# Patient Record
Sex: FEMALE | Race: WHITE | Employment: OTHER | URBAN - METROPOLITAN AREA
[De-identification: names, ages, dates, MRNs, and addresses within clinical notes are randomized per-mention and may not be internally consistent; named-entity substitution may affect disease eponyms.]

---

## 2017-01-13 ENCOUNTER — OFFICE VISIT (OUTPATIENT)
Dept: FAMILY MEDICINE CLINIC | Age: 62
End: 2017-01-13

## 2017-01-13 VITALS
DIASTOLIC BLOOD PRESSURE: 60 MMHG | BODY MASS INDEX: 18.78 KG/M2 | SYSTOLIC BLOOD PRESSURE: 118 MMHG | HEART RATE: 82 BPM | WEIGHT: 109.4 LBS | OXYGEN SATURATION: 99 % | TEMPERATURE: 97.9 F

## 2017-01-13 DIAGNOSIS — J06.9 VIRAL UPPER RESPIRATORY TRACT INFECTION: Primary | ICD-10-CM

## 2017-01-13 DIAGNOSIS — F41.9 ANXIETY: ICD-10-CM

## 2017-01-13 DIAGNOSIS — J01.01 ACUTE RECURRENT MAXILLARY SINUSITIS: ICD-10-CM

## 2017-01-13 DIAGNOSIS — M81.0 OSTEOPOROSIS: ICD-10-CM

## 2017-01-13 PROCEDURE — 99214 OFFICE O/P EST MOD 30 MIN: CPT | Performed by: FAMILY MEDICINE

## 2017-01-13 RX ORDER — ALENDRONATE SODIUM 70 MG/1
70 TABLET ORAL
Qty: 12 TABLET | Refills: 0 | Status: SHIPPED | OUTPATIENT
Start: 2017-01-13 | End: 2017-12-29 | Stop reason: SDUPTHER

## 2017-01-13 RX ORDER — AMOXICILLIN AND CLAVULANATE POTASSIUM 500; 125 MG/1; MG/1
1 TABLET, FILM COATED ORAL 3 TIMES DAILY
Qty: 21 TABLET | Refills: 0 | Status: SHIPPED | OUTPATIENT
Start: 2017-01-13 | End: 2017-01-20

## 2017-01-13 RX ORDER — TRIAMCINOLONE ACETONIDE 1 MG/G
CREAM TOPICAL
Refills: 0 | COMMUNITY
Start: 2016-12-08 | End: 2019-02-15

## 2017-01-16 RX ORDER — FLUCONAZOLE 150 MG/1
TABLET ORAL
Qty: 1 TABLET | Refills: 0 | Status: SHIPPED | OUTPATIENT
Start: 2017-01-16 | End: 2018-01-17

## 2017-01-16 RX ORDER — AZITHROMYCIN 250 MG/1
TABLET, FILM COATED ORAL
Qty: 1 PACKET | Refills: 0 | OUTPATIENT
Start: 2017-01-16 | End: 2017-01-26

## 2017-05-03 ENCOUNTER — OFFICE VISIT (OUTPATIENT)
Dept: FAMILY MEDICINE CLINIC | Age: 62
End: 2017-05-03

## 2017-05-03 VITALS
WEIGHT: 112.6 LBS | SYSTOLIC BLOOD PRESSURE: 108 MMHG | DIASTOLIC BLOOD PRESSURE: 60 MMHG | OXYGEN SATURATION: 98 % | HEART RATE: 76 BPM | BODY MASS INDEX: 19.33 KG/M2 | TEMPERATURE: 97.6 F

## 2017-05-03 DIAGNOSIS — E78.49 OTHER HYPERLIPIDEMIA: ICD-10-CM

## 2017-05-03 DIAGNOSIS — F41.9 ANXIETY: ICD-10-CM

## 2017-05-03 DIAGNOSIS — F43.20 ADULT SITUATIONAL STRESS DISORDER: ICD-10-CM

## 2017-05-03 DIAGNOSIS — M81.0 OSTEOPOROSIS: Primary | ICD-10-CM

## 2017-05-03 PROCEDURE — 99213 OFFICE O/P EST LOW 20 MIN: CPT | Performed by: FAMILY MEDICINE

## 2017-05-03 RX ORDER — ALENDRONATE SODIUM 70 MG/1
70 TABLET ORAL
Qty: 12 TABLET | Refills: 0 | Status: SHIPPED | OUTPATIENT
Start: 2017-05-03 | End: 2017-07-24 | Stop reason: ALTCHOICE

## 2017-05-03 ASSESSMENT — ENCOUNTER SYMPTOMS
BLOOD IN STOOL: 0
ABDOMINAL PAIN: 0
COUGH: 0
SHORTNESS OF BREATH: 0
CHEST TIGHTNESS: 0

## 2017-06-07 ENCOUNTER — OFFICE VISIT (OUTPATIENT)
Dept: FAMILY MEDICINE CLINIC | Age: 62
End: 2017-06-07

## 2017-06-07 VITALS
SYSTOLIC BLOOD PRESSURE: 110 MMHG | HEART RATE: 77 BPM | WEIGHT: 114.4 LBS | DIASTOLIC BLOOD PRESSURE: 64 MMHG | BODY MASS INDEX: 19.64 KG/M2 | OXYGEN SATURATION: 98 % | TEMPERATURE: 97.6 F

## 2017-06-07 DIAGNOSIS — M81.0 OSTEOPOROSIS: Primary | ICD-10-CM

## 2017-06-07 DIAGNOSIS — F43.20 ADULT SITUATIONAL STRESS DISORDER: ICD-10-CM

## 2017-06-07 PROCEDURE — 99213 OFFICE O/P EST LOW 20 MIN: CPT | Performed by: FAMILY MEDICINE

## 2017-06-07 RX ORDER — RISEDRONATE SODIUM 35 MG/1
TABLET, FILM COATED ORAL
Qty: 4 TABLET | Refills: 2 | Status: SHIPPED | OUTPATIENT
Start: 2017-06-07 | End: 2017-12-13 | Stop reason: SDUPTHER

## 2017-06-07 ASSESSMENT — ENCOUNTER SYMPTOMS
CHEST TIGHTNESS: 0
BLOOD IN STOOL: 0
ABDOMINAL PAIN: 0
COUGH: 0
SHORTNESS OF BREATH: 0

## 2017-07-27 ENCOUNTER — TELEPHONE (OUTPATIENT)
Dept: FAMILY MEDICINE CLINIC | Age: 62
End: 2017-07-27

## 2017-07-31 ENCOUNTER — TELEPHONE (OUTPATIENT)
Dept: FAMILY MEDICINE CLINIC | Age: 62
End: 2017-07-31

## 2017-08-08 ENCOUNTER — OFFICE VISIT (OUTPATIENT)
Dept: FAMILY MEDICINE CLINIC | Age: 62
End: 2017-08-08

## 2017-08-08 VITALS
HEART RATE: 101 BPM | HEIGHT: 64 IN | OXYGEN SATURATION: 99 % | WEIGHT: 114 LBS | RESPIRATION RATE: 14 BRPM | SYSTOLIC BLOOD PRESSURE: 118 MMHG | TEMPERATURE: 98.3 F | DIASTOLIC BLOOD PRESSURE: 74 MMHG | BODY MASS INDEX: 19.46 KG/M2

## 2017-08-08 DIAGNOSIS — Z00.00 ANNUAL PHYSICAL EXAM: Primary | ICD-10-CM

## 2017-08-08 DIAGNOSIS — Z11.4 ENCOUNTER FOR SCREENING FOR HIV: ICD-10-CM

## 2017-08-08 DIAGNOSIS — Z11.59 ENCOUNTER FOR HEPATITIS C SCREENING TEST FOR LOW RISK PATIENT: ICD-10-CM

## 2017-08-08 LAB
BILIRUBIN, POC: NORMAL
BLOOD URINE, POC: NORMAL
CLARITY, POC: CLEAR
COLOR, POC: YELLOW
GLUCOSE URINE, POC: NORMAL
KETONES, POC: NORMAL
LEUKOCYTE EST, POC: NORMAL
NITRITE, POC: NORMAL
PH, POC: 5.5
PROTEIN, POC: NORMAL
SPECIFIC GRAVITY, POC: 1
UROBILINOGEN, POC: NORMAL

## 2017-08-08 PROCEDURE — 93000 ELECTROCARDIOGRAM COMPLETE: CPT | Performed by: CLINICAL NURSE SPECIALIST

## 2017-08-08 PROCEDURE — 99396 PREV VISIT EST AGE 40-64: CPT | Performed by: CLINICAL NURSE SPECIALIST

## 2017-08-08 PROCEDURE — 81002 URINALYSIS NONAUTO W/O SCOPE: CPT | Performed by: CLINICAL NURSE SPECIALIST

## 2017-08-08 ASSESSMENT — ENCOUNTER SYMPTOMS
COLOR CHANGE: 0
VOMITING: 0
SHORTNESS OF BREATH: 0
COUGH: 0
ABDOMINAL PAIN: 0
CONSTIPATION: 0
NAUSEA: 0
CHEST TIGHTNESS: 0
DIARRHEA: 0
WHEEZING: 0

## 2017-08-17 DIAGNOSIS — Z00.00 ANNUAL PHYSICAL EXAM: ICD-10-CM

## 2017-08-17 DIAGNOSIS — Z11.4 ENCOUNTER FOR SCREENING FOR HIV: ICD-10-CM

## 2017-08-17 DIAGNOSIS — Z11.59 ENCOUNTER FOR HEPATITIS C SCREENING TEST FOR LOW RISK PATIENT: ICD-10-CM

## 2017-08-17 LAB
A/G RATIO: 2.1 (ref 1.1–2.2)
ALBUMIN SERPL-MCNC: 4.7 G/DL (ref 3.4–5)
ALP BLD-CCNC: 55 U/L (ref 40–129)
ALT SERPL-CCNC: 40 U/L (ref 10–40)
ANION GAP SERPL CALCULATED.3IONS-SCNC: 11 MMOL/L (ref 3–16)
AST SERPL-CCNC: 24 U/L (ref 15–37)
BASOPHILS ABSOLUTE: 0 K/UL (ref 0–0.2)
BASOPHILS RELATIVE PERCENT: 0.7 %
BILIRUB SERPL-MCNC: 1.1 MG/DL (ref 0–1)
BUN BLDV-MCNC: 18 MG/DL (ref 7–20)
CALCIUM SERPL-MCNC: 9.5 MG/DL (ref 8.3–10.6)
CHLORIDE BLD-SCNC: 101 MMOL/L (ref 99–110)
CHOLESTEROL, TOTAL: 206 MG/DL (ref 0–199)
CO2: 28 MMOL/L (ref 21–32)
CREAT SERPL-MCNC: 0.6 MG/DL (ref 0.6–1.2)
EOSINOPHILS ABSOLUTE: 0.1 K/UL (ref 0–0.6)
EOSINOPHILS RELATIVE PERCENT: 2.6 %
GFR AFRICAN AMERICAN: >60
GFR NON-AFRICAN AMERICAN: >60
GLOBULIN: 2.2 G/DL
GLUCOSE BLD-MCNC: 87 MG/DL (ref 70–99)
HCT VFR BLD CALC: 42.9 % (ref 36–48)
HDLC SERPL-MCNC: 95 MG/DL (ref 40–60)
HEMOGLOBIN: 14.3 G/DL (ref 12–16)
HEPATITIS C ANTIBODY INTERPRETATION: NORMAL
LDL CHOLESTEROL CALCULATED: 90 MG/DL
LYMPHOCYTES ABSOLUTE: 1.5 K/UL (ref 1–5.1)
LYMPHOCYTES RELATIVE PERCENT: 41.8 %
MCH RBC QN AUTO: 32.7 PG (ref 26–34)
MCHC RBC AUTO-ENTMCNC: 33.4 G/DL (ref 31–36)
MCV RBC AUTO: 98.1 FL (ref 80–100)
MONOCYTES ABSOLUTE: 0.4 K/UL (ref 0–1.3)
MONOCYTES RELATIVE PERCENT: 10.6 %
NEUTROPHILS ABSOLUTE: 1.6 K/UL (ref 1.7–7.7)
NEUTROPHILS RELATIVE PERCENT: 44.3 %
PDW BLD-RTO: 12 % (ref 12.4–15.4)
PLATELET # BLD: 192 K/UL (ref 135–450)
PMV BLD AUTO: 8.6 FL (ref 5–10.5)
POTASSIUM SERPL-SCNC: 3.9 MMOL/L (ref 3.5–5.1)
RBC # BLD: 4.37 M/UL (ref 4–5.2)
SODIUM BLD-SCNC: 140 MMOL/L (ref 136–145)
TOTAL PROTEIN: 6.9 G/DL (ref 6.4–8.2)
TRIGL SERPL-MCNC: 104 MG/DL (ref 0–150)
TSH REFLEX: 1.42 UIU/ML (ref 0.27–4.2)
VLDLC SERPL CALC-MCNC: 21 MG/DL
WBC # BLD: 3.5 K/UL (ref 4–11)

## 2017-08-18 LAB
HIV-1 AND HIV-2 ANTIBODIES: NORMAL
VITAMIN D 25-HYDROXY: 62.6 NG/ML

## 2017-08-28 DIAGNOSIS — D70.9 NEUTROPENIA, UNSPECIFIED TYPE (HCC): Primary | ICD-10-CM

## 2017-12-13 RX ORDER — RISEDRONATE SODIUM 35 MG/1
TABLET, FILM COATED ORAL
Qty: 4 TABLET | Refills: 1 | Status: SHIPPED | OUTPATIENT
Start: 2017-12-13 | End: 2018-01-17 | Stop reason: ALTCHOICE

## 2017-12-13 NOTE — TELEPHONE ENCOUNTER
Patient calling for refill. Has 2 left/2 weeks.   Medication-Actonel  Dosage 35mg  Frequency- one pill weekly  LF-6/7/17Last OV 8/8/17 Physical    [facility] [address]    Patient

## 2017-12-26 NOTE — TELEPHONE ENCOUNTER
Pt called and states needs to stitch from Rx: risedronate 35 mg one tab weekly. Needs to stitch to Rx: alendronate, pt lost her ins and needs to stitch to  mediation, pharmacy told her it will be a lot cheaper, since she will be paying out of pocket.        # [phone] ok leave dtvm

## 2017-12-27 RX ORDER — ALENDRONATE SODIUM 70 MG/1
70 TABLET ORAL
Qty: 12 TABLET | Refills: 0 | OUTPATIENT
Start: 2017-12-27

## 2017-12-29 RX ORDER — ALENDRONATE SODIUM 70 MG/1
70 TABLET ORAL
Qty: 4 TABLET | Refills: 0 | Status: SHIPPED | OUTPATIENT
Start: 2017-12-29 | End: 2018-01-17 | Stop reason: SDUPTHER

## 2018-01-17 ENCOUNTER — OFFICE VISIT (OUTPATIENT)
Dept: FAMILY MEDICINE CLINIC | Age: 63
End: 2018-01-17

## 2018-01-17 VITALS
TEMPERATURE: 97.9 F | HEART RATE: 63 BPM | BODY MASS INDEX: 20.39 KG/M2 | OXYGEN SATURATION: 99 % | WEIGHT: 118.8 LBS | DIASTOLIC BLOOD PRESSURE: 64 MMHG | SYSTOLIC BLOOD PRESSURE: 126 MMHG

## 2018-01-17 DIAGNOSIS — M81.0 OSTEOPOROSIS, UNSPECIFIED OSTEOPOROSIS TYPE, UNSPECIFIED PATHOLOGICAL FRACTURE PRESENCE: Primary | ICD-10-CM

## 2018-01-17 DIAGNOSIS — F41.9 ANXIETY: ICD-10-CM

## 2018-01-17 DIAGNOSIS — F43.20 ADULT SITUATIONAL STRESS DISORDER: ICD-10-CM

## 2018-01-17 PROCEDURE — 99213 OFFICE O/P EST LOW 20 MIN: CPT | Performed by: FAMILY MEDICINE

## 2018-01-17 RX ORDER — ALENDRONATE SODIUM 70 MG/1
70 TABLET ORAL
Qty: 4 TABLET | Refills: 5 | Status: SHIPPED | OUTPATIENT
Start: 2018-01-17 | End: 2018-07-09 | Stop reason: SDUPTHER

## 2018-01-17 ASSESSMENT — PATIENT HEALTH QUESTIONNAIRE - PHQ9
SUM OF ALL RESPONSES TO PHQ9 QUESTIONS 1 & 2: 0
SUM OF ALL RESPONSES TO PHQ QUESTIONS 1-9: 0
2. FEELING DOWN, DEPRESSED OR HOPELESS: 0
1. LITTLE INTEREST OR PLEASURE IN DOING THINGS: 0

## 2018-01-22 ASSESSMENT — ENCOUNTER SYMPTOMS
COUGH: 0
BLOOD IN STOOL: 0
SHORTNESS OF BREATH: 0
ABDOMINAL PAIN: 0

## 2018-04-05 ENCOUNTER — TELEPHONE (OUTPATIENT)
Dept: FAMILY MEDICINE CLINIC | Age: 63
End: 2018-04-05

## 2018-07-09 ENCOUNTER — OFFICE VISIT (OUTPATIENT)
Dept: FAMILY MEDICINE CLINIC | Age: 63
End: 2018-07-09

## 2018-07-09 VITALS
BODY MASS INDEX: 18.89 KG/M2 | WEIGHT: 113.4 LBS | HEIGHT: 65 IN | SYSTOLIC BLOOD PRESSURE: 112 MMHG | TEMPERATURE: 98.5 F | HEART RATE: 76 BPM | OXYGEN SATURATION: 96 % | DIASTOLIC BLOOD PRESSURE: 70 MMHG

## 2018-07-09 DIAGNOSIS — F43.20 ADULT SITUATIONAL STRESS DISORDER: ICD-10-CM

## 2018-07-09 DIAGNOSIS — M81.0 OSTEOPOROSIS, UNSPECIFIED OSTEOPOROSIS TYPE, UNSPECIFIED PATHOLOGICAL FRACTURE PRESENCE: Primary | ICD-10-CM

## 2018-07-09 DIAGNOSIS — K42.9 UMBILICAL HERNIA WITHOUT OBSTRUCTION AND WITHOUT GANGRENE: ICD-10-CM

## 2018-07-09 PROCEDURE — 99213 OFFICE O/P EST LOW 20 MIN: CPT | Performed by: FAMILY MEDICINE

## 2018-07-09 RX ORDER — ALENDRONATE SODIUM 70 MG/1
70 TABLET ORAL
Qty: 4 TABLET | Refills: 5 | Status: SHIPPED | OUTPATIENT
Start: 2018-07-09 | End: 2019-01-18 | Stop reason: SDUPTHER

## 2018-07-10 ASSESSMENT — ENCOUNTER SYMPTOMS
CHEST TIGHTNESS: 0
ABDOMINAL PAIN: 0
BLOOD IN STOOL: 0
COUGH: 0
SHORTNESS OF BREATH: 0

## 2018-07-10 NOTE — PROGRESS NOTES
SUBJECTIVE:  Cyrena Balloon   1955   female   Allergies   Allergen Reactions    Codeine Other (See Comments)     GI       Chief Complaint   Patient presents with    Osteoporosis     6 month med check        Patient Active Problem List    Diagnosis Date Noted    Adult situational stress disorder 05/13/2016    Osteoporosis 06/17/2015    Mucositis 04/30/2012     Overview:   ICD-10 Transition         HPI   Pt is here today for fu on osteoporosis and co something on her belly button. She has been doing well on Fosamax. Last DEXA last yr. She was sent for Reclast or change in med but was unable to due to insurance changes. She has been off zoloft for a while and reports she has been doing well without it. Denies sx of depression or anxiety. Sleeping well. Has noticed a swelling around belly button for a couple of yrs and wants to have it taken care of. No abdominal pain,N,V.  Past Medical History:   Diagnosis Date    Allergic rhinitis, cause unspecified     Osteoporosis, unspecified     Pure hypercholesterolemia     Unspecified acute reaction to stress      Social History     Social History    Marital status:      Spouse name: N/A    Number of children: N/A    Years of education: N/A     Occupational History    Not on file. Social History Main Topics    Smoking status: Former Smoker     Types: Cigarettes     Quit date: 1/7/1982    Smokeless tobacco: Never Used    Alcohol use 0.0 oz/week    Drug use: No    Sexual activity: Not on file     Other Topics Concern    Not on file     Social History Narrative    No narrative on file     Family History   Problem Relation Age of Onset    Heart Disease Mother     COPD Father        Review of Systems   Constitutional: Negative for activity change, appetite change and unexpected weight change. Respiratory: Negative for cough, chest tightness and shortness of breath. Cardiovascular: Negative for chest pain, palpitations and leg swelling.

## 2018-10-05 ENCOUNTER — OFFICE VISIT (OUTPATIENT)
Dept: FAMILY MEDICINE CLINIC | Age: 63
End: 2018-10-05

## 2018-10-05 VITALS
WEIGHT: 112.2 LBS | HEART RATE: 98 BPM | OXYGEN SATURATION: 95 % | TEMPERATURE: 98.3 F | SYSTOLIC BLOOD PRESSURE: 116 MMHG | BODY MASS INDEX: 18.96 KG/M2 | DIASTOLIC BLOOD PRESSURE: 72 MMHG

## 2018-10-05 DIAGNOSIS — M81.0 OSTEOPOROSIS, UNSPECIFIED OSTEOPOROSIS TYPE, UNSPECIFIED PATHOLOGICAL FRACTURE PRESENCE: ICD-10-CM

## 2018-10-05 DIAGNOSIS — Z01.818 PRE-OP EXAM: Primary | ICD-10-CM

## 2018-10-05 DIAGNOSIS — D72.9 ABNORMAL WBC COUNT: ICD-10-CM

## 2018-10-05 DIAGNOSIS — R94.111: ICD-10-CM

## 2018-10-05 PROCEDURE — 93000 ELECTROCARDIOGRAM COMPLETE: CPT | Performed by: FAMILY MEDICINE

## 2018-10-05 PROCEDURE — 99243 OFF/OP CNSLTJ NEW/EST LOW 30: CPT | Performed by: FAMILY MEDICINE

## 2019-01-18 ENCOUNTER — OFFICE VISIT (OUTPATIENT)
Dept: FAMILY MEDICINE CLINIC | Age: 64
End: 2019-01-18

## 2019-01-18 VITALS
HEART RATE: 88 BPM | TEMPERATURE: 98.2 F | DIASTOLIC BLOOD PRESSURE: 68 MMHG | SYSTOLIC BLOOD PRESSURE: 110 MMHG | OXYGEN SATURATION: 94 % | WEIGHT: 114.6 LBS | BODY MASS INDEX: 19.37 KG/M2

## 2019-01-18 DIAGNOSIS — M81.0 OSTEOPOROSIS, UNSPECIFIED OSTEOPOROSIS TYPE, UNSPECIFIED PATHOLOGICAL FRACTURE PRESENCE: Primary | ICD-10-CM

## 2019-01-18 DIAGNOSIS — F43.20 ADULT SITUATIONAL STRESS DISORDER: ICD-10-CM

## 2019-01-18 PROCEDURE — 99213 OFFICE O/P EST LOW 20 MIN: CPT | Performed by: FAMILY MEDICINE

## 2019-01-18 RX ORDER — ALENDRONATE SODIUM 70 MG/1
70 TABLET ORAL
Qty: 4 TABLET | Refills: 5 | Status: SHIPPED | OUTPATIENT
Start: 2019-01-18 | End: 2021-08-18 | Stop reason: ALTCHOICE

## 2019-01-18 ASSESSMENT — PATIENT HEALTH QUESTIONNAIRE - PHQ9
SUM OF ALL RESPONSES TO PHQ9 QUESTIONS 1 & 2: 0
2. FEELING DOWN, DEPRESSED OR HOPELESS: 0
SUM OF ALL RESPONSES TO PHQ QUESTIONS 1-9: 0
SUM OF ALL RESPONSES TO PHQ QUESTIONS 1-9: 0
1. LITTLE INTEREST OR PLEASURE IN DOING THINGS: 0

## 2019-01-19 ASSESSMENT — ENCOUNTER SYMPTOMS
CONSTIPATION: 0
COUGH: 0
DIARRHEA: 0
SHORTNESS OF BREATH: 0
BLOOD IN STOOL: 0
ABDOMINAL PAIN: 0
CHEST TIGHTNESS: 0

## 2019-02-15 ENCOUNTER — TELEPHONE (OUTPATIENT)
Dept: FAMILY MEDICINE CLINIC | Age: 64
End: 2019-02-15

## 2019-02-15 ENCOUNTER — OFFICE VISIT (OUTPATIENT)
Dept: FAMILY MEDICINE CLINIC | Age: 64
End: 2019-02-15

## 2019-02-15 VITALS
BODY MASS INDEX: 19.06 KG/M2 | WEIGHT: 114.4 LBS | OXYGEN SATURATION: 98 % | SYSTOLIC BLOOD PRESSURE: 128 MMHG | HEIGHT: 65 IN | DIASTOLIC BLOOD PRESSURE: 72 MMHG | TEMPERATURE: 97.8 F | HEART RATE: 100 BPM

## 2019-02-15 DIAGNOSIS — K86.2 PANCREATIC CYST: Primary | ICD-10-CM

## 2019-02-15 DIAGNOSIS — R10.9 ABDOMINAL PAIN, UNSPECIFIED ABDOMINAL LOCATION: ICD-10-CM

## 2019-02-15 DIAGNOSIS — R79.89 ELEVATED LFTS: ICD-10-CM

## 2019-02-15 PROCEDURE — 99214 OFFICE O/P EST MOD 30 MIN: CPT | Performed by: FAMILY MEDICINE

## 2019-02-18 ENCOUNTER — TELEPHONE (OUTPATIENT)
Dept: FAMILY MEDICINE CLINIC | Age: 64
End: 2019-02-18

## 2019-02-18 DIAGNOSIS — K86.9 LESION OF PANCREAS: Primary | ICD-10-CM

## 2019-02-21 DIAGNOSIS — D72.9 ABNORMAL WBC COUNT: ICD-10-CM

## 2019-02-21 DIAGNOSIS — R79.89 ELEVATED LFTS: ICD-10-CM

## 2019-02-21 LAB
A/G RATIO: 1.8 (ref 1.1–2.2)
ALBUMIN SERPL-MCNC: 4.4 G/DL (ref 3.4–5)
ALP BLD-CCNC: 57 U/L (ref 40–129)
ALT SERPL-CCNC: 41 U/L (ref 10–40)
ANION GAP SERPL CALCULATED.3IONS-SCNC: 13 MMOL/L (ref 3–16)
AST SERPL-CCNC: 21 U/L (ref 15–37)
BASOPHILS ABSOLUTE: 0 K/UL (ref 0–0.2)
BASOPHILS RELATIVE PERCENT: 0.7 %
BILIRUB SERPL-MCNC: 0.9 MG/DL (ref 0–1)
BUN BLDV-MCNC: 18 MG/DL (ref 7–20)
CALCIUM SERPL-MCNC: 9.6 MG/DL (ref 8.3–10.6)
CHLORIDE BLD-SCNC: 103 MMOL/L (ref 99–110)
CO2: 25 MMOL/L (ref 21–32)
CREAT SERPL-MCNC: 0.6 MG/DL (ref 0.6–1.2)
EOSINOPHILS ABSOLUTE: 0.1 K/UL (ref 0–0.6)
EOSINOPHILS RELATIVE PERCENT: 2 %
GFR AFRICAN AMERICAN: >60
GFR NON-AFRICAN AMERICAN: >60
GLOBULIN: 2.4 G/DL
GLUCOSE BLD-MCNC: 55 MG/DL (ref 70–99)
HCT VFR BLD CALC: 41.3 % (ref 36–48)
HEMOGLOBIN: 13.9 G/DL (ref 12–16)
LYMPHOCYTES ABSOLUTE: 1.5 K/UL (ref 1–5.1)
LYMPHOCYTES RELATIVE PERCENT: 32.5 %
MCH RBC QN AUTO: 33 PG (ref 26–34)
MCHC RBC AUTO-ENTMCNC: 33.7 G/DL (ref 31–36)
MCV RBC AUTO: 97.9 FL (ref 80–100)
MONOCYTES ABSOLUTE: 0.5 K/UL (ref 0–1.3)
MONOCYTES RELATIVE PERCENT: 10 %
NEUTROPHILS ABSOLUTE: 2.5 K/UL (ref 1.7–7.7)
NEUTROPHILS RELATIVE PERCENT: 54.8 %
PDW BLD-RTO: 12.2 % (ref 12.4–15.4)
PLATELET # BLD: 213 K/UL (ref 135–450)
PMV BLD AUTO: 8.6 FL (ref 5–10.5)
POTASSIUM SERPL-SCNC: 3.7 MMOL/L (ref 3.5–5.1)
RBC # BLD: 4.22 M/UL (ref 4–5.2)
SODIUM BLD-SCNC: 141 MMOL/L (ref 136–145)
TOTAL PROTEIN: 6.8 G/DL (ref 6.4–8.2)
WBC # BLD: 4.6 K/UL (ref 4–11)

## 2019-02-22 ENCOUNTER — TELEPHONE (OUTPATIENT)
Dept: FAMILY MEDICINE CLINIC | Age: 64
End: 2019-02-22

## 2019-02-25 ENCOUNTER — TELEPHONE (OUTPATIENT)
Dept: FAMILY MEDICINE CLINIC | Age: 64
End: 2019-02-25

## 2019-02-25 RX ORDER — DICYCLOMINE HYDROCHLORIDE 10 MG/1
CAPSULE ORAL
Qty: 1 CAPSULE | Refills: 0 | Status: SHIPPED | OUTPATIENT
Start: 2019-02-25 | End: 2021-08-18 | Stop reason: ALTCHOICE

## 2019-02-27 ENCOUNTER — TELEPHONE (OUTPATIENT)
Dept: FAMILY MEDICINE CLINIC | Age: 64
End: 2019-02-27

## 2019-03-04 DIAGNOSIS — K86.9 LESION OF PANCREAS: ICD-10-CM

## 2019-03-18 ENCOUNTER — TELEPHONE (OUTPATIENT)
Dept: FAMILY MEDICINE CLINIC | Age: 64
End: 2019-03-18

## 2019-03-19 ENCOUNTER — TELEPHONE (OUTPATIENT)
Dept: FAMILY MEDICINE CLINIC | Age: 64
End: 2019-03-19

## 2019-05-03 ENCOUNTER — OFFICE VISIT (OUTPATIENT)
Dept: FAMILY MEDICINE CLINIC | Age: 64
End: 2019-05-03

## 2019-05-03 VITALS
HEIGHT: 65 IN | RESPIRATION RATE: 16 BRPM | OXYGEN SATURATION: 97 % | BODY MASS INDEX: 18.86 KG/M2 | TEMPERATURE: 97.8 F | WEIGHT: 113.2 LBS | SYSTOLIC BLOOD PRESSURE: 120 MMHG | DIASTOLIC BLOOD PRESSURE: 62 MMHG | HEART RATE: 88 BPM

## 2019-05-03 DIAGNOSIS — Z01.818 PRE-OP TESTING: Primary | ICD-10-CM

## 2019-05-03 DIAGNOSIS — H26.9 CATARACT OF LEFT EYE, UNSPECIFIED CATARACT TYPE: ICD-10-CM

## 2019-05-03 DIAGNOSIS — M81.0 OSTEOPOROSIS, UNSPECIFIED OSTEOPOROSIS TYPE, UNSPECIFIED PATHOLOGICAL FRACTURE PRESENCE: ICD-10-CM

## 2019-05-03 PROCEDURE — 99242 OFF/OP CONSLTJ NEW/EST SF 20: CPT | Performed by: FAMILY MEDICINE

## 2019-05-03 PROCEDURE — 93000 ELECTROCARDIOGRAM COMPLETE: CPT | Performed by: FAMILY MEDICINE

## 2019-05-03 NOTE — PROGRESS NOTES
Preoperative Consultation      Alexander Cleveland  YOB: 1955    Date of Service:  5/3/2019    Vitals:    05/03/19 1332   BP: 120/62   Site: Left Upper Arm   Position: Sitting   Cuff Size: Medium Adult   Pulse: 88   Resp: 16   Temp: 97.8 °F (36.6 °C)   SpO2: 97%   Weight: 113 lb 3.2 oz (51.3 kg)   Height: 5' 4.5\" (1.638 m)      Wt Readings from Last 2 Encounters:   05/03/19 113 lb 3.2 oz (51.3 kg)   02/15/19 114 lb 6.4 oz (51.9 kg)     BP Readings from Last 3 Encounters:   05/03/19 120/62   02/15/19 128/72   01/18/19 110/68        Chief Complaint   Patient presents with   [facility] Pre-op Exam     cataract extraction left eye on 5/16 at [facility]   Allergen Reactions    Codeine Other (See Comments) and Nausea And Vomiting     GI     No outpatient medications have been marked as taking for the 5/3/19 encounter (Office Visit) with Denny Salvador MD.       This patient presents to the office today for a preoperative consultation at the request of surgeon, Dr. Usama Britt, who plans on performing left eye cataract surgery on May 16 at [facility] surgery center. The current problem began 2 months ago, and symptoms have been worsening with time. Conservative therapy: N/A. Planned anesthesia: Local and IV sedation   Known anesthesia problems: None   Bleeding risk: No recent or remote history of abnormal bleeding  Personal or FH of DVT/PE: No    Patient objection to receiving blood products: No    Patient Active Problem List   Diagnosis    Osteoporosis    Mucositis    Adult situational stress disorder       Past Medical History:   Diagnosis Date    Allergic rhinitis, cause unspecified     Osteoporosis, unspecified     Pure hypercholesterolemia     Unspecified acute reaction to stress      No past surgical history on file.   Family History   Problem Relation Age of Onset    Heart Disease Mother     COPD Father      Social History     Socioeconomic History    Marital status:      Spouse name: Not on file    Number of children: Not on file    Years of education: Not on file    Highest education level: Not on file   Occupational History    Not on file   Social Needs    Financial resource strain: Not on file    Food insecurity:     Worry: Not on file     Inability: Not on file    Transportation needs:     Medical: Not on file     Non-medical: Not on file   Tobacco Use    Smoking status: Former Smoker     Packs/day: 1.00     Years: 8.00     Pack years: 8.00     Types: Cigarettes     Last attempt to quit: 1982     Years since quittin.3    Smokeless tobacco: Never Used   Substance and Sexual Activity    Alcohol use: Yes     Alcohol/week: 0.0 oz    Drug use: No    Sexual activity: Not on file   Lifestyle    Physical activity:     Days per week: Not on file     Minutes per session: Not on file    Stress: Not on file   Relationships    Social connections:     Talks on phone: Not on file     Gets together: Not on file     Attends Denominational service: Not on file     Active member of club or organization: Not on file     Attends meetings of clubs or organizations: Not on file     Relationship status: Not on file    Intimate partner violence:     Fear of current or ex partner: Not on file     Emotionally abused: Not on file     Physically abused: Not on file     Forced sexual activity: Not on file   Other Topics Concern    Not on file   Social History Narrative    Not on file       Review of Systems  A comprehensive review of systems was negative except for what was noted in the HPI. Physical Exam   Constitutional: She is oriented to person, place, and time. She appears well-developed and well-nourished. No distress. HENT:   Head: Normocephalic and atraumatic. Mouth/Throat: Uvula is midline, oropharynx is clear and moist and mucous membranes are normal.   Eyes: Conjunctivae and EOM are normal. Pupils are equal, round, and reactive to light.    Neck: Trachea normal and normal range of of dose titration is associated with an overall increase in mortality. Beta-blockers should be started days to weeks prior to surgery and titrated to pulse < 70.  4. Deep vein thrombosis prophylaxis: regimen to be chosen by surgical team  5.  No contraindications to planned surgery

## 2019-05-12 ASSESSMENT — ENCOUNTER SYMPTOMS
VOMITING: 0
ABDOMINAL PAIN: 0
COUGH: 0
SHORTNESS OF BREATH: 0
CONSTIPATION: 0
BLOOD IN STOOL: 0
DIARRHEA: 0

## 2019-05-13 NOTE — PROGRESS NOTES
SUBJECTIVE:  Leona Mathur   1955   female   Allergies   Allergen Reactions    Codeine Other (See Comments) and Nausea And Vomiting     GI       Chief Complaint   Patient presents with    Abdominal Pain     Started 6days ago.  Results     f/u on ct scan result done in [place] 2 weeks ago. Patient Active Problem List    Diagnosis Date Noted    Adult situational stress disorder 2016    Osteoporosis 2015    Mucositis 2012     Overview:   ICD-10 Transition         HPI   Pt is here today for fu from recent abdominal pain, possible pancreatic mass/cyst and elevated LFTs. She was recently evaluated while on vacation in [place] for acute low abdominal pain. Reports she noticed the pain after eating dinner. No N/V,fever,chills. No bowel complaints. Pt went to ED in [place] and labs revealed elevated LFTs , otherwise nl. CT of abdomen showed a 1.1 cm pancreatic mass . A fu MRI was advise. Pt reports sx have completely resolved a day after ER visit. Has been doing well since. Denies alcohol use except occasional social drinking.   Past Medical History:   Diagnosis Date    Allergic rhinitis, cause unspecified     Osteoporosis, unspecified     Pure hypercholesterolemia     Unspecified acute reaction to stress      Social History     Socioeconomic History    Marital status:      Spouse name: Not on file    Number of children: Not on file    Years of education: Not on file    Highest education level: Not on file   Occupational History    Not on file   Social Needs    Financial resource strain: Not on file    Food insecurity:     Worry: Not on file     Inability: Not on file    Transportation needs:     Medical: Not on file     Non-medical: Not on file   Tobacco Use    Smoking status: Former Smoker     Packs/day: 1.00     Years: 8.00     Pack years: 8.00     Types: Cigarettes     Last attempt to quit: 1982     Years since quittin.3    Smokeless tobacco: Never Used   Substance and Sexual Activity    Alcohol use: Yes     Alcohol/week: 0.0 oz    Drug use: No    Sexual activity: Not on file   Lifestyle    Physical activity:     Days per week: Not on file     Minutes per session: Not on file    Stress: Not on file   Relationships    Social connections:     Talks on phone: Not on file     Gets together: Not on file     Attends Lutheran service: Not on file     Active member of club or organization: Not on file     Attends meetings of clubs or organizations: Not on file     Relationship status: Not on file    Intimate partner violence:     Fear of current or ex partner: Not on file     Emotionally abused: Not on file     Physically abused: Not on file     Forced sexual activity: Not on file   Other Topics Concern    Not on file   Social History Narrative    Not on file     Family History   Problem Relation Age of Onset    Heart Disease Mother     COPD Father         Review of Systems   Constitutional: Negative for activity change, appetite change and unexpected weight change. Respiratory: Negative for cough and shortness of breath. Cardiovascular: Negative for chest pain and leg swelling. Gastrointestinal: Negative for abdominal pain, blood in stool, constipation, diarrhea and vomiting. Musculoskeletal: Negative for arthralgias and myalgias. Skin: Negative for rash. Neurological: Negative for light-headedness and headaches. Hematological: Negative for adenopathy. Does not bruise/bleed easily. Psychiatric/Behavioral: Negative for suicidal ideas. OBJECTIVE:  /72 (Site: Right Upper Arm, Position: Sitting, Cuff Size: Medium Adult)   Pulse 100   Temp 97.8 °F (36.6 °C) (Oral)   Ht 5' 4.5\" (1.638 m)   Wt 114 lb 6.4 oz (51.9 kg)   SpO2 98%   BMI 19.33 kg/m²   Physical Exam   Constitutional: She is oriented to person, place, and time. She appears well-developed and well-nourished. Eyes: Pupils are equal, round, and reactive to light.  EOM are normal.   Neck: Normal range of motion. Neck supple. No JVD present. No thyromegaly present. Cardiovascular: Normal rate and regular rhythm. Pulmonary/Chest: Effort normal and breath sounds normal.   Abdominal: Soft. Bowel sounds are normal. She exhibits no distension. There is no tenderness. There is no guarding. Neurological: She is alert and oriented to person, place, and time. Skin: No rash noted. Psychiatric: She has a normal mood and affect. Her behavior is normal. Thought content normal.   Nursing note and vitals reviewed. ASSESSMENT/PLAN:    1. Pancreatic cyst  Reviewed recent CT  Will fu with pancreatic MRI    2. Elevated LFTs  Reviewed labs  Re check LFTs  - Comprehensive Metabolic Panel; Future    3. Abdominal pain, unspecified abdominal location  Reviewed ER visit and testing  Sx have resolved completely      Orders Placed This Encounter   Procedures    Comprehensive Metabolic Panel     Standing Status:   Future     Number of Occurrences:   1     Standing Expiration Date:   3/6/2020     Current Outpatient Medications   Medication Sig Dispense Refill    alendronate (FOSAMAX) 70 MG tablet Take 1 tablet by mouth every 7 days 4 tablet 5    dicyclomine (BENTYL) 10 MG capsule 1 po qd for 1 d prior to MRI 1 capsule 0     No current facility-administered medications for this visit. Return if symptoms worsen or fail to improve.     Sylvia Oh MD

## 2019-06-26 ENCOUNTER — OFFICE VISIT (OUTPATIENT)
Dept: FAMILY MEDICINE CLINIC | Age: 64
End: 2019-06-26

## 2019-06-26 ENCOUNTER — TELEPHONE (OUTPATIENT)
Dept: FAMILY MEDICINE CLINIC | Age: 64
End: 2019-06-26

## 2019-06-26 VITALS
SYSTOLIC BLOOD PRESSURE: 122 MMHG | HEART RATE: 95 BPM | DIASTOLIC BLOOD PRESSURE: 82 MMHG | BODY MASS INDEX: 18.93 KG/M2 | WEIGHT: 112 LBS | TEMPERATURE: 98.3 F | OXYGEN SATURATION: 95 %

## 2019-06-26 DIAGNOSIS — H91.90 DECREASED HEARING, UNSPECIFIED LATERALITY: ICD-10-CM

## 2019-06-26 DIAGNOSIS — M81.0 OSTEOPOROSIS, UNSPECIFIED OSTEOPOROSIS TYPE, UNSPECIFIED PATHOLOGICAL FRACTURE PRESENCE: Primary | ICD-10-CM

## 2019-06-26 PROCEDURE — 99213 OFFICE O/P EST LOW 20 MIN: CPT | Performed by: FAMILY MEDICINE

## 2019-06-26 NOTE — TELEPHONE ENCOUNTER
Emergency Department Nursing Plan of Care The Nursing Plan of Care is developed from the Nursing assessment and Emergency Department Attending provider initial evaluation. The plan of care may be reviewed in the ED Provider note. The Plan of Care was developed with the following considerations:  
Patient / Family readiness to learn indicated by:verbalized understanding Persons(s) to be included in education: patient Barriers to Learning/Limitations:No 
 
Signed Bryan Ferrer 5/7/2019   8:36 AM 
 
 Pt has appt

## 2019-06-26 NOTE — TELEPHONE ENCOUNTER
Patient calling scheduled for appointment tomorrow for left ear pain but feels she needs to be seen today.   Please call pt back at [phone]

## 2019-06-26 NOTE — TELEPHONE ENCOUNTER
Patient states went got ears tested x3 weeks ago they told her she a hole in ear patient does not remember facility, patient states left ear is hurting

## 2019-07-15 ASSESSMENT — ENCOUNTER SYMPTOMS
ABDOMINAL PAIN: 0
SHORTNESS OF BREATH: 0
COUGH: 0

## 2020-11-09 ENCOUNTER — OFFICE VISIT (OUTPATIENT)
Dept: ORTHOPEDIC SURGERY | Age: 65
End: 2020-11-09
Payer: MEDICARE

## 2020-11-09 VITALS — WEIGHT: 112 LBS | HEIGHT: 64 IN | BODY MASS INDEX: 19.12 KG/M2

## 2020-11-09 PROCEDURE — G8399 PT W/DXA RESULTS DOCUMENT: HCPCS | Performed by: ORTHOPAEDIC SURGERY

## 2020-11-09 PROCEDURE — 1090F PRES/ABSN URINE INCON ASSESS: CPT | Performed by: ORTHOPAEDIC SURGERY

## 2020-11-09 PROCEDURE — 4040F PNEUMOC VAC/ADMIN/RCVD: CPT | Performed by: ORTHOPAEDIC SURGERY

## 2020-11-09 PROCEDURE — 99203 OFFICE O/P NEW LOW 30 MIN: CPT | Performed by: ORTHOPAEDIC SURGERY

## 2020-11-09 PROCEDURE — G8427 DOCREV CUR MEDS BY ELIG CLIN: HCPCS | Performed by: ORTHOPAEDIC SURGERY

## 2020-11-09 PROCEDURE — 3017F COLORECTAL CA SCREEN DOC REV: CPT | Performed by: ORTHOPAEDIC SURGERY

## 2020-11-09 PROCEDURE — G8484 FLU IMMUNIZE NO ADMIN: HCPCS | Performed by: ORTHOPAEDIC SURGERY

## 2020-11-09 PROCEDURE — G8420 CALC BMI NORM PARAMETERS: HCPCS | Performed by: ORTHOPAEDIC SURGERY

## 2020-11-09 PROCEDURE — 1123F ACP DISCUSS/DSCN MKR DOCD: CPT | Performed by: ORTHOPAEDIC SURGERY

## 2020-11-09 PROCEDURE — 1036F TOBACCO NON-USER: CPT | Performed by: ORTHOPAEDIC SURGERY

## 2020-11-09 NOTE — PROGRESS NOTES
New Patient: LUMBAR SPINE    Referring Provider:  None    CHIEF COMPLAINT:    Chief Complaint   Patient presents with    Back Pain     Patient states that her pain began about 1.5 years ago. She complains of right sided low back pain and radiating pain into her right leg. She has had 2 SHANTA's, radio frequency ablasion and PT with no relief. She states that the PT helps strengthen her muscles. She does her exercises everyday at home. Took Gabapentin but no relief so stopped it. HISTORY OF PRESENT ILLNESS:       Ms. Gulshan Howard is a pleasant 72 y.o. female here for consultation regarding her LBP and right leg pain. She states her pain began insidiously about 1.5 years ago. Her pain has steadily increased since then. She rates her back pain 10/10 and leg pain 6/10. She describes the pain as aching and sharp. Pain is worse with prolonged sitting and standing, and improved some with lying down. Her chief complaint is pain in her right sacroiliac area. The leg pain radiates down the lateral aspect of her leg to her right ankle intermittently. She notes intermittent numbness and tingling in her right leg and foot. She notes occasional weakness of her legs. She denies saddle numbness or bowel or bladder dysfunction. The pain occasionally disrupts her sleep. Current/Past Treatment:   · Physical Therapy: 8 weeks without  relief  · Chiropractic:  None   · Injection:  SHANTA x 2, Facet injection, and RFA   · Medications:  Tylenol, Previously taking Gabapentin without relief     Past Medical History:   Past Medical History:   Diagnosis Date    Allergic rhinitis, cause unspecified     Osteoporosis, unspecified     Pure hypercholesterolemia     Unspecified acute reaction to stress         Past Surgical History:     No past surgical history on file.     Current Medications:     Current Outpatient Medications:     dicyclomine (BENTYL) 10 MG capsule, 1 po qd for 1 d prior to MRI (Patient not taking: Reported Sagittal and Coronal balance is neutral.      · Palpation:   No evidence of tenderness at the midline. No tenderness bilaterally at the paraspinal or trochanters. There is no step-off or paraspinal spasm. · Range of Motion: Lumbar flexion, extension and rotation are mildly limited due to pain. · Strength:   Strength testing is 5/5 in all muscle groups tested. · Special Tests:   Straight leg raise and crossed SLR negative. Leg length and pelvis level. · Skin: There are no rashes, ulcerations or lesions. · Reflexes: Reflexes are symmetrically 2+ at the patellar and ankle tendons. Clonus absent bilaterally at the feet. · Gait & station: normal, patient ambulates without assistance    · Additional Examinations:   · RIGHT LOWER EXTREMITY: Inspection/examination of the right lower extremity does not show any tenderness, deformity or injury. Range of motion is unremarkable. There is no gross instability. There are no rashes, ulcerations or lesions. Strength and tone are normal.  · LEFT LOWER EXTREMITY:  Inspection/examination of the left lower extremity does not show any tenderness, deformity or injury. Range of motion is unremarkable. There is no gross instability. There are no rashes, ulcerations or lesions. Strength and tone are normal.    Diagnostic Testing:    CT lumbar spine from 7/20/20 notes mild multilevel degenerative disc disease without stenosis. MRI lumbar spine 1/2/20 note mild multilevel spondylosis without severe stenosis or nerve impingement. 4mm Tarlov cyst noted. EMG from 1/7/20 was reviewed and notes borderline evidence of a mild right deep peroneal mononeuropathy near the fibular head of the right knee. No evidence of lumbar radiculopathy noted.      Impression:   Right sacroiliac dysfunction  Lumbar spondylosis with radiculopathy    Plan:      · We discussed treatment options including observation, additional physical therapy, epidural injections, sacroiliac injections and additional imaging. I recommend she try chiropractic care. She was also provided information for Mayda exercises to try at home.

## 2020-11-10 ENCOUNTER — TELEPHONE (OUTPATIENT)
Dept: ORTHOPEDIC SURGERY | Age: 65
End: 2020-11-10

## 2020-11-19 ENCOUNTER — OFFICE VISIT (OUTPATIENT)
Dept: ORTHOPEDIC SURGERY | Age: 65
End: 2020-11-19
Payer: MEDICARE

## 2020-11-19 ENCOUNTER — PRE-EVALUATION (OUTPATIENT)
Dept: ORTHOPEDIC SURGERY | Age: 65
End: 2020-11-19

## 2020-11-19 VITALS — BODY MASS INDEX: 19.12 KG/M2 | HEIGHT: 64 IN | WEIGHT: 112 LBS

## 2020-11-19 PROCEDURE — G8427 DOCREV CUR MEDS BY ELIG CLIN: HCPCS | Performed by: ORTHOPAEDIC SURGERY

## 2020-11-19 PROCEDURE — 1036F TOBACCO NON-USER: CPT | Performed by: ORTHOPAEDIC SURGERY

## 2020-11-19 PROCEDURE — G8484 FLU IMMUNIZE NO ADMIN: HCPCS | Performed by: ORTHOPAEDIC SURGERY

## 2020-11-19 PROCEDURE — APPSS15 APP SPLIT SHARED TIME 0-15 MINUTES: Performed by: PHYSICIAN ASSISTANT

## 2020-11-19 PROCEDURE — G8399 PT W/DXA RESULTS DOCUMENT: HCPCS | Performed by: ORTHOPAEDIC SURGERY

## 2020-11-19 PROCEDURE — 99213 OFFICE O/P EST LOW 20 MIN: CPT | Performed by: ORTHOPAEDIC SURGERY

## 2020-11-19 PROCEDURE — G8420 CALC BMI NORM PARAMETERS: HCPCS | Performed by: ORTHOPAEDIC SURGERY

## 2020-11-19 PROCEDURE — 4040F PNEUMOC VAC/ADMIN/RCVD: CPT | Performed by: ORTHOPAEDIC SURGERY

## 2020-11-19 PROCEDURE — 1123F ACP DISCUSS/DSCN MKR DOCD: CPT | Performed by: ORTHOPAEDIC SURGERY

## 2020-11-19 PROCEDURE — 3017F COLORECTAL CA SCREEN DOC REV: CPT | Performed by: ORTHOPAEDIC SURGERY

## 2020-11-19 PROCEDURE — 1090F PRES/ABSN URINE INCON ASSESS: CPT | Performed by: ORTHOPAEDIC SURGERY

## 2020-11-19 NOTE — PROGRESS NOTES
New Patient: LUMBAR SPINE    Referring Provider:  None    CHIEF COMPLAINT:    Chief Complaint   Patient presents with    Check-Up     Lumbar disc herniation        HISTORY OF PRESENT ILLNESS:        Ms. Mai Raines is a pleasant 72 y.o. female who returns today regarding her LBP and right leg pain. She states her pain began insidiously about 1.5 years ago. Her pain has steadily increased since then. She reports no changes since her last office visit. She rates her back pain 10/10 and leg pain 6/10. She describes the pain as aching and sharp. Pain is worse with prolonged sitting and standing, and improved some with lying down. Her chief complaint is pain in her right sacroiliac area. The leg pain radiates down the lateral aspect of her leg to her right ankle intermittently. She notes intermittent numbness and tingling in her right leg and foot. She notes occasional weakness of her legs. She denies saddle numbness or bowel or bladder dysfunction. The pain occasionally disrupts her sleep. Current/Past Treatment:   · Physical Therapy: 8 weeks without  relief  · Chiropractic:  None   · Injection:  SHANTA x 2, Facet injection, and RFA   · Medications:  Tylenol, Previously taking Gabapentin without relief     Past Medical History:   Past Medical History:   Diagnosis Date    Allergic rhinitis, cause unspecified     Osteoporosis, unspecified     Pure hypercholesterolemia     Unspecified acute reaction to stress         Past Surgical History:     No past surgical history on file. Current Medications:     Current Outpatient Medications:     dicyclomine (BENTYL) 10 MG capsule, 1 po qd for 1 d prior to MRI (Patient not taking: Reported on 11/9/2020), Disp: 1 capsule, Rfl: 0    alendronate (FOSAMAX) 70 MG tablet, Take 1 tablet by mouth every 7 days (Patient not taking: Reported on 11/9/2020), Disp: 4 tablet, Rfl: 5    Allergies:  Codeine    Social History:    reports that she quit smoking about 38 years ago. Her smoking use included cigarettes. She has a 8.00 pack-year smoking history. She has never used smokeless tobacco. She reports current alcohol use. She reports that she does not use drugs. Family History:   Family History   Problem Relation Age of Onset    Heart Disease Mother     COPD Father        REVIEW OF SYSTEMS: Full ROS noted & scanned   CONSTITUTIONAL: Denies unexplained weight loss, fevers, chills or fatigue  NEUROLOGICAL: Denies unsteady gait or progressive weakness  MUSCULOSKELETAL: Denies joint swelling or redness  PSYCHOLOGICAL: Denies anxiety, depression   SKIN: Denies skin changes, delayed healing, rash, itching   HEMATOLOGIC: Denies easy bleeding or bruising  ENDOCRINE: Denies excessive thirst, urination, heat/cold  RESPIRATORY: Denies current dyspnea, cough  GI: Denies nausea, vomiting, diarrhea   : Denies bowel or bladder issues      PHYSICAL EXAM:    Vitals: Height 5' 4\" (1.626 m), weight 112 lb (50.8 kg), not currently breastfeeding. GENERAL EXAM:  · General Apparence: Patient is adequately groomed with no evidence of malnutrition. · Orientation: The patient is oriented to time, place and person. · Mood & Affect:The patient's mood and affect are appropriate. · Vascular: Examination reveals no swelling tenderness in upper or lower extremities. Good capillary refill. · Lymphatic: The lymphatic examination bilaterally reveals all areas to be without enlargement or induration  · Sensation: Sensation is intact without deficit  · Coordination/Balance: Good coordination. Tandem walking normal.     LUMBAR/SACRAL EXAMINATION:  · Inspection: Local inspection shows no step-off or bruising. Lumbar alignment is normal.  Sagittal and Coronal balance is neutral.      · Palpation:   No evidence of tenderness at the midline. No tenderness bilaterally at the paraspinal or trochanters. There is no step-off or paraspinal spasm.    · Range of Motion: Lumbar flexion, extension and rotation are

## 2021-08-13 ENCOUNTER — NURSE TRIAGE (OUTPATIENT)
Dept: OTHER | Facility: CLINIC | Age: 66
End: 2021-08-13

## 2021-08-13 NOTE — TELEPHONE ENCOUNTER
Received call from  at [facility]NNY with Red Flag Complaint. Brief description of triage: Pt with sore throat and headache for 3 days and getting worse. Sore throat is 7-8/10, still able to swallow. Also with a headache, congestion and runny nose. No fever. Triage indicates for patient to see today in office. Pt is a new pt with appt on 8/18, pt will go to [facility] or walk in clinic to be seen today. Care advice provided, patient verbalizes understanding; denies any other questions or concerns; instructed to call back for any new or worsening symptoms. Attention Provider: Thank you for allowing me to participate in the care of your patient. The patient was connected to triage in response to information provided to the [facility]. Please do not respond through this encounter as the response is not directed to a shared pool. Reason for Disposition   SEVERE sore throat pain    Answer Assessment - Initial Assessment Questions  1. ONSET: \"When did the throat start hurting? \" (Hours or days ago)       Symptoms started gradually 3 days ago. Getting worse now. 2. SEVERITY: \"How bad is the sore throat? \" (Scale 1-10; mild, moderate or severe)    - MILD (1-3):  doesn't interfere with eating or normal activities    - MODERATE (4-7): interferes with eating some solids and normal activities    - SEVERE (8-10):  excruciating pain, interferes with most normal activities    - SEVERE DYSPHAGIA: can't swallow liquids, drooling    Pain last night was horrible  Pain today was a little better. Now starting to hurt again. Pain is 7-8/10. Still able to swallow    3. STREP EXPOSURE: \"Has there been any exposure to strep within the past week? \" If so, ask: \"What type of contact occurred? \"      Daughter was sick but unknown if strep    4. VIRAL SYMPTOMS: [place] [place] there any symptoms of a cold, such as a runny nose, cough, hoarse voice or red eyes? \"   No cough  Some congestion and runny     5. FEVER: \"Do you have a fever? \" If so, ask: \"What is your temperature, how was it measured, and when did it start? \"     No fever this morning      6. PUS ON THE TONSILS: \"Is there pus on the tonsils in the back of your throat? \"    Throat looks red    7. OTHER SYMPTOMS: \"Do you have any other symptoms? \" (e.g., difficulty breathing, headache, rash)  Headache, runny nose    8. PREGNANCY: \"Is there any chance you are pregnant? \" \"When was your last menstrual period? \"    Protocols used: SORE THROAT-ADULT-OH

## 2021-08-18 ENCOUNTER — OFFICE VISIT (OUTPATIENT)
Dept: FAMILY MEDICINE CLINIC | Age: 66
End: 2021-08-18
Payer: MEDICARE

## 2021-08-18 VITALS
OXYGEN SATURATION: 99 % | HEIGHT: 64 IN | HEART RATE: 91 BPM | SYSTOLIC BLOOD PRESSURE: 130 MMHG | BODY MASS INDEX: 18.27 KG/M2 | WEIGHT: 107 LBS | DIASTOLIC BLOOD PRESSURE: 70 MMHG

## 2021-08-18 DIAGNOSIS — K14.8 TONGUE LESION: ICD-10-CM

## 2021-08-18 DIAGNOSIS — R10.9 ABDOMINAL DISCOMFORT: ICD-10-CM

## 2021-08-18 DIAGNOSIS — Z00.00 HEALTHCARE MAINTENANCE: ICD-10-CM

## 2021-08-18 DIAGNOSIS — R79.9 ABNORMAL FINDING OF BLOOD CHEMISTRY, UNSPECIFIED: ICD-10-CM

## 2021-08-18 DIAGNOSIS — Z12.11 COLON CANCER SCREENING: ICD-10-CM

## 2021-08-18 DIAGNOSIS — R09.82 PND (POST-NASAL DRIP): ICD-10-CM

## 2021-08-18 DIAGNOSIS — R93.89 ABNORMAL FINDINGS ON DIAGNOSTIC IMAGING OF OTHER SPECIFIED BODY STRUCTURES: ICD-10-CM

## 2021-08-18 DIAGNOSIS — M81.0 AGE-RELATED OSTEOPOROSIS WITHOUT CURRENT PATHOLOGICAL FRACTURE: ICD-10-CM

## 2021-08-18 DIAGNOSIS — Z76.89 ENCOUNTER TO ESTABLISH CARE: Primary | ICD-10-CM

## 2021-08-18 PROBLEM — M47.817 LUMBOSACRAL SPONDYLOSIS WITHOUT MYELOPATHY: Status: ACTIVE | Noted: 2019-10-22

## 2021-08-18 PROBLEM — R63.6 MILDLY UNDERWEIGHT ADULT: Status: ACTIVE | Noted: 2020-08-21

## 2021-08-18 PROBLEM — F32.0 CURRENT MILD EPISODE OF MAJOR DEPRESSIVE DISORDER WITHOUT PRIOR EPISODE (HCC): Status: ACTIVE | Noted: 2021-03-11

## 2021-08-18 PROBLEM — G89.4 CHRONIC PAIN DISORDER: Status: ACTIVE | Noted: 2020-10-26

## 2021-08-18 PROBLEM — K21.9 GERD WITHOUT ESOPHAGITIS: Status: ACTIVE | Noted: 2019-10-22

## 2021-08-18 PROBLEM — I47.10 SVT (SUPRAVENTRICULAR TACHYCARDIA): Status: ACTIVE | Noted: 2020-08-10

## 2021-08-18 PROBLEM — R00.2 PALPITATIONS: Status: ACTIVE | Noted: 2020-08-10

## 2021-08-18 PROBLEM — R87.810 CERVICAL HIGH RISK HPV (HUMAN PAPILLOMAVIRUS) TEST POSITIVE: Status: ACTIVE | Noted: 2020-08-13

## 2021-08-18 PROBLEM — I47.1 SVT (SUPRAVENTRICULAR TACHYCARDIA) (HCC): Status: ACTIVE | Noted: 2020-08-10

## 2021-08-18 PROBLEM — M46.1 INFLAMMATION OF SACROILIAC JOINT (HCC): Status: ACTIVE | Noted: 2021-03-11

## 2021-08-18 PROCEDURE — 1036F TOBACCO NON-USER: CPT | Performed by: FAMILY MEDICINE

## 2021-08-18 PROCEDURE — 3017F COLORECTAL CA SCREEN DOC REV: CPT | Performed by: FAMILY MEDICINE

## 2021-08-18 PROCEDURE — G8399 PT W/DXA RESULTS DOCUMENT: HCPCS | Performed by: FAMILY MEDICINE

## 2021-08-18 PROCEDURE — 1090F PRES/ABSN URINE INCON ASSESS: CPT | Performed by: FAMILY MEDICINE

## 2021-08-18 PROCEDURE — G8419 CALC BMI OUT NRM PARAM NOF/U: HCPCS | Performed by: FAMILY MEDICINE

## 2021-08-18 PROCEDURE — G8427 DOCREV CUR MEDS BY ELIG CLIN: HCPCS | Performed by: FAMILY MEDICINE

## 2021-08-18 PROCEDURE — 1123F ACP DISCUSS/DSCN MKR DOCD: CPT | Performed by: FAMILY MEDICINE

## 2021-08-18 PROCEDURE — 4040F PNEUMOC VAC/ADMIN/RCVD: CPT | Performed by: FAMILY MEDICINE

## 2021-08-18 PROCEDURE — 99204 OFFICE O/P NEW MOD 45 MIN: CPT | Performed by: FAMILY MEDICINE

## 2021-08-18 RX ORDER — LORATADINE 10 MG/1
10 TABLET, ORALLY DISINTEGRATING ORAL DAILY
COMMUNITY
End: 2021-11-22

## 2021-08-18 RX ORDER — FLUTICASONE PROPIONATE 50 MCG
1 SPRAY, SUSPENSION (ML) NASAL DAILY
COMMUNITY
End: 2021-11-22

## 2021-08-18 RX ORDER — AZELASTINE 1 MG/ML
1 SPRAY, METERED NASAL 2 TIMES DAILY
Qty: 1 BOTTLE | Refills: 5 | Status: SHIPPED | OUTPATIENT
Start: 2021-08-18 | End: 2022-05-04 | Stop reason: ALTCHOICE

## 2021-08-18 RX ORDER — ACETAMINOPHEN 325 MG/1
650 TABLET ORAL EVERY 6 HOURS PRN
COMMUNITY
End: 2021-11-22

## 2021-08-18 ASSESSMENT — ENCOUNTER SYMPTOMS
VOMITING: 0
DIARRHEA: 0
CONSTIPATION: 0
SHORTNESS OF BREATH: 0
BACK PAIN: 1
NAUSEA: 0
BLOOD IN STOOL: 0
COLOR CHANGE: 0
ABDOMINAL PAIN: 0
COUGH: 0
TROUBLE SWALLOWING: 0

## 2021-08-18 NOTE — PROGRESS NOTES
8/18/2021    This is a 77 y.o. female who presents for  Chief Complaint   Patient presents with    New Patient    Establish Care       HPI:     New pt  She reviewed her medical history in great detail today     + \"burning mouth syndrome\"  Chronic  Her previous dentist was aware of this  + newer tongue lesion, changes in color/darkness  No pain  + PND    Having more health issues overall  Started 3 years ago  Worked at [employer] on [employer] department  Had a hole in her retina, Saw Dr. Katherine Soriano, s/p vitrectomy, then cataracts surgery, L eye   Then had R cataract surgery   4 days ago: has millions of tiny floaters, with a cob web sensation   Saw Dr. Red Oglesby, Cataract specialist in [facility]   Has a detached retina, was crying yesterday over this, was extremely scary   Going back to Dr. Katherine Soriano tomorrow      Had \"Horrible back problems for the third time in her life\"  Went to [facility], Dr. Susan Moore for a second opinion  S/p steroid injections, RFA  Manageable and tolerable now, she is mad at herself for doing all of the interventions, as they didn't help     + osteoporosis, OA in lower back  Better now! Took Fosamax for 5 years    Hx of Costochondritis, dx'd by her gynecologist     Saw Dr. Doni Hagen, GI  S/p EGD, manometry   Needs a colonoscopy, canceled due to moving and couldn't fine her prep at the time     Saw a rheumatologist due to her arthralgias, \"they found nothing\"  \"she knows something is going on with her body\"      Past Medical History:   Diagnosis Date    Allergic rhinitis, cause unspecified     Osteoporosis, unspecified     Pure hypercholesterolemia     Unspecified acute reaction to stress        No past surgical history on file.     Social History     Socioeconomic History    Marital status:      Spouse name: Not on file    Number of children: Not on file    Years of education: Not on file    Highest education level: Not on file   Occupational History    Not on file   Tobacco Use  Smoking status: Former Smoker     Packs/day: 1.00     Years: 8.00     Pack years: 8.00     Types: Cigarettes     Quit date: 1982     Years since quittin.6    Smokeless tobacco: Never Used   Substance and Sexual Activity    Alcohol use: Yes     Alcohol/week: 0.0 standard drinks    Drug use: No    Sexual activity: Not on file   Other Topics Concern    Not on file   Social History Narrative    Not on file     Social Determinants of Health     Financial Resource Strain:     Difficulty of Paying Living Expenses:    Food Insecurity:     Worried About Running Out of Food in the Last Year:     [address] in the Last Year:    Transportation Needs:     Lack of Transportation (Medical):  Lack of Transportation (Non-Medical):    Physical Activity:     Days of Exercise per Week:     Minutes of Exercise per Session:    Stress:     Feeling of Stress :    Social Connections:     Frequency of Communication with Friends and Family:     Frequency of Social Gatherings with Friends and Family:     Attends L[employer] Services:     Active Member of Clubs or Organizations:     Attends Club or Organization Meetings:     Marital Status:    Intimate Partner Violence:     Fear of Current or Ex-Partner:     Emotionally Abused:     Physically Abused:     Sexually Abused:        Family History   Problem Relation Age of Onset    Heart Disease Mother     COPD Father        Current Outpatient Medications   Medication Sig Dispense Refill    loratadine (CLARITIN REDITABS) 10 MG dissolvable tablet Take 10 mg by mouth daily      acetaminophen (TYLENOL) 325 MG tablet Take 650 mg by mouth every 6 hours as needed for Pain      fluticasone (FLONASE) 50 MCG/ACT nasal spray 1 spray by Each Nostril route daily       No current facility-administered medications for this visit.        Immunization History   Administered Date(s) Administered    COVID-19, Pfizer, PF, 30mcg/0.3mL 2021, 2021    Influenza Virus Vaccine 12/13/2011, 09/29/2017    Influenza, Intradermal, Preservative free 10/14/2015    Influenza, Intradermal, Quadrivalent, Preservative Free 09/30/2016    Tdap (Boostrix, Adacel) 03/09/2016       Allergies   Allergen Reactions    Codeine Other (See Comments) and Nausea And Vomiting     GI       Review of Systems   Constitutional: Negative for activity change, appetite change, chills, diaphoresis, fatigue, fever and unexpected weight change. HENT: Positive for congestion and postnasal drip. Negative for ear pain, hearing loss and trouble swallowing. Eyes: Positive for visual disturbance. Respiratory: Negative for cough and shortness of breath. Cardiovascular: Negative for chest pain, palpitations and leg swelling. Gastrointestinal: Negative for abdominal pain, blood in stool, constipation, diarrhea, nausea and vomiting. Genitourinary: Negative for decreased urine volume, difficulty urinating, dysuria, flank pain, hematuria and urgency. Musculoskeletal: Positive for arthralgias and back pain. Skin: Negative for color change and rash. Neurological: Negative for dizziness, weakness, light-headedness, numbness and headaches. Psychiatric/Behavioral: Negative for dysphoric mood and sleep disturbance. The patient is nervous/anxious. /70 (Site: Left Upper Arm, Position: Sitting, Cuff Size: Medium Adult)   Pulse 91   Ht 5' 4\" (1.626 m)   Wt 107 lb (48.5 kg)   SpO2 99%   BMI 18.37 kg/m²     Physical Exam  Vitals and nursing note reviewed. Constitutional:       General: She is not in acute distress. Appearance: She is well-developed. She is not diaphoretic. HENT:      Head: Normocephalic and atraumatic. Mouth/Throat:        Comments: Discoloration, observed from a distance   Eyes:      Conjunctiva/sclera: Conjunctivae normal.      Pupils: Pupils are equal, round, and reactive to light. Neck:      Vascular: No JVD.    Cardiovascular:      Rate and Rhythm: Normal rate and regular rhythm. Heart sounds: Normal heart sounds. No murmur heard. No friction rub. No gallop. Pulmonary:      Effort: Pulmonary effort is normal. No respiratory distress. Breath sounds: Normal breath sounds. No wheezing or rales. Chest:      Chest wall: No tenderness. Abdominal:      Palpations: Abdomen is soft. Tenderness: There is no abdominal tenderness. Musculoskeletal:         General: Normal range of motion. Cervical back: Normal range of motion and neck supple. Skin:     General: Skin is warm and dry. Capillary Refill: Capillary refill takes 2 to 3 seconds. Findings: No erythema. Neurological:      Mental Status: She is alert and oriented to person, place, and time. Psychiatric:         Behavior: Behavior normal.         Thought Content: Thought content normal.         Judgment: Judgment normal.         Plan  1. Encounter to establish care    2. Healthcare maintenance  Will review rest of HCM at next visit in 3 mo  Has an appmnt with her gynecologist this coming month   - Comprehensive Metabolic Panel; Future  - CBC Auto Differential; Future  - Hemoglobin A1C; Future  - Lipid Panel; Future  - Vitamin D 25 Hydroxy; Future  - TSH with Reflex; Future  - Celiac Screen with Reflex; Future    3. Age-related osteoporosis without current pathological fracture  - DEXA BONE DENSITY AXIAL SKELETON; Future  - Comprehensive Metabolic Panel; Future  - Vitamin D 25 Hydroxy; Future  - TSH with Reflex; Future    4. Tongue lesion  - [facility]  - CBC Auto Differential; Future    5. Abdominal discomfort  - Comprehensive Metabolic Panel; Future  - CBC Auto Differential; Future  - Hemoglobin A1C; Future  - Lipid Panel; Future  - Vitamin D 25 Hydroxy; Future  - TSH with Reflex; Future  - Celiac Screen with Reflex; Future  6.  Colon cancer screening  - [facility] - Ezio Quinones MD, Gastroenterology, [facility]    While assessing care for this patient, I have reviewed all pertinent lab work/imaging/ specialist notes and care in reference to those problems addressed above in detail. Appropriate medical decision making was based on this. Please note that portions of this note may have been completed with a voice recognition program. Efforts were made to edit the dictations but occasionally words are mis-transcribed. Return in about 3 months (around 11/18/2021) for 30 minute visit, follow up blood pressure, follow up labs.

## 2021-08-25 ENCOUNTER — OFFICE VISIT (OUTPATIENT)
Dept: ENT CLINIC | Age: 66
End: 2021-08-25
Payer: MEDICARE

## 2021-08-25 VITALS
SYSTOLIC BLOOD PRESSURE: 126 MMHG | DIASTOLIC BLOOD PRESSURE: 67 MMHG | HEIGHT: 64 IN | BODY MASS INDEX: 18.61 KG/M2 | TEMPERATURE: 98.1 F | HEART RATE: 71 BPM | WEIGHT: 109 LBS

## 2021-08-25 DIAGNOSIS — R09.81 NASAL CONGESTION: ICD-10-CM

## 2021-08-25 DIAGNOSIS — K14.6 BURNING MOUTH SYNDROME: ICD-10-CM

## 2021-08-25 DIAGNOSIS — K21.9 GASTROESOPHAGEAL REFLUX DISEASE, UNSPECIFIED WHETHER ESOPHAGITIS PRESENT: Primary | ICD-10-CM

## 2021-08-25 PROCEDURE — 1123F ACP DISCUSS/DSCN MKR DOCD: CPT | Performed by: OTOLARYNGOLOGY

## 2021-08-25 PROCEDURE — 1090F PRES/ABSN URINE INCON ASSESS: CPT | Performed by: OTOLARYNGOLOGY

## 2021-08-25 PROCEDURE — 3017F COLORECTAL CA SCREEN DOC REV: CPT | Performed by: OTOLARYNGOLOGY

## 2021-08-25 PROCEDURE — G8427 DOCREV CUR MEDS BY ELIG CLIN: HCPCS | Performed by: OTOLARYNGOLOGY

## 2021-08-25 PROCEDURE — 1036F TOBACCO NON-USER: CPT | Performed by: OTOLARYNGOLOGY

## 2021-08-25 PROCEDURE — G8399 PT W/DXA RESULTS DOCUMENT: HCPCS | Performed by: OTOLARYNGOLOGY

## 2021-08-25 PROCEDURE — 4040F PNEUMOC VAC/ADMIN/RCVD: CPT | Performed by: OTOLARYNGOLOGY

## 2021-08-25 PROCEDURE — G8420 CALC BMI NORM PARAMETERS: HCPCS | Performed by: OTOLARYNGOLOGY

## 2021-08-25 PROCEDURE — 99204 OFFICE O/P NEW MOD 45 MIN: CPT | Performed by: OTOLARYNGOLOGY

## 2021-08-25 ASSESSMENT — ENCOUNTER SYMPTOMS
TROUBLE SWALLOWING: 0
EYE ITCHING: 0
COUGH: 0
SHORTNESS OF BREATH: 0
APNEA: 0
FACIAL SWELLING: 0
VOICE CHANGE: 0
SORE THROAT: 0
SINUS PRESSURE: 0

## 2021-08-25 NOTE — PATIENT INSTRUCTIONS
Keep food diary- juanita foods daily and severity of symptoms- see if there is a trend    Start nasal saline spray for nose- nasal congestion/dryness    Sodium Lauryl Sulfate free products for the mouth      Patient Education        Gastroesophageal Reflux Disease (GERD): Care Instructions  Overview     Gastroesophageal reflux disease (GERD) is the backward flow of stomach acid into the esophagus. The esophagus is the tube that leads from your throat to your stomach. A one-way valve prevents the stomach acid from backing up into this tube. But when you have GERD, this valve does not close tightly enough. This can also cause pain and swelling in your esophagus. (This is called esophagitis.)  If you have mild GERD symptoms including heartburn, you may be able to control the problem with antacids or over-the-counter medicine. You can also make lifestyle changes to help reduce your symptoms. These include changing your diet and eating habits, such as not eating late at night and losing weight. Follow-up care is a key part of your treatment and safety. Be sure to make and go to all appointments, and call your doctor if you are having problems. It's also a good idea to know your test results and keep a list of the medicines you take. How can you care for yourself at home? · Take your medicines exactly as prescribed. Call your doctor if you think you are having a problem with your medicine. · Your doctor may recommend over-the-counter medicine. For mild or occasional indigestion, antacids, such as Tums, Gaviscon, Mylanta, or Maalox, may help. Your doctor also may recommend over-the-counter acid reducers, such as famotidine (Pepcid AC), cimetidine (Tagamet HB), or omeprazole (Prilosec). Read and follow all instructions on the label. If you use these medicines often, talk with your doctor. · Change your eating habits. ? It's best to eat several small meals instead of two or three large meals. ?  After you eat, wait 2 to 3 hours before you lie down. ? Chocolate, mint, and alcohol can make GERD worse. ? Spicy foods, foods that have a lot of acid (like tomatoes and oranges), and coffee can make GERD symptoms worse in some people. If your symptoms are worse after you eat a certain food, you may want to stop eating that food to see if your symptoms get better. · Do not smoke or chew tobacco. Smoking can make GERD worse. If you need help quitting, talk to your doctor about stop-smoking programs and medicines. These can increase your chances of quitting for good. · If you have GERD symptoms at night, raise the head of your bed 6 to 8 inches by putting the frame on blocks or placing a foam wedge under the head of your mattress. (Adding extra pillows does not work.)  · Do not wear tight clothing around your middle. · Lose weight if you need to. Losing just 5 to 10 pounds can help. When should you call for help? Call your doctor now or seek immediate medical care if:    · You have new or different belly pain.     · Your stools are black and tarlike or have streaks of blood. Watch closely for changes in your health, and be sure to contact your doctor if:    · Your symptoms have not improved after 2 days.     · Food seems to catch in your throat or chest.   Where can you learn more? Go to https://[employer]Cred.Borqs. org and sign in to your [employer] account. Enter C411 in the [facility] box to learn more about \"Gastroesophageal Reflux Disease (GERD): Care Instructions. \"     If you do not have an account, please click on the \"Sign Up Now\" link. Current as of: February 10, 2021               Content Version: 12.9  © 8295-0630 Healthwise, Incorporated. Care instructions adapted under license by [facility] ([facility]). If you have questions about a medical condition or this instruction, always ask your healthcare professional. [address] any warranty or liability for your use of this information.

## 2021-08-25 NOTE — PROGRESS NOTES
[address], [address], [address] [place] [address]  P: [phone]       Patient     Hannah Moore  1955    ChiefComplaint     Chief Complaint   Patient presents with    Other     Burning mouth syndrome; States her tongue will get a \"big red spot\" has had the spot a couple of times over the last year. Sometimes her whole mouth is burning and her throat will hurt. History of Present Illness     Margi Vance is a 59-year-old female here today for evaluation of throat pain. Reports burning in her throat and back part of her tongue that has been occurring since winter. Treated successfully with acid reflux medication. She stopped the medication secondary to concerns over bone density as she had been previously diagnosed with osteoporosis. Since then has not had any burping belching or chest heaviness but has had burning in the back of her throat that she describes as significant. Has noticed worsening symptoms when drinking iced mocha from [employer]. Believes dairy may also make her symptoms worse. Did have a red spot on her tongue that was there for approximately 3 weeks but has since resolved and this was an isolated incident.     Past Medical History     Past Medical History:   Diagnosis Date    Allergic rhinitis, cause unspecified     Osteoporosis, unspecified     Pure hypercholesterolemia     Unspecified acute reaction to stress        Past Surgical History     Past Surgical History:   Procedure Laterality Date    EYE SURGERY Right        Family History     Family History   Problem Relation Age of Onset    Heart Disease Mother     COPD Father        Social History     Social History     Tobacco Use    Smoking status: Former Smoker     Packs/day: 1.00     Years: 8.00     Pack years: 8.00     Types: Cigarettes     Quit date: 1982     Years since quittin.6    Smokeless tobacco: Never Used   Vaping Use    Vaping Use: Never used   Substance Use Topics    Alcohol use: Yes     Alcohol/week: 0.0 standard drinks    Drug use: No        Allergies     Allergies   Allergen Reactions    Codeine Other (See Comments) and Nausea And Vomiting     GI       Medications     Current Outpatient Medications   Medication Sig Dispense Refill    loratadine (CLARITIN REDITABS) 10 MG dissolvable tablet Take 10 mg by mouth daily      acetaminophen (TYLENOL) 325 MG tablet Take 650 mg by mouth every 6 hours as needed for Pain      azelastine (ASTELIN) 0.1 % nasal spray 1 spray by Nasal route 2 times daily Use in each nostril as directed 1 Bottle 5    fluticasone (FLONASE) 50 MCG/ACT nasal spray 1 spray by Each Nostril route daily (Patient not taking: Reported on 8/25/2021)       No current facility-administered medications for this visit. Review of Systems     Review of Systems   Constitutional: Negative for appetite change, chills, fatigue, fever and unexpected weight change. HENT: Positive for congestion and mouth sores. Negative for ear discharge, ear pain, facial swelling, hearing loss, nosebleeds, postnasal drip, sinus pressure, sneezing, sore throat, tinnitus, trouble swallowing and voice change. Eyes: Negative for itching. Respiratory: Negative for apnea, cough and shortness of breath. Gastrointestinal:        Negative for dysphasia   Endocrine: Negative for cold intolerance and heat intolerance. Musculoskeletal: Negative for myalgias and neck pain. Skin: Negative for rash. Allergic/Immunologic: Negative for environmental allergies. Neurological: Negative for dizziness and headaches. Psychiatric/Behavioral: Negative for confusion, decreased concentration and sleep disturbance.          PhysicalExam     Vitals:    08/25/21 1403 08/25/21 1411   BP:  126/67   Site:  Left Upper Arm   Position:  Sitting   Cuff Size:  Small Adult   Pulse:  71   Temp: 98.1 °F (36.7 °C)    TempSrc: Infrared    Weight: 109 lb (49.4 kg)    Height: 5' 4\" (1.626 m)        Physical Exam  Constitutional:       General: She is not in acute distress. Appearance: She is well-developed. HENT:      Head: Normocephalic and atraumatic. Right Ear: Tympanic membrane, ear canal and external ear normal. No drainage. No middle ear effusion. Tympanic membrane is not bulging. Tympanic membrane has normal mobility. Left Ear: Tympanic membrane, ear canal and external ear normal. No drainage. No middle ear effusion. Tympanic membrane is not bulging. Tympanic membrane has normal mobility. Nose: No septal deviation, mucosal edema or rhinorrhea. Mouth/Throat:      Lips: Pink. Mouth: Mucous membranes are moist.      Tongue: No lesions. Palate: No mass. Pharynx: Uvula midline. Tonsils: No tonsillar exudate. 1+ on the right. 1+ on the left. Comments: Mirror exam was performed. The nasopharynx, larynx,or hypopharynx were examined. Vocal fold motion was examined. Pertinent findings include: normal  Eyes:      Pupils: Pupils are equal, round, and reactive to light. Neck:      Thyroid: No thyroid mass or thyromegaly. Trachea: Trachea and phonation normal.   Cardiovascular:      Pulses: Normal pulses. Pulmonary:      Effort: Pulmonary effort is normal. No accessory muscle usage or respiratory distress. Breath sounds: No stridor. Musculoskeletal:      Cervical back: Full passive range of motion without pain. Lymphadenopathy:      Head:      Right side of head: No submental or submandibular adenopathy. Left side of head: No submental or submandibular adenopathy. Cervical: No cervical adenopathy. Right cervical: No superficial, deep or posterior cervical adenopathy. Left cervical: No superficial, deep or posterior cervical adenopathy. Skin:     General: Skin is warm and dry. Neurological:      Mental Status: She is alert and oriented to person, place, and time. Cranial Nerves: No cranial nerve deficit.       Coordination: Coordination normal.      Gait: Gait normal.   Psychiatric:         Thought Content: Thought content normal.           Assessment and Plan     1. Burning mouth syndrome  -Difficult to determine if she is truly experiencing oral pain/burning versus throat discomfort secondary to reflux as patient has difficulty describing symptoms and timing  -From what I was able to discern acid reflux and dietary triggers play a significant role in her symptoms  -Recommend food diary with symptom association  -Check dental to care products for sodium lauryl sulfate and use SLS free products    2. Gastroesophageal reflux disease, unspecified whether esophagitis present  -Previously diagnosed and treated successfully with acid reflux medication  -Not interested in medication at this time    3. Nasal congestion  -Use nasal saline spray      Return in 1 month for reevaluation of symptoms and review of food journal.  Again difficult to assess if this is all secondary to acid reflux or true burning mouth syndrome based on patient's description        Taz Whitman DO  8/25/21      Portions of this note were dictated using Dragon.  There may be linguistic errors secondary to the use of this program.

## 2021-09-07 ENCOUNTER — TELEPHONE (OUTPATIENT)
Dept: FAMILY MEDICINE CLINIC | Age: 66
End: 2021-09-07

## 2021-09-07 NOTE — TELEPHONE ENCOUNTER
Wanted to let you know that she goes to [employer] every day for a Latte. Could this be causing an issue with her cholesterol.

## 2021-09-07 NOTE — TELEPHONE ENCOUNTER
Patient called in with some concerns on her Total Cholesterol and the LDL. Patient did read the message in [employer] and states that she already does eat food the way you recommended in the message. Patient also states that she was fasting at the time of lab draw, but only had a little creamer in her coffee with out sugar. Patient wants to know if there is any other recommendations other than diet? possibly exersize? Something she can do before her next appointment?

## 2021-09-09 NOTE — TELEPHONE ENCOUNTER
The cholesterol levels are usually a indicator of the last three months. I also calculate her ten year risk of having a stroke or heart attack based on different variables and this is still under 10, which means a medication is not absolute. She may have more a genetic predisposition as well. The American dietetic association online is a great resource for options. I would recommend at least 30 mins of moderate cardio 5 days a week as an initial goal. We can also discuss this more at her next visit as well. I would recommend avoiding her daily [employer] latte as well.

## 2021-11-13 ENCOUNTER — PATIENT MESSAGE (OUTPATIENT)
Dept: FAMILY MEDICINE CLINIC | Age: 66
End: 2021-11-13

## 2021-11-15 ENCOUNTER — NURSE TRIAGE (OUTPATIENT)
Dept: OTHER | Facility: CLINIC | Age: 66
End: 2021-11-15

## 2021-11-15 NOTE — TELEPHONE ENCOUNTER
From: Niya Garza  To: Dr. Barcenas Relic: 11/13/2021 8:49 AM EST  Subject: Test Results Question    Dr. Saranya Hardy,     Will you please tell me what these results mean? I believe it is saying that I have positive results of having some antibodies against Covid. I just want to be sure since it says positive. I currently do have symptoms of having a sore throat, congestion, runny nose & headache but no fever. I also went to [facility] Thursday, Nov. 11th & got a Covid test done & received the results today & it was negative.      Thank you

## 2021-11-15 NOTE — TELEPHONE ENCOUNTER
Received call from Jessy He at [facility] with [employer]. Brief description of triage: cold symptoms    Triage indicates for patient to be seen in next 24 hours. Patient agreed. States negative covid test Saturday. [facility] [place] not available. Patient states she prefers to be seen in [place]. Care advice provided, patient verbalizes understanding; denies any other questions or concerns; instructed to call back for any new or worsening symptoms. Writer provided warm transfer to Denise at [facility] for appointment scheduling. Attention Provider: Thank you for allowing me to participate in the care of your patient. The patient was connected to triage in response to information provided to the ECC/PSC. Please do not respond through this encounter as the response is not directed to a shared pool. Reason for Disposition   Patient wants to be seen    Answer Assessment - Initial Assessment Questions  1. ONSET: \"When did the nasal discharge start?\"       1 week ago    2. AMOUNT: \"How much discharge is there? \"       Mild    3. COUGH: \"Do you have a cough? \" If yes, ask: \"Describe the color of your sputum\" (clear, white, yellow, green)     Yes, yellow    4. RESPIRATORY DISTRESS: \"Describe your breathing. \"      No    5. FEVER: \"Do you have a fever? \" If so, ask: \"What is your temperature, how was it measured, and when did it start? \"  None    6. SEVERITY: \"Overall, how bad are you feeling right now? \" (e.g., doesn't interfere with normal activities, staying home from school/work, staying in bed)   Feels worse at night, worse today    7. OTHER SYMPTOMS: \"Do you have any other symptoms? \" (e.g., sore throat, earache, wheezing, vomiting)   sore throat, lungs burning    8. PREGNANCY: \"Is there any chance you are pregnant? \" \"When was your last menstrual period? \"  Not addressed do to age    Protocols used: COMMON COLD-ADULT-[place]

## 2021-11-22 ENCOUNTER — TELEPHONE (OUTPATIENT)
Dept: FAMILY MEDICINE CLINIC | Age: 66
End: 2021-11-22

## 2021-11-22 ENCOUNTER — OFFICE VISIT (OUTPATIENT)
Dept: FAMILY MEDICINE CLINIC | Age: 66
End: 2021-11-22
Payer: MEDICARE

## 2021-11-22 VITALS
DIASTOLIC BLOOD PRESSURE: 80 MMHG | WEIGHT: 108 LBS | RESPIRATION RATE: 16 BRPM | OXYGEN SATURATION: 100 % | BODY MASS INDEX: 18.54 KG/M2 | SYSTOLIC BLOOD PRESSURE: 128 MMHG | HEART RATE: 80 BPM

## 2021-11-22 DIAGNOSIS — Z12.31 ENCOUNTER FOR SCREENING MAMMOGRAM FOR MALIGNANT NEOPLASM OF BREAST: ICD-10-CM

## 2021-11-22 DIAGNOSIS — K13.79 MOUTH PAIN: Primary | ICD-10-CM

## 2021-11-22 DIAGNOSIS — Z23 NEED FOR INFLUENZA VACCINATION: ICD-10-CM

## 2021-11-22 DIAGNOSIS — K59.09 OTHER CONSTIPATION: ICD-10-CM

## 2021-11-22 PROBLEM — M79.7 FIBROMYOSITIS: Status: ACTIVE | Noted: 2020-03-09

## 2021-11-22 PROBLEM — M51.369 OTHER INTERVERTEBRAL DISC DEGENERATION, LUMBAR REGION: Status: ACTIVE | Noted: 2020-01-21

## 2021-11-22 PROBLEM — M47.816 DEGENERATIVE ARTHRITIS OF LUMBAR SPINE: Status: ACTIVE | Noted: 2020-03-09

## 2021-11-22 PROBLEM — M43.16 SPONDYLOLISTHESIS, LUMBAR REGION: Status: ACTIVE | Noted: 2020-01-21

## 2021-11-22 PROBLEM — M54.16 LUMBAR RADICULOPATHY, RIGHT: Status: ACTIVE | Noted: 2020-10-20

## 2021-11-22 PROBLEM — M51.36 OTHER INTERVERTEBRAL DISC DEGENERATION, LUMBAR REGION: Status: ACTIVE | Noted: 2020-01-21

## 2021-11-22 PROCEDURE — 1036F TOBACCO NON-USER: CPT | Performed by: FAMILY MEDICINE

## 2021-11-22 PROCEDURE — G8420 CALC BMI NORM PARAMETERS: HCPCS | Performed by: FAMILY MEDICINE

## 2021-11-22 PROCEDURE — G8427 DOCREV CUR MEDS BY ELIG CLIN: HCPCS | Performed by: FAMILY MEDICINE

## 2021-11-22 PROCEDURE — 1090F PRES/ABSN URINE INCON ASSESS: CPT | Performed by: FAMILY MEDICINE

## 2021-11-22 PROCEDURE — G0008 ADMIN INFLUENZA VIRUS VAC: HCPCS | Performed by: FAMILY MEDICINE

## 2021-11-22 PROCEDURE — 1123F ACP DISCUSS/DSCN MKR DOCD: CPT | Performed by: FAMILY MEDICINE

## 2021-11-22 PROCEDURE — 4040F PNEUMOC VAC/ADMIN/RCVD: CPT | Performed by: FAMILY MEDICINE

## 2021-11-22 PROCEDURE — G8399 PT W/DXA RESULTS DOCUMENT: HCPCS | Performed by: FAMILY MEDICINE

## 2021-11-22 PROCEDURE — G8484 FLU IMMUNIZE NO ADMIN: HCPCS | Performed by: FAMILY MEDICINE

## 2021-11-22 PROCEDURE — 90694 VACC AIIV4 NO PRSRV 0.5ML IM: CPT | Performed by: FAMILY MEDICINE

## 2021-11-22 PROCEDURE — 99214 OFFICE O/P EST MOD 30 MIN: CPT | Performed by: FAMILY MEDICINE

## 2021-11-22 PROCEDURE — 3017F COLORECTAL CA SCREEN DOC REV: CPT | Performed by: FAMILY MEDICINE

## 2021-11-22 RX ORDER — BIOTIN 1 MG
TABLET ORAL DAILY
COMMUNITY

## 2021-11-22 RX ORDER — MULTIVIT WITH MINERALS/LUTEIN
250 TABLET ORAL DAILY
COMMUNITY

## 2021-11-22 RX ORDER — CHOLECALCIFEROL (VITAMIN D3) 1250 MCG
CAPSULE ORAL DAILY
COMMUNITY

## 2021-11-22 RX ORDER — GUARN/MA-HUANG/P.GIN/S.GINSENG
TABLET ORAL 2 TIMES DAILY
COMMUNITY

## 2021-11-22 ASSESSMENT — ENCOUNTER SYMPTOMS
BLOOD IN STOOL: 0
SHORTNESS OF BREATH: 0
DIARRHEA: 0
ABDOMINAL PAIN: 0
VOMITING: 0
CONSTIPATION: 1
NAUSEA: 0

## 2021-11-22 NOTE — TELEPHONE ENCOUNTER
Viscous lidocaine 2% 150 mL  Diphenhydramine 12.5 mg/5 ml  20 mL  Hydrocortisone 100 mg   Nystatin suspension 20 mL     Please let them know.  Thank you

## 2021-11-22 NOTE — TELEPHONE ENCOUNTER
Rae with [facility] calling back needing to know what ingredients  wants in the rx [id number]

## 2021-11-22 NOTE — PROGRESS NOTES
2021    This is a 77 y.o. female who presents for  Chief Complaint   Patient presents with    Other     burning tongue/back of throat, dry mouth, red spots on tongue       HPI:     3 mo f/u     + continued burning mouth sensations  Saw ENT, can't recall their recommendations or advice at this time  Intense pain, intermittent  Blood pressure will increase due to severe pain   Still worried about her tongue lesion   Keeps her mouth incredibly clean  Brushes twice daily, tongue; uses mouth wash    Had EGD, manometry with GI, Dx'd with GERD  Takes TUMS, Pepcid   Refused PPIs, states she couldn't function and felt horrible, worsened Sxs, feels the burning is a side effect of this medication     22, scheduled for a colonoscopy with Dr. Andrade [facility] discussed     + constipation   Getting into young living essential oils, wants to order a new one to help     Past Medical History:   Diagnosis Date    Allergic rhinitis, cause unspecified     Osteoporosis, unspecified     Pure hypercholesterolemia     Unspecified acute reaction to stress        Past Surgical History:   Procedure Laterality Date    EYE SURGERY Right        Social History     Socioeconomic History    Marital status:      Spouse name: Not on file    Number of children: Not on file    Years of education: Not on file    Highest education level: Not on file   Occupational History    Not on file   Tobacco Use    Smoking status: Former Smoker     Packs/day: 1.00     Years: 8.00     Pack years: 8.00     Types: Cigarettes     Quit date: 1982     Years since quittin.9    Smokeless tobacco: Never Used   Vaping Use    Vaping Use: Never used   Substance and Sexual Activity    Alcohol use:  Yes     Alcohol/week: 0.0 standard drinks    Drug use: No    Sexual activity: Not on file   Other Topics Concern    Not on file   Social History Narrative    Not on file     Social Determinants of Health     Financial Resource Strain:  Difficulty of Paying Living Expenses: Not on file   Food Insecurity:     Worried About Running Out of Food in the Last Year: Not on file    Ran Out of Food in the Last Year: Not on file   Transportation Needs:     Lack of Transportation (Medical): Not on file    Lack of Transportation (Non-Medical):  Not on file   Physical Activity:     Days of Exercise per Week: Not on file    Minutes of Exercise per Session: Not on file   Stress:     Feeling of Stress : Not on file   Social Connections:     Frequency of Communication with Friends and Family: Not on file    Frequency of Social Gatherings with Friends and Family: Not on file    Attends Sabianism Services: Not on file    Active Member of [address] [place] [address] [employer] or Organizations: Not on file    Attends Club or Organization Meetings: Not on file    Marital Status: Not on file   Intimate Partner Violence:     Fear of Current or Ex-Partner: Not on file    Emotionally Abused: Not on file    Physically Abused: Not on file    Sexually Abused: Not on file   Housing Stability:     Unable to Pay for Housing in the Last Year: Not on file    Number of [place] in the Last Year: Not on file    Unstable Housing in the Last Year: Not on file       Family History   Problem Relation Age of Onset    Heart Disease Mother     COPD Father        Current Outpatient Medications   Medication Sig Dispense Refill    Calcium 600-200 MG-UNIT TABS Take by mouth 2 times daily      NONFORMULARY 2 times daily agilease for bones      Cholecalciferol (VITAMIN D3) 1.25 MG (34697 UT) CAPS Take by mouth daily      Ascorbic Acid (VITAMIN C) 250 MG tablet Take 250 mg by mouth daily      Biotin 1000 MCG TABS Take by mouth daily      miles mixture (MILES MIXTURE) Swish and swallow 5 mLs 4 times daily as needed (mouth pain) 120 mL 1    azelastine (ASTELIN) 0.1 % nasal spray 1 spray by Nasal route 2 times daily Use in each nostril as directed 1 Bottle 5     No current facility-administered medications for this visit. Immunization History   Administered Date(s) Administered    COVID-19, Pfizer, PF, 30mcg/0.3mL 02/27/2021, 04/07/2021    Influenza Virus Vaccine 12/13/2011, 09/29/2017    Influenza, Intradermal, Preservative free 10/14/2015    Influenza, Intradermal, Quadrivalent, Preservative Free 09/30/2016    Influenza, Quadv, IM, (6 mo and older Fluzone, Flulaval, Fluarix and 3 yrs and older Afluria) 10/14/2015, 09/30/2016, 09/29/2017    Influenza, Michelle Crass, IM, PF (6 mo and older Fluzone, Flulaval, Fluarix, and 3 yrs and older Afluria) 09/25/2017, 11/20/2019    Influenza, Michelle Craky, adjuvanted, 65 yrs +, IM, PF (Fluad) 11/22/2021    Tdap (Boostrix, Adacel) 03/09/2016       Allergies   Allergen Reactions    Codeine Other (See Comments) and Nausea And Vomiting     GI       Review of Systems   Constitutional: Negative for activity change, appetite change, chills, diaphoresis, fatigue, fever and unexpected weight change. HENT: Positive for mouth sores. Respiratory: Negative for shortness of breath. Cardiovascular: Negative for chest pain. Gastrointestinal: Positive for constipation. Negative for abdominal pain, blood in stool, diarrhea, nausea and vomiting. Genitourinary: Negative for decreased urine volume and difficulty urinating. Musculoskeletal: Negative for arthralgias. Skin: Negative for rash. Allergic/Immunologic: Negative for immunocompromised state. Neurological: Negative for dizziness, light-headedness and headaches. /80 (Site: Right Upper Arm, Position: Sitting, Cuff Size: Small Adult)   Pulse 80   Resp 16   Wt 108 lb (49 kg)   SpO2 100%   BMI 18.54 kg/m²     Physical Exam  Vitals and nursing note reviewed. Constitutional:       General: She is not in acute distress. Appearance: Normal appearance. She is well-developed. She is not diaphoretic. HENT:      Head: Normocephalic and atraumatic.    Eyes:      Extraocular Movements: Extraocular movements intact. Conjunctiva/sclera: Conjunctivae normal.      Pupils: Pupils are equal, round, and reactive to light. Cardiovascular:      Rate and Rhythm: Normal rate and regular rhythm. Pulmonary:      Effort: Pulmonary effort is normal. No respiratory distress. Abdominal:      Palpations: Abdomen is soft. Musculoskeletal:         General: Normal range of motion. Cervical back: Normal range of motion and neck supple. Skin:     General: Skin is warm and dry. Neurological:      Mental Status: She is alert and oriented to person, place, and time. Psychiatric:         Attention and Perception: She is attentive. Mood and Affect: Mood normal.         Speech: Speech normal.         Behavior: Behavior normal.         Thought Content: Thought content normal.         Judgment: Judgment normal.         Plan  1. Mouth pain  - miles mixture (MILES MIXTURE); Swish and swallow 5 mLs 4 times daily as needed (mouth pain)  Dispense: 120 mL; Refill: 1    2. Need for influenza vaccination  - INFLUENZA, QUADV, ADJUVANTED, 65 YRS =, IM, PF, PREFILL SYR, 0.5ML (FLUAD)    3. Encounter for screening mammogram for malignant neoplasm of breast  - DARRYN DIGITAL SCREEN W OR WO CAD BILATERAL; Future    4. Other constipation  Add more daily fiber, trial metamucil, trial colace if not helpful. While assessing care for this patient, I have reviewed all pertinent lab work/imaging/ specialist notes and care in reference to those problems addressed above in detail. Appropriate medical decision making was based on this. Please note that portions of this note may have been completed with a voice recognition program. Efforts were made to edit the dictations but occasionally words are mis-transcribed. Return in about 6 months (around 5/22/2022) for 30 minute visit.

## 2022-03-21 ENCOUNTER — OFFICE VISIT (OUTPATIENT)
Dept: FAMILY MEDICINE CLINIC | Age: 67
End: 2022-03-21
Payer: MEDICARE

## 2022-03-21 VITALS
OXYGEN SATURATION: 93 % | BODY MASS INDEX: 18.88 KG/M2 | SYSTOLIC BLOOD PRESSURE: 116 MMHG | HEART RATE: 81 BPM | DIASTOLIC BLOOD PRESSURE: 70 MMHG | WEIGHT: 110 LBS

## 2022-03-21 DIAGNOSIS — N30.00 ACUTE CYSTITIS WITHOUT HEMATURIA: Primary | ICD-10-CM

## 2022-03-21 LAB
BILIRUBIN, POC: NORMAL
BLOOD URINE, POC: NORMAL
CLARITY, POC: CLEAR
COLOR, POC: YELLOW
GLUCOSE URINE, POC: NORMAL
KETONES, POC: NORMAL
LEUKOCYTE EST, POC: NORMAL
NITRITE, POC: NORMAL
PH, POC: 7.5
PROTEIN, POC: NORMAL
SPECIFIC GRAVITY, POC: 1.01
UROBILINOGEN, POC: 0.2

## 2022-03-21 PROCEDURE — 81002 URINALYSIS NONAUTO W/O SCOPE: CPT | Performed by: FAMILY MEDICINE

## 2022-03-21 PROCEDURE — 1123F ACP DISCUSS/DSCN MKR DOCD: CPT | Performed by: FAMILY MEDICINE

## 2022-03-21 PROCEDURE — G8420 CALC BMI NORM PARAMETERS: HCPCS | Performed by: FAMILY MEDICINE

## 2022-03-21 PROCEDURE — 1036F TOBACCO NON-USER: CPT | Performed by: FAMILY MEDICINE

## 2022-03-21 PROCEDURE — G8484 FLU IMMUNIZE NO ADMIN: HCPCS | Performed by: FAMILY MEDICINE

## 2022-03-21 PROCEDURE — G8427 DOCREV CUR MEDS BY ELIG CLIN: HCPCS | Performed by: FAMILY MEDICINE

## 2022-03-21 PROCEDURE — 99213 OFFICE O/P EST LOW 20 MIN: CPT | Performed by: FAMILY MEDICINE

## 2022-03-21 PROCEDURE — 1090F PRES/ABSN URINE INCON ASSESS: CPT | Performed by: FAMILY MEDICINE

## 2022-03-21 PROCEDURE — G8399 PT W/DXA RESULTS DOCUMENT: HCPCS | Performed by: FAMILY MEDICINE

## 2022-03-21 PROCEDURE — 4040F PNEUMOC VAC/ADMIN/RCVD: CPT | Performed by: FAMILY MEDICINE

## 2022-03-21 PROCEDURE — 3017F COLORECTAL CA SCREEN DOC REV: CPT | Performed by: FAMILY MEDICINE

## 2022-03-21 RX ORDER — SULFAMETHOXAZOLE AND TRIMETHOPRIM 800; 160 MG/1; MG/1
1 TABLET ORAL 2 TIMES DAILY
Qty: 10 TABLET | Refills: 0 | Status: SHIPPED | OUTPATIENT
Start: 2022-03-21 | End: 2022-03-26

## 2022-03-21 SDOH — ECONOMIC STABILITY: HOUSING INSECURITY
IN THE LAST 12 MONTHS, WAS THERE A TIME WHEN YOU DID NOT HAVE A STEADY PLACE TO SLEEP OR SLEPT IN A SHELTER (INCLUDING NOW)?: NO

## 2022-03-21 SDOH — ECONOMIC STABILITY: FOOD INSECURITY: WITHIN THE PAST 12 MONTHS, YOU WORRIED THAT YOUR FOOD WOULD RUN OUT BEFORE YOU GOT MONEY TO BUY MORE.: NEVER TRUE

## 2022-03-21 SDOH — ECONOMIC STABILITY: HOUSING INSECURITY: IN THE LAST 12 MONTHS, HOW MANY PLACES HAVE YOU LIVED?: 1

## 2022-03-21 SDOH — ECONOMIC STABILITY: FOOD INSECURITY: WITHIN THE PAST 12 MONTHS, THE FOOD YOU BOUGHT JUST DIDN'T LAST AND YOU DIDN'T HAVE MONEY TO GET MORE.: NEVER TRUE

## 2022-03-21 SDOH — ECONOMIC STABILITY: INCOME INSECURITY: IN THE LAST 12 MONTHS, WAS THERE A TIME WHEN YOU WERE NOT ABLE TO PAY THE MORTGAGE OR RENT ON TIME?: NO

## 2022-03-21 ASSESSMENT — PATIENT HEALTH QUESTIONNAIRE - PHQ9
SUM OF ALL RESPONSES TO PHQ QUESTIONS 1-9: 2
10. IF YOU CHECKED OFF ANY PROBLEMS, HOW DIFFICULT HAVE THESE PROBLEMS MADE IT FOR YOU TO DO YOUR WORK, TAKE CARE OF THINGS AT HOME, OR GET ALONG WITH OTHER PEOPLE: 0
SUM OF ALL RESPONSES TO PHQ QUESTIONS 1-9: 2
3. TROUBLE FALLING OR STAYING ASLEEP: 2
6. FEELING BAD ABOUT YOURSELF - OR THAT YOU ARE A FAILURE OR HAVE LET YOURSELF OR YOUR FAMILY DOWN: 0
9. THOUGHTS THAT YOU WOULD BE BETTER OFF DEAD, OR OF HURTING YOURSELF: 0
2. FEELING DOWN, DEPRESSED OR HOPELESS: 0
4. FEELING TIRED OR HAVING LITTLE ENERGY: 0
7. TROUBLE CONCENTRATING ON THINGS, SUCH AS READING THE NEWSPAPER OR WATCHING TELEVISION: 0
SUM OF ALL RESPONSES TO PHQ QUESTIONS 1-9: 2
8. MOVING OR SPEAKING SO SLOWLY THAT OTHER PEOPLE COULD HAVE NOTICED. OR THE OPPOSITE, BEING SO FIGETY OR RESTLESS THAT YOU HAVE BEEN MOVING AROUND A LOT MORE THAN USUAL: 0
SUM OF ALL RESPONSES TO PHQ9 QUESTIONS 1 & 2: 0
1. LITTLE INTEREST OR PLEASURE IN DOING THINGS: 0
5. POOR APPETITE OR OVEREATING: 0
SUM OF ALL RESPONSES TO PHQ QUESTIONS 1-9: 2

## 2022-03-21 ASSESSMENT — LIFESTYLE VARIABLES
HOW OFTEN DO YOU HAVE A DRINK CONTAINING ALCOHOL: 2-4 TIMES A MONTH
HOW MANY STANDARD DRINKS CONTAINING ALCOHOL DO YOU HAVE ON A TYPICAL DAY: 1 OR 2

## 2022-03-21 ASSESSMENT — SOCIAL DETERMINANTS OF HEALTH (SDOH): HOW HARD IS IT FOR YOU TO PAY FOR THE VERY BASICS LIKE FOOD, HOUSING, MEDICAL CARE, AND HEATING?: NOT HARD AT ALL

## 2022-03-21 ASSESSMENT — ENCOUNTER SYMPTOMS
NAUSEA: 0
VOMITING: 0

## 2022-03-21 NOTE — PROGRESS NOTES
Nilson Arteaga is a 79 y.o. female    Chief Complaint   Patient presents with    Urinary Tract Infection     Mucus in urine     Lower Back Pain       HPI:    This is a new patient to me. Urinary Tract Infection   This is a new problem. The current episode started in the past 7 days. The problem occurs intermittently. The problem has been gradually worsening. The quality of the pain is described as burning. There has been no fever. Pertinent negatives include no flank pain, hematuria, nausea or vomiting. Associated symptoms comments: Constipation. She has tried nothing for the symptoms. There is no history of recurrent UTIs. ROS:    Review of Systems   Gastrointestinal: Negative for nausea and vomiting. Genitourinary: Negative for flank pain and hematuria. /70   Pulse 81   Wt 110 lb (49.9 kg)   SpO2 93%   BMI 18.88 kg/m²     Physical Exam:    Physical Exam  Constitutional:       General: She is not in acute distress. Appearance: Normal appearance. She is not ill-appearing or toxic-appearing. HENT:      Head: Normocephalic. Neurological:      Mental Status: She is alert. Psychiatric:         Mood and Affect: Mood normal.         Behavior: Behavior normal.         Thought Content:  Thought content normal.         Current Outpatient Medications   Medication Sig Dispense Refill    sulfamethoxazole-trimethoprim (BACTRIM DS;SEPTRA DS) 800-160 MG per tablet Take 1 tablet by mouth 2 times daily for 5 days 10 tablet 0    Calcium 600-200 MG-UNIT TABS Take by mouth 2 times daily      NONFORMULARY 2 times daily agilease for bones      Cholecalciferol (VITAMIN D3) 1.25 MG (76618 UT) CAPS Take by mouth daily      Ascorbic Acid (VITAMIN C) 250 MG tablet Take 250 mg by mouth daily      Biotin 1000 MCG TABS Take by mouth daily      miles mixture (MILES MIXTURE) Swish and swallow 5 mLs 4 times daily as needed (mouth pain) 120 mL 1    azelastine (ASTELIN) 0.1 % nasal spray 1 spray by Nasal route 2 times daily Use in each nostril as directed 1 Bottle 5     No current facility-administered medications for this visit. Assessment:    1. Acute cystitis without hematuria        Plan:    1. Acute cystitis without hematuria  Urine findings were surprisingly normal.  However we will go by symptoms. Given the urine findings, constipation, and slight worsening of back pain, I will go ahead and treat her with Bactrim. She will take it at least for 3 to 5 days. During previous UTI's, her urine findings were not very convincing and she felt better after Bactrim. - POCT Urinalysis no Micro  - Culture, Urine  - sulfamethoxazole-trimethoprim (BACTRIM DS;SEPTRA DS) 800-160 MG per tablet; Take 1 tablet by mouth 2 times daily for 5 days  Dispense: 10 tablet; Refill: 0      Patient to return to clinic if symptoms worsen or fail to improve.

## 2022-03-22 LAB — URINE CULTURE, ROUTINE: NORMAL

## 2022-04-19 ENCOUNTER — OFFICE VISIT (OUTPATIENT)
Dept: FAMILY MEDICINE CLINIC | Age: 67
End: 2022-04-19
Payer: MEDICARE

## 2022-04-19 VITALS
OXYGEN SATURATION: 98 % | WEIGHT: 107.8 LBS | BODY MASS INDEX: 18.5 KG/M2 | SYSTOLIC BLOOD PRESSURE: 116 MMHG | DIASTOLIC BLOOD PRESSURE: 76 MMHG | HEART RATE: 76 BPM

## 2022-04-19 DIAGNOSIS — M47.817 LUMBOSACRAL SPONDYLOSIS WITHOUT MYELOPATHY: Primary | ICD-10-CM

## 2022-04-19 PROBLEM — M46.1 INFLAMMATION OF SACROILIAC JOINT (HCC): Status: RESOLVED | Noted: 2021-03-11 | Resolved: 2022-04-19

## 2022-04-19 PROCEDURE — 99213 OFFICE O/P EST LOW 20 MIN: CPT | Performed by: STUDENT IN AN ORGANIZED HEALTH CARE EDUCATION/TRAINING PROGRAM

## 2022-04-19 PROCEDURE — G8420 CALC BMI NORM PARAMETERS: HCPCS | Performed by: STUDENT IN AN ORGANIZED HEALTH CARE EDUCATION/TRAINING PROGRAM

## 2022-04-19 PROCEDURE — 3017F COLORECTAL CA SCREEN DOC REV: CPT | Performed by: STUDENT IN AN ORGANIZED HEALTH CARE EDUCATION/TRAINING PROGRAM

## 2022-04-19 PROCEDURE — 1090F PRES/ABSN URINE INCON ASSESS: CPT | Performed by: STUDENT IN AN ORGANIZED HEALTH CARE EDUCATION/TRAINING PROGRAM

## 2022-04-19 PROCEDURE — 1123F ACP DISCUSS/DSCN MKR DOCD: CPT | Performed by: STUDENT IN AN ORGANIZED HEALTH CARE EDUCATION/TRAINING PROGRAM

## 2022-04-19 PROCEDURE — G8399 PT W/DXA RESULTS DOCUMENT: HCPCS | Performed by: STUDENT IN AN ORGANIZED HEALTH CARE EDUCATION/TRAINING PROGRAM

## 2022-04-19 PROCEDURE — 4040F PNEUMOC VAC/ADMIN/RCVD: CPT | Performed by: STUDENT IN AN ORGANIZED HEALTH CARE EDUCATION/TRAINING PROGRAM

## 2022-04-19 PROCEDURE — G8427 DOCREV CUR MEDS BY ELIG CLIN: HCPCS | Performed by: STUDENT IN AN ORGANIZED HEALTH CARE EDUCATION/TRAINING PROGRAM

## 2022-04-19 PROCEDURE — 1036F TOBACCO NON-USER: CPT | Performed by: STUDENT IN AN ORGANIZED HEALTH CARE EDUCATION/TRAINING PROGRAM

## 2022-04-19 RX ORDER — MELOXICAM 15 MG/1
15 TABLET ORAL DAILY
Qty: 30 TABLET | Refills: 0 | Status: SHIPPED | OUTPATIENT
Start: 2022-04-19 | End: 2022-05-04 | Stop reason: ALTCHOICE

## 2022-04-19 RX ORDER — TIZANIDINE 2 MG/1
2 TABLET ORAL EVERY 6 HOURS PRN
Qty: 30 TABLET | Refills: 0 | Status: SHIPPED | OUTPATIENT
Start: 2022-04-19 | End: 2022-08-22

## 2022-04-19 NOTE — PROGRESS NOTES
Patient: Clarke Griffin is a 79 y.o. female who presents today with the following Chief Complaint(s):  Chief Complaint   Patient presents with    Back Pain     lower moves down to legs and feet, been getting worse. Referral to go to [facility] Other     sores in the back of throat been going on for sometime     Discuss Medications     start on muscle relaxers, and for pain         HPI     Reports constant low back pain that radiates down right leg. Reports it is the \"sciatic\" nerve. Reports that this alteranates between pain in the middle of the back and going to right side with radiation down leg alternating days. Has worsened over last month. Has been seeing [facility]. Started seeing them in 2020. Has done 3 months of PT back then and then has continued this   Has had 2 epidural injections without improvement in 2020. Had radiofrequency ablation 2020 as well. Saw Neurosurgeon in December 2020. 2 MRIs as well. [place] no evaluation warranting surgery. Has also tried working with chiropractor. Current Outpatient Medications   Medication Sig Dispense Refill    meloxicam (MOBIC) 15 MG tablet Take 1 tablet by mouth daily 30 tablet 0    tiZANidine (ZANAFLEX) 2 MG tablet Take 1 tablet by mouth every 6 hours as needed (muscle spasms) 30 tablet 0    Calcium 600-200 MG-UNIT TABS Take by mouth 2 times daily      NONFORMULARY 2 times daily agilease for bones      Cholecalciferol (VITAMIN D3) 1.25 MG (91808 UT) CAPS Take by mouth daily      Ascorbic Acid (VITAMIN C) 250 MG tablet Take 250 mg by mouth daily      Biotin 1000 MCG TABS Take by mouth daily      miles mixture (MILES MIXTURE) Swish and swallow 5 mLs 4 times daily as needed (mouth pain) 120 mL 1    azelastine (ASTELIN) 0.1 % nasal spray 1 spray by Nasal route 2 times daily Use in each nostril as directed 1 Bottle 5     No current facility-administered medications for this visit.        Patient's past medical history, surgical history, family history, medications,  andallergies  were all reviewed and updated as appropriate today. Review of Systems  All other systems reviewed and negative    Physical Exam  Musculoskeletal:      Comments: No bony tenderness. Mild right SI joint tenderness. Negative straight leg bilaterally with tight hamstrings. Neurological:      Motor: No weakness. Gait: Gait normal.       Vitals:    04/19/22 1350   BP: 116/76   Pulse: 76   SpO2: 98%       Assessment:  Encounter Diagnosis   Name Primary?  Lumbosacral spondylosis without myelopathy Yes       Plan:  1. Lumbosacral spondylosis without myelopathy  Pateint with multiple evaluations over last 2 years by neurosurgery with interventions including PT and epidural injections. Reports that this has been again worsening recently. Will treat with NSAIDs and muscle relaxer. Patient also reports that she is attempting to establish with [facility] for further evaluation.   - meloxicam (MOBIC) 15 MG tablet; Take 1 tablet by mouth daily  Dispense: 30 tablet; Refill: 0  - tiZANidine (ZANAFLEX) 2 MG tablet; Take 1 tablet by mouth every 6 hours as needed (muscle spasms)  Dispense: 30 tablet; Refill: 0        No follow-ups on file.

## 2022-05-04 ENCOUNTER — OFFICE VISIT (OUTPATIENT)
Dept: FAMILY MEDICINE CLINIC | Age: 67
End: 2022-05-04
Payer: MEDICARE

## 2022-05-04 VITALS
WEIGHT: 106 LBS | HEART RATE: 75 BPM | DIASTOLIC BLOOD PRESSURE: 70 MMHG | SYSTOLIC BLOOD PRESSURE: 142 MMHG | OXYGEN SATURATION: 100 % | BODY MASS INDEX: 18.19 KG/M2

## 2022-05-04 DIAGNOSIS — M54.41 CHRONIC BILATERAL LOW BACK PAIN WITH BILATERAL SCIATICA: Primary | ICD-10-CM

## 2022-05-04 DIAGNOSIS — Z87.19 HISTORY OF ORAL LESIONS: ICD-10-CM

## 2022-05-04 DIAGNOSIS — M54.42 CHRONIC BILATERAL LOW BACK PAIN WITH BILATERAL SCIATICA: Primary | ICD-10-CM

## 2022-05-04 DIAGNOSIS — G89.29 CHRONIC BILATERAL LOW BACK PAIN WITH BILATERAL SCIATICA: Primary | ICD-10-CM

## 2022-05-04 DIAGNOSIS — F51.04 PSYCHOPHYSIOLOGICAL INSOMNIA: ICD-10-CM

## 2022-05-04 PROCEDURE — 1090F PRES/ABSN URINE INCON ASSESS: CPT | Performed by: FAMILY MEDICINE

## 2022-05-04 PROCEDURE — 1123F ACP DISCUSS/DSCN MKR DOCD: CPT | Performed by: FAMILY MEDICINE

## 2022-05-04 PROCEDURE — 1036F TOBACCO NON-USER: CPT | Performed by: FAMILY MEDICINE

## 2022-05-04 PROCEDURE — G8427 DOCREV CUR MEDS BY ELIG CLIN: HCPCS | Performed by: FAMILY MEDICINE

## 2022-05-04 PROCEDURE — G8399 PT W/DXA RESULTS DOCUMENT: HCPCS | Performed by: FAMILY MEDICINE

## 2022-05-04 PROCEDURE — 4040F PNEUMOC VAC/ADMIN/RCVD: CPT | Performed by: FAMILY MEDICINE

## 2022-05-04 PROCEDURE — G8419 CALC BMI OUT NRM PARAM NOF/U: HCPCS | Performed by: FAMILY MEDICINE

## 2022-05-04 PROCEDURE — 99214 OFFICE O/P EST MOD 30 MIN: CPT | Performed by: FAMILY MEDICINE

## 2022-05-04 PROCEDURE — 3017F COLORECTAL CA SCREEN DOC REV: CPT | Performed by: FAMILY MEDICINE

## 2022-05-04 RX ORDER — TRAZODONE HYDROCHLORIDE 50 MG/1
50 TABLET ORAL NIGHTLY PRN
Qty: 30 TABLET | Refills: 0 | Status: SHIPPED | OUTPATIENT
Start: 2022-05-04 | End: 2022-06-27

## 2022-05-04 RX ORDER — PREGABALIN 25 MG/1
25 CAPSULE ORAL 2 TIMES DAILY PRN
Qty: 60 CAPSULE | Refills: 3 | Status: SHIPPED | OUTPATIENT
Start: 2022-05-04 | End: 2022-08-22

## 2022-05-04 RX ORDER — VENLAFAXINE HYDROCHLORIDE 37.5 MG/1
37.5 CAPSULE, EXTENDED RELEASE ORAL DAILY
Qty: 30 CAPSULE | Refills: 3 | Status: SHIPPED | OUTPATIENT
Start: 2022-05-04 | End: 2022-08-22

## 2022-05-04 ASSESSMENT — ENCOUNTER SYMPTOMS
VOMITING: 0
NAUSEA: 0
COLOR CHANGE: 0
BACK PAIN: 1
ABDOMINAL PAIN: 0
SHORTNESS OF BREATH: 0

## 2022-05-04 NOTE — TELEPHONE ENCOUNTER
[facility] is calling regarding the rx for the Magic Mouthwash. Pharmacy is needing the exact ingredients and how much of each ingredient for the mouth wash.     Please send new, detailed rx to [facility] per [facility]         diphenhydrAMINE HCl,Nystatin,Lidocaine HCl 5 mLs Swish & Spit 4 TIMES DAILY PRN  Magic Mouthwash  Magic Mouthwash (MIRACLE MOUTHWASH)    Swish and spit 5 mLs 4 times daily as needed for Irritation, Disp-240 mL, R-1  Normal   Dispense: 240 mL   Refills: 1 ordered   Pharmacy: [facility] [address] [phone] - [phone] (Ph: [phone])   Order Details  Ordered on: 05/04/22   Associated Dx: History of oral lesions   Authorizing provider: Miguel A Spears MD

## 2022-05-04 NOTE — TELEPHONE ENCOUNTER
----- Message from [facility] sent at 5/4/2022 12:27 PM EDT -----  Subject: Message to Provider    QUESTIONS  Information for Provider? Patient is calling in regards to a mouth wash   maybe called magic mouth wash, patient said the pharmacy did not have that   prescription when patient called to  her medicine, patient said her   and her provider spoke about this, please call patient back to advise   further. ---------------------------------------------------------------------------  --------------  Junior Gutierrezom INFO  What is the best way for the office to contact you? OK to leave message on   voicemail, OK to respond with electronic message via [employer] portal (only   for patients who have registered [employer] account)  Preferred Call Back Phone Number? [phone]  ---------------------------------------------------------------------------  --------------  SCRIPT ANSWERS  Relationship to Patient?  Self

## 2022-05-04 NOTE — TELEPHONE ENCOUNTER
Called pharmacy spoke with [facility], stated that they received the mouth wash and it is ready for  from the pt.      Spoke with pt, she is aware advised and will go pick medication up

## 2022-05-04 NOTE — PROGRESS NOTES
2022    TELEHEALTH EVALUATION -- Audio/Visual (During DXTDN-50 public health emergency)    HPI:    Yessenia Clement (:  1955) has requested an audio/video evaluation for the following concern(s):    Chief Complaint   Patient presents with    Follow-up     Back pain: chronic, s/p injections/epidurals  Follows with Neurosurgery  PT makes this worse  Has tried cymbalta, gabapentin, zoloft, muscle relaxers, scared to take steroids    + chronic mouth lesions  Tried magic mouthwash , helped a little   Keeps her mouth very clean   Saw ENT, Dr. Rebecca Whitfield   Started after filing divorce 5 years      Review of Systems   Constitutional: Negative for activity change, chills, diaphoresis, fatigue, fever and unexpected weight change. Respiratory: Negative for shortness of breath. Cardiovascular: Negative for chest pain and leg swelling. Gastrointestinal: Negative for abdominal pain, nausea and vomiting. Genitourinary: Negative for difficulty urinating. Musculoskeletal: Positive for arthralgias, back pain, gait problem and myalgias. Negative for joint swelling, neck pain and neck stiffness. Skin: Negative for color change, pallor, rash and wound. Neurological: Negative for weakness, numbness and headaches. Psychiatric/Behavioral: Positive for dysphoric mood and sleep disturbance. Negative for suicidal ideas. The patient is not nervous/anxious. Prior to Visit Medications    Medication Sig Taking? Authorizing Provider   pregabalin (LYRICA) 25 MG capsule Take 1 capsule by mouth 2 times daily as needed (back pain, nerve pain) for up to 30 days.  Yes Purvi Dejesus MD   venlafaxine (EFFEXOR XR) 37.5 MG extended release capsule Take 1 capsule by mouth daily Yes Purvi Dejesus MD   traZODone (DESYREL) 50 MG tablet Take 1 tablet by mouth nightly as needed for Sleep Yes Purvi Dejesus MD   Magic Mouthwash (MIRACLE MOUTHWASH) Swish and spit 5 mLs 4 times daily as needed for Irritation Yes Ema Estrella Nico Beebe MD   tiZANidine (ZANAFLEX) 2 MG tablet Take 1 tablet by mouth every 6 hours as needed (muscle spasms) Yes Beverly Wiley,    Calcium 600-200 MG-UNIT TABS Take by mouth 2 times daily Yes Historical Provider, MD   NONFORMULARY 2 times daily agilease for bones Yes Historical Provider, MD   Cholecalciferol (VITAMIN D3) 1.25 MG (04734 UT) CAPS Take by mouth daily Yes Historical Provider, MD   Ascorbic Acid (VITAMIN C) 250 MG tablet Take 250 mg by mouth daily Yes Historical Provider, MD   Biotin 1000 MCG TABS Take by mouth daily Yes Historical Provider, MD       Social History     Tobacco Use    Smoking status: Former Smoker     Packs/day: 1.00     Years: 8.00     Pack years: 8.00     Types: Cigarettes     Quit date: 1982     Years since quittin.3    Smokeless tobacco: Never Used   Vaping Use    Vaping Use: Never used   Substance Use Topics    Alcohol use: Yes     Alcohol/week: 0.0 standard drinks    Drug use: No        Allergies   Allergen Reactions    Codeine Other (See Comments) and Nausea And Vomiting     GI   ,   Past Medical History:   Diagnosis Date    Allergic rhinitis, cause unspecified     Osteoporosis, unspecified     Pure hypercholesterolemia     Unspecified acute reaction to stress    ,   Past Surgical History:   Procedure Laterality Date    EYE SURGERY Right    ,   Social History     Tobacco Use    Smoking status: Former Smoker     Packs/day: 1.00     Years: 8.00     Pack years: 8.00     Types: Cigarettes     Quit date: 1982     Years since quittin.3    Smokeless tobacco: Never Used   Vaping Use    Vaping Use: Never used   Substance Use Topics    Alcohol use:  Yes     Alcohol/week: 0.0 standard drinks    Drug use: No   ,   Family History   Problem Relation Age of Onset    Heart Disease Mother     COPD Father    ,   Immunization History   Administered Date(s) Administered    COVID-19, Pfizer Purple top, DILUTE for use, 12+ yrs, 30mcg/0.3mL dose 2021, 04/07/2021    Influenza Virus Vaccine 12/13/2011, 09/29/2017    Influenza, Intradermal, Preservative free 10/14/2015    Influenza, Intradermal, Quadrivalent, Preservative Free 09/30/2016    Influenza, Quadv, IM, (6 mo and older Fluzone, Flulaval, Fluarix and 3 yrs and older Afluria) 10/14/2015, 09/30/2016, 09/29/2017    Influenza, Hassel Scarce, IM, PF (6 mo and older Fluzone, Flulaval, Fluarix, and 3 yrs and older Afluria) 09/25/2017, 11/20/2019    Influenza, Hassel Scarce, adjuvanted, 65 yrs +, IM, PF (Fluad) 11/22/2021    Tdap (Boostrix, Adacel) 03/09/2016   ,   Health Maintenance   Topic Date Due    Annual Wellness Visit (AWV)  Never done    Shingles vaccine (1 of 2) Never done    Colorectal Cancer Screen  02/19/2017    Pneumococcal 65+ years Vaccine (1 - PCV) Never done    COVID-19 Vaccine (3 - Booster for Love Peter series) 09/07/2021    Breast cancer screen  08/14/2022    Depression Monitoring  03/21/2023    DTaP/Tdap/Td vaccine (2 - Td or Tdap) 03/09/2026    Lipids  09/02/2026    DEXA (modify frequency per FRAX score)  Completed    Flu vaccine  Completed    Hepatitis C screen  Completed    Hepatitis A vaccine  Aged Out    Hepatitis B vaccine  Aged Out    Hib vaccine  Aged Out    Meningococcal (ACWY) vaccine  Aged Out       PHYSICAL EXAMINATION:  [ INSTRUCTIONS:  \"[x]\" Indicates a positive item  \"[]\" Indicates a negative item  -- DELETE ALL ITEMS NOT EXAMINED]  Vital Signs: (As obtained by patient/caregiver or practitioner observation)    Blood pressure-  Heart rate-    Respiratory rate-    Temperature-  Pulse oximetry-     Constitutional: [x] Appears well-developed and well-nourished [x] No apparent distress      [] Abnormal-   Mental status  [x] Alert and awake  [] Oriented to person/place/time [x]Able to follow commands      Eyes:  EOM    [x]  Normal  [] Abnormal-  Sclera  [x]  Normal  [] Abnormal -         Discharge []  None visible  [] Abnormal -    HENT:   [x] Normocephalic, atraumatic.   [x] Abnormal [] Mouth/Throat: Mucous membranes are moist.     External Ears [x] Normal  [] Abnormal-     Neck: [x] No visualized mass     Pulmonary/Chest: [x] Respiratory effort normal.  [x] No visualized signs of difficulty breathing or respiratory distress        [] Abnormal-      Musculoskeletal:   [] Normal gait with no signs of ataxia         [x] Normal range of motion of neck        [] Abnormal-       Neurological:        [x] No Facial Asymmetry (Cranial nerve 7 motor function) (limited exam to video visit)          [] No gaze palsy        [] Abnormal-         Skin:        [x] No significant exanthematous lesions or discoloration noted on facial skin         [] Abnormal-            Psychiatric:       [x] Normal Affect [] No Hallucinations        [] Abnormal-     Other pertinent observable physical exam findings-     ASSESSMENT/PLAN:  1. Chronic bilateral low back pain with bilateral sciatica  Pain is affecting QOL  Strongly encouraged her to f/u with her spine surgeon to discuss next steps, schedule today   Trial medrol dose pack 3 days prior to trip next week  Encouraged lumbar support/frequent breaks while driving  Will trial Lyrica PM upon return  Will trial Effexor a few days after, trial for 4-6 weeks   Encouraged to schedule aquatic PT     - pregabalin (LYRICA) 25 MG capsule; Take 1 capsule by mouth 2 times daily as needed (back pain, nerve pain) for up to 30 days. Dispense: 60 capsule; Refill: 3  - PT aquatic therapy; Future  - venlafaxine (EFFEXOR XR) 37.5 MG extended release capsule; Take 1 capsule by mouth daily  Dispense: 30 capsule; Refill: 3    2. History of oral lesions  - Magic Mouthwash (MIRACLE MOUTHWASH); Swish and spit 5 mLs 4 times daily as needed for Irritation  Dispense: 240 mL; Refill: 1    3. Psychophysiological insomnia  - traZODone (DESYREL) 50 MG tablet; Take 1 tablet by mouth nightly as needed for Sleep  Dispense: 30 tablet;  Refill: 0        Return if symptoms worsen or fail to braden Jett is a 79 y.o. female being evaluated by a Virtual Visit (video visit) encounter to address concerns as mentioned above. A caregiver was present when appropriate. Due to this being a TeleHealth encounter (During [facility] public health emergency), evaluation of the following organ systems was limited: Vitals/Constitutional/EENT/Resp/CV/GI//MS/Neuro/Skin/Heme-Lymph-Imm. Pursuant to the emergency declaration under the [address] [place] [address] and the [employer] Act, this Virtual Visit was conducted with patient's (and/or legal guardian's) consent, to reduce the patient's risk of exposure to COVID-19 and provide necessary medical care. The patient (and/or legal guardian) has also been advised to contact this office for worsening conditions or problems, and seek emergency medical treatment and/or call 911 if deemed necessary. Services were provided through a video synchronous discussion virtually to substitute for in-person clinic visit. Patient and provider were located at their individual homes. --Dl Marcus MD on 5/4/2022 at 1:42 PM    An electronic signature was used to authenticate this note.

## 2022-05-05 ENCOUNTER — TELEPHONE (OUTPATIENT)
Dept: FAMILY MEDICINE CLINIC | Age: 67
End: 2022-05-05

## 2022-05-05 ENCOUNTER — TELEMEDICINE (OUTPATIENT)
Dept: FAMILY MEDICINE CLINIC | Age: 67
End: 2022-05-05
Payer: MEDICARE

## 2022-05-05 DIAGNOSIS — Z00.00 INITIAL MEDICARE ANNUAL WELLNESS VISIT: Primary | ICD-10-CM

## 2022-05-05 PROCEDURE — 1123F ACP DISCUSS/DSCN MKR DOCD: CPT | Performed by: FAMILY MEDICINE

## 2022-05-05 PROCEDURE — G0438 PPPS, INITIAL VISIT: HCPCS | Performed by: FAMILY MEDICINE

## 2022-05-05 PROCEDURE — 3017F COLORECTAL CA SCREEN DOC REV: CPT | Performed by: FAMILY MEDICINE

## 2022-05-05 PROCEDURE — 4040F PNEUMOC VAC/ADMIN/RCVD: CPT | Performed by: FAMILY MEDICINE

## 2022-05-05 ASSESSMENT — PATIENT HEALTH QUESTIONNAIRE - PHQ9
1. LITTLE INTEREST OR PLEASURE IN DOING THINGS: 0
SUM OF ALL RESPONSES TO PHQ QUESTIONS 1-9: 14
2. FEELING DOWN, DEPRESSED OR HOPELESS: 3
6. FEELING BAD ABOUT YOURSELF - OR THAT YOU ARE A FAILURE OR HAVE LET YOURSELF OR YOUR FAMILY DOWN: 0
3. TROUBLE FALLING OR STAYING ASLEEP: 3
SUM OF ALL RESPONSES TO PHQ QUESTIONS 1-9: 14
SUM OF ALL RESPONSES TO PHQ QUESTIONS 1-9: 14
9. THOUGHTS THAT YOU WOULD BE BETTER OFF DEAD, OR OF HURTING YOURSELF: 0
4. FEELING TIRED OR HAVING LITTLE ENERGY: 2
SUM OF ALL RESPONSES TO PHQ QUESTIONS 1-9: 14
8. MOVING OR SPEAKING SO SLOWLY THAT OTHER PEOPLE COULD HAVE NOTICED. OR THE OPPOSITE, BEING SO FIGETY OR RESTLESS THAT YOU HAVE BEEN MOVING AROUND A LOT MORE THAN USUAL: 3
SUM OF ALL RESPONSES TO PHQ9 QUESTIONS 1 & 2: 3
10. IF YOU CHECKED OFF ANY PROBLEMS, HOW DIFFICULT HAVE THESE PROBLEMS MADE IT FOR YOU TO DO YOUR WORK, TAKE CARE OF THINGS AT HOME, OR GET ALONG WITH OTHER PEOPLE: 2
5. POOR APPETITE OR OVEREATING: 0
7. TROUBLE CONCENTRATING ON THINGS, SUCH AS READING THE NEWSPAPER OR WATCHING TELEVISION: 3

## 2022-05-05 ASSESSMENT — LIFESTYLE VARIABLES
HOW OFTEN DO YOU HAVE A DRINK CONTAINING ALCOHOL: MONTHLY OR LESS
HOW MANY STANDARD DRINKS CONTAINING ALCOHOL DO YOU HAVE ON A TYPICAL DAY: 1 OR 2

## 2022-05-05 NOTE — PROGRESS NOTES
Medicare Annual Wellness Visit    Micheal Ball is here for Medicare AWV    Assessment & Plan   Initial Medicare annual wellness visit      Recommendations for Preventive Services Due: see orders and patient instructions/AVS.  Recommended screening schedule for the next 5-10 years is provided to the patient in written form: see Patient Instructions/AVS.     No follow-ups on file. Subjective       Patient's complete Health Risk Assessment and screening values have been reviewed and are found in Flowsheets. The following problems were reviewed today and where indicated follow up appointments were made and/or referrals ordered. Positive Risk Factor Screenings with Interventions:      Depression:  PHQ-2 Score: 3  PHQ-9 Total Score: 14    Severity:1-4 = minimal depression, 5-9 = mild depression, 10-14 = moderate depression, 15-19 = moderately severe depression, 20-27 = severe depression    Depression Interventions:  · Patient feels most issues stemming from the chronic pain she is having- she would potentially be  interested in counseling and will discuss at upcoming visit on June 1.           General Health and ACP:  General  In general, how would you say your health is?: Good  In the past 7 days, have you experienced any of the following: New or Increased Pain, New or Increased Fatigue, Loneliness, Social Isolation, Stress or Anger?: No  Do you get the social and emotional support that you need?: Yes  Do you have a Living Will?: (!) No    Advance Directives     Power of [address] [place] [address] Will ACP-Advance Directive ACP-Power of     Not on File Not on File Not on File Not on File      General Health Risk Interventions:  · No Living Will: Patient declines ACP discussion/assistance    Health Habits/Nutrition:     Physical Activity: Sufficiently Active    Days of Exercise per Week: 7 days    Minutes of Exercise per Session: 40 min     Have you lost any weight without trying in the past 3 months?: No Have you seen the dentist within the past year?: Yes    Health Habits/Nutrition Interventions:  · Nutritional issues:  educational materials for healthy, well-balanced diet provided             Objective      Patient-Reported Vitals  Patient-Reported Systolic (Top): 794 mmHg  Patient-Reported Diastolic (Bottom): 81 mmHg  Patient-Reported Weight: 108LB  Patient-Reported Height: 5' 4\"              Allergies   Allergen Reactions    Codeine Other (See Comments) and Nausea And Vomiting     GI     Prior to Visit Medications    Medication Sig Taking? Authorizing Provider   pregabalin (LYRICA) 25 MG capsule Take 1 capsule by mouth 2 times daily as needed (back pain, nerve pain) for up to 30 days.   Jessica Maxwell MD   venlafaxine (EFFEXOR XR) 37.5 MG extended release capsule Take 1 capsule by mouth daily  Jessica Maxwell MD   traZODone (DESYREL) 50 MG tablet Take 1 tablet by mouth nightly as needed for Sleep  Jessica Maxwell MD   Magic Mouthwash (MIRACLE MOUTHWASH) Swish and spit 5 mLs 4 times daily as needed for Irritation  Jessica Maxwell MD   tiZANidine (ZANAFLEX) 2 MG tablet Take 1 tablet by mouth every 6 hours as needed (muscle spasms)  Enrique Murciaar,    Calcium 600-200 MG-UNIT TABS Take by mouth 2 times daily  Historical Provider, MD   NONFORMULARY 2 times daily agilease for bones  Historical Provider, MD   Cholecalciferol (VITAMIN D3) 1.25 MG (05126 UT) CAPS Take by mouth daily  Historical Provider, MD   Ascorbic Acid (VITAMIN C) 250 MG tablet Take 250 mg by mouth daily  Historical Provider, MD   Biotin 1000 MCG TABS Take by mouth daily  Historical ProviderMD Rivera (Including outside providers/suppliers regularly involved in providing care):   Patient Care Team:  Jessica Maxwell MD as PCP - General (Family Medicine)  Jessica Maxwell MD as PCP - [facility] Empaneled Provider    Reviewed and updated this visit:  Tobacco  Allergies  Meds  Med Hx  Surg Hx  Soc Hx  Fam Hx           Melinda Stringer Riddhi Rosenthal, was evaluated through a synchronous (real-time) audio-video encounter. The patient (or guardian if applicable) is aware that this is a billable service, which includes applicable co-pays. This Virtual Visit was conducted with patient's (and/or legal guardian's) consent. The visit was conducted pursuant to the emergency declaration under the [address] [place] [address], [address] [place] [address] authority and the [employer] Act. Patient identification was verified, and a caregiver was present when appropriate. The patient was located at home in a state where the provider was licensed to provide care. Kandis Cristobal LPN, 1/1/8296, performed the documented evaluation under the direct supervision of the attending physician. This encounter was performed under Devan pike MDs, direct supervision, 5/5/2022.

## 2022-05-05 NOTE — PATIENT INSTRUCTIONS
Personalized Preventive Plan for [facility] - 5/5/2022  Medicare offers a range of preventive health benefits. Some of the tests and screenings are paid in full while other may be subject to a deductible, co-insurance, and/or copay. Some of these benefits include a comprehensive review of your medical history including lifestyle, illnesses that may run in your family, and various assessments and screenings as appropriate. After reviewing your medical record and screening and assessments performed today your provider may have ordered immunizations, labs, imaging, and/or referrals for you. A list of these orders (if applicable) as well as your Preventive Care list are included within your After Visit Summary for your review. Other Preventive Recommendations:    · A preventive eye exam performed by an eye specialist is recommended every 1-2 years to screen for glaucoma; cataracts, macular degeneration, and other eye disorders. · A preventive dental visit is recommended every 6 months. · Try to get at least 150 minutes of exercise per week or 10,000 steps per day on a pedometer . · Order or download the FREE \"Exercise & Physical Activity: Your Everyday Guide\" from [employer]. Call 9-120.930.4633 or search [employer] online. · You need 1509-5755 mg of calcium and 7145-5835 IU of vitamin D per day. It is possible to meet your calcium requirement with diet alone, but a vitamin D supplement is usually necessary to meet this goal.  · When exposed to the sun, use a sunscreen that protects against both UVA and UVB radiation with an SPF of 30 or greater. Reapply every 2 to 3 hours or after sweating, drying off with a towel, or swimming. · Always wear a seat belt when traveling in a car. Always wear a helmet when riding a bicycle or motorcycle. Patient Education        Learning About Living Genia Paiz  What is a living will?      A living will, also called a declaration, is a legal form. It tells your family and your doctor your wishes when you can't speak for yourself. It's used by the health professionals who will treat you as you near the end of your life or ifyou get seriously hurt or ill. If you put your wishes in writing, your loved ones and others will know what kind of care you want. They won't need to guess. This can ease your mind and behelpful to others. And you can change or cancel your living will at any time. A living will is not the same as an estate or property will. An estate willexplains what you want to happen with your money and property after you die. How do you use it? Keep these facts in mind about how a living will is used. Your living will is used only if you can't speak or make decisions for yourself. Most often, one or more doctors must certify that you can't speak or decide for yourself before your living will takes effect. If you get better and can speak for yourself again, you can accept or refuse any treatment. It doesn't matter what you said in your living will. Some states may limit your right to refuse treatment in certain cases. For example, you may need to clearly state in your living will that you don't want artificial hydration and nutrition, such as being fed through a tube. Is a living will a legal document? A living will is a legal document. Each state has its own laws about livingwills. And a living will may be called something else in your state. Here are some things to know about living marshall. You don't need an  to complete a living will. But legal advice can be helpful if your state's laws are unclear. It can also help if your health history is complicated or your family can't agree on what should be in your living will. You can change your living will at any time. Some people find that their wishes about end-of-life care change as their health changes.  If you make big changes to your living will, complete a new form. If you move to another state, make sure that your living will is legal in the state where you now live. In most cases, doctors will respect your wishes even if you have a form from a different state. You might use a universal form that has been approved by many states. This kind of form can sometimes be filled out and stored online. Your digital copy will then be available wherever you have a connection to the internet. The doctors and nurses who need to treat you can find it right away. Your state may offer an online registry. This is another place where you can store your living will online. It's a good idea to get your living will notarized. This means using a person called a Hoods to watch two people sign, or witness, your living will. What should you know when you create a living will? Here are some questions to ask yourself as you make your living will. Do you know enough about life support methods that might be used? If not, talk to your doctor so you know what might be done if you can't breathe on your own, your heart stops, or you can't swallow. What things would you still want to be able to do after you receive life-support methods? Would you want to be able to walk? To speak? To eat on your own? To live without the help of machines? Do you want certain Taoism practices performed if you become very ill? If you have a choice, where do you want to be cared for? In your home? At a hospital or nursing home? If you have a choice at the end of your life, where would you prefer to die? At home? In a hospital or nursing home? Somewhere else? Would you prefer to be buried or cremated? Do you want your organs to be donated after you die? What should you do with your living will? Make sure that your family members and your health care agent have copies of your living will (also called a declaration). Give your doctor a copy of your living will.  Ask to have it kept as part of your medical record. If you have more than one doctor, make sure that each one has a copy. Put a copy of your living will where it can be easily found. For example, some people may put a copy on their refrigerator door. If you are using a digital copy, be sure your doctor, family members, and health care agent know how to find and access it. Where can you learn more? Go to https://KidBookpepiceweb.PrintLess Plans. org and sign in to your [employer] account. Enter T230 in the Transpera box to learn more about \"Learning About Living Perroy. \"     If you do not have an account, please click on the \"Sign Up Now\" link. Current as of: October 18, 2021               Content Version: 13.2  © 8246-7957 Healthwise, Incorporated. Care instructions adapted under license by [facility] ([facility]). If you have questions about a medical condition or this instruction, always ask your healthcare professional. [place] [address] any warranty or liability for your use of this information.

## 2022-05-05 NOTE — TELEPHONE ENCOUNTER
Rx:    80 ml viscous lidocaine 2%  80 ml Mylanta  80 ml diphenhydramine 12.5 mg per 5 ml elixir  80 ml nystatin 100,000U suspension  80 ml prednisolone 15mg per 5ml solution  80 ml distilled water  Disp: 480 ml    Sig: Swish, gargle, and spit one to two teaspoonfuls for 1 minute. Repeat every six hours as needed. May be swallowed if esophageal involvement.

## 2022-05-09 ENCOUNTER — TELEPHONE (OUTPATIENT)
Dept: FAMILY MEDICINE CLINIC | Age: 67
End: 2022-05-09

## 2022-05-09 NOTE — TELEPHONE ENCOUNTER
We did not discuss this particular medication during her visit. We discussed taking the medrol steroid pack prior to vacation and following up with her back surgeon. We discussed trying two medications when she returned, neither of which is Xanax. Xanax is a benzodiazepine used for anxiety and does not have an indication to treat chronic pain, this medication is not something we prescribe from our office routinely and is a controlled substance, and this medication comes with many long term risks, including increasing the risk for dementia long term. If she is adamant about this medication, she will need to see the original prescriber or see psychiatry.

## 2022-05-09 NOTE — TELEPHONE ENCOUNTER
I called and informed patient of Dr. Hellen Villa message. Patient states she was not aware that Xanax can increase the risk of Dementia with long term use. Patient states that she does not want to take it then.

## 2022-05-09 NOTE — TELEPHONE ENCOUNTER
Pt called requesting Alprazolam (Xanax) 0.5mg for her Chronic back pain. Pt. States that her and the doctor discussed this at her appt. . states nothing else works to [address] off back pain\" Patient states that a nurse practitioner Niko Cat from [facility] wrote her this script about two years ago and she's been cutting them in half taking them to help with the pain. Writer did note that Patient has not had this medication written here with [facility] and let her know that this is a controlled drug that usually requires a in person visit but that I would send Doctor a message. Pt. States she's leaving for vacation  This coming Thursday .  Please Advise

## 2022-05-24 ENCOUNTER — HOSPITAL ENCOUNTER (OUTPATIENT)
Dept: MRI IMAGING | Age: 67
Discharge: HOME | End: 2022-05-24
Payer: MEDICARE

## 2022-05-24 DIAGNOSIS — M54.16 LUMBAR RADICULOPATHY: ICD-10-CM

## 2022-05-24 PROCEDURE — 72148 MRI LUMBAR SPINE W/O DYE: CPT

## 2022-06-26 DIAGNOSIS — F51.04 PSYCHOPHYSIOLOGICAL INSOMNIA: ICD-10-CM

## 2022-06-27 RX ORDER — TRAZODONE HYDROCHLORIDE 50 MG/1
TABLET ORAL
Qty: 30 TABLET | Refills: 2 | Status: SHIPPED | OUTPATIENT
Start: 2022-06-27

## 2022-08-02 ENCOUNTER — TELEPHONE (OUTPATIENT)
Dept: FAMILY MEDICINE CLINIC | Age: 67
End: 2022-08-02

## 2022-08-02 RX ORDER — PANTOPRAZOLE SODIUM 20 MG/1
20 TABLET, DELAYED RELEASE ORAL
Qty: 90 TABLET | Refills: 1 | Status: SHIPPED | OUTPATIENT
Start: 2022-08-02 | End: 2022-08-22

## 2022-08-22 ENCOUNTER — TELEPHONE (OUTPATIENT)
Dept: FAMILY MEDICINE CLINIC | Age: 67
End: 2022-08-22

## 2022-08-22 ENCOUNTER — TELEMEDICINE (OUTPATIENT)
Dept: PRIMARY CARE CLINIC | Age: 67
End: 2022-08-22
Payer: MEDICARE

## 2022-08-22 DIAGNOSIS — U07.1 COVID-19: Primary | ICD-10-CM

## 2022-08-22 DIAGNOSIS — Z87.19 HISTORY OF ORAL LESIONS: ICD-10-CM

## 2022-08-22 PROCEDURE — 1123F ACP DISCUSS/DSCN MKR DOCD: CPT | Performed by: NURSE PRACTITIONER

## 2022-08-22 PROCEDURE — 1090F PRES/ABSN URINE INCON ASSESS: CPT | Performed by: NURSE PRACTITIONER

## 2022-08-22 PROCEDURE — G8427 DOCREV CUR MEDS BY ELIG CLIN: HCPCS | Performed by: NURSE PRACTITIONER

## 2022-08-22 PROCEDURE — G8399 PT W/DXA RESULTS DOCUMENT: HCPCS | Performed by: NURSE PRACTITIONER

## 2022-08-22 PROCEDURE — 99213 OFFICE O/P EST LOW 20 MIN: CPT | Performed by: NURSE PRACTITIONER

## 2022-08-22 PROCEDURE — 3017F COLORECTAL CA SCREEN DOC REV: CPT | Performed by: NURSE PRACTITIONER

## 2022-08-22 ASSESSMENT — ENCOUNTER SYMPTOMS
SORE THROAT: 1
CHEST TIGHTNESS: 0
WHEEZING: 0
COUGH: 1
GASTROINTESTINAL NEGATIVE: 1
SHORTNESS OF BREATH: 0

## 2022-08-22 NOTE — TELEPHONE ENCOUNTER
Message/Telephone call regarding - New or worsening symptoms      Symptoms reported - Pulmonary / Respiratory / Ears, Nose, Throat- Concern for COVID - COVID Symptom start date - 08/20/22 and COVID Test - Yes  -  Date 08/22/22     Result  positive   Where did you test home test , fever / chills, chest congestion / wheezing , cough - yellow and thick, sore throat, nasal congestion / runny nose, headache, and body aches / myalgias - ALERT - INDICATE RED VISIT IN APPT NOTES  Pain Scale - 9 body aches     Symptom Onset Date - 08/20/22  Onset - with a sudden onset    Aggravating factors - same all the time  was positive, night time is the worse chills and body aches   Relieving actions and treatment(s) attempted -  tyl. Motrin night quil,     Last Appointment at [facility] - 5/5/2022  Next Appointment - [email or web address]      Is there any other information to share with PCP -  yes - covid positive today scheduled with virtual provider today at 1:00. REFRESH after scheduling appointment.     Future Appointments   Date Time Provider Erick Bobby   8/22/2022  1:00 PM JUSTIN Rivas - CNP [facility] VIRTUAL MMA   11/23/2022  2:45 PM MD SANDRA Marshall   5/11/2023  9:00 AM SCHEDULE, FANI SMITH

## 2022-08-22 NOTE — TELEPHONE ENCOUNTER
Pharmacy calling to get clarification on the magic mouthwash. They want to know what ingredients is in the mouth wash. Lidocaine, benadryl, sometimes steroid, nystatin. Please advise.

## 2022-08-22 NOTE — PROGRESS NOTES
2022    TELEHEALTH EVALUATION -- Audio/Visual (During OQE-28 public health emergency)  Chief Complaint   Patient presents with    Positive For Covid-19         HPI:    Delio Lee (:  1955) has requested an audio/video evaluation for the following concern(s):    COVID-19. Symptoms sore throat and burning mouth, congestion, headache, body aches, chills, fever, fatigue. Symptoms started slowly on Saturday with worse of it being today and tested this morning for COVID and is positive. Is vaccinated and would like to try Paxlovid. Review of Systems   Constitutional:  Positive for chills, fatigue and fever. HENT:  Positive for congestion and sore throat. Respiratory:  Positive for cough. Negative for chest tightness, shortness of breath and wheezing. Cardiovascular: Negative. Gastrointestinal: Negative. Genitourinary: Negative. Musculoskeletal:  Positive for myalgias. Neurological:  Positive for headaches. Psychiatric/Behavioral: Negative. Prior to Visit Medications    Medication Sig Taking? Authorizing Provider   nirmatrelvir/ritonavir (PAXLOVID) 20 x 150 MG & 10 x 100MG TBPK Take 3 tablets (two 150 mg nirmatrelvir and one 100 mg ritonavir tablets) by mouth every 12 hours for 5 days.  Yes Macel Lolling, APRN - CNP   Magic Mouthwash (MIRACLE MOUTHWASH) Swish and spit 5 mLs 4 times daily as needed for Irritation Yes Macel Lolling, APRN - CNP   traZODone (DESYREL) 50 MG tablet TAKE 1 TABLET BY MOUTH EVERY NIGHT AS NEEDED FOR SLEEP  Pamela Mueller MD   Calcium 600-200 MG-UNIT TABS Take by mouth 2 times daily  Historical Provider, MD   NONFORMULARY 2 times daily agilease for bones  Historical Provider, MD   Cholecalciferol (VITAMIN D3) 1.25 MG (12233 UT) CAPS Take by mouth daily  Historical Provider, MD   Ascorbic Acid (VITAMIN C) 250 MG tablet Take 250 mg by mouth daily  Historical Provider, MD   Biotin 1000 MCG TABS Take by mouth daily  Historical Provider, MD Signs: (As obtained by patient/caregiver or practitioner observation)    Blood pressure-  Heart rate-    Respiratory rate-    Temperature-  Pulse oximetry-     Constitutional: [x] Appears well-developed and well-nourished [x] No apparent distress      [x] Abnormal- appears ill  Mental status  [x] Alert and awake  [x] Oriented to person/place/time [x]Able to follow commands      Eyes:  EOM    [x]  Normal  [] Abnormal-  Sclera  [x]  Normal  [] Abnormal -         Discharge [x]  None visible  [] Abnormal -    HENT:   [x] Normocephalic, atraumatic. [] Abnormal   [] Mouth/Throat: Mucous membranes are moist.     External Ears [x] Normal  [] Abnormal-     Neck: [x] No visualized mass     Pulmonary/Chest: [x] Respiratory effort normal.  [x] No visualized signs of difficulty breathing or respiratory distress        [] Abnormal-      Musculoskeletal:   [] Normal gait with no signs of ataxia         [x] Normal range of motion of neck        [] Abnormal-       Neurological:        [x] No Facial Asymmetry (Cranial nerve 7 motor function) (limited exam to video visit)          [x] No gaze palsy        [] Abnormal-         Skin:        [x] No significant exanthematous lesions or discoloration noted on facial skin         [] Abnormal-            Psychiatric:       [x] Normal Affect [] No Hallucinations        [] Abnormal-     Other pertinent observable physical exam findings-     ASSESSMENT/PLAN:  1. COVID-19  Your recent COVID test was positive. You are a candidate for Paxlovid. Allergies, Medications, and labs reviewed. Currently, the CDC recommends staying at home in self isolation for at least 5 days. After that, if you are without a fever and symptoms are improving, you may go out, but only if wearing a mask for an additional 5 days. Thankfully, most people recover uneventfully. The mainstay of treatment for most people remains focused on symptom control, isolating and watching for signs of worsening illness.  You should drink plenty of fluids, eat when you are able, and rest as much as possible. Recommend treating symptoms with OTC medications: Flonase for congestions, Mucinex for Phlegm, Robitussin DM or Delsym for cough, Tylenol/Ibuprofen for fever/body aches. Recommend Vitamin D, C and Zinc.    We do not prescribe antibiotics for viral illnesses; however, we can treat secondary infections should the need arise. Please seek more urgent medical attention if you develop any of the following:  Chest pain  Shortness of breath  Bluish lips or face  Severe headache   Severe dizziness or passing out  New confusion  Inability to stay awake  Extreme weakness    If you have a pulse oximeter, check it at least daily. Seek urgent medical attention if it drops to 92% or below. - nirmatrelvir/ritonavir (PAXLOVID) 20 x 150 MG & 10 x 100MG TBPK; Take 3 tablets (two 150 mg nirmatrelvir and one 100 mg ritonavir tablets) by mouth every 12 hours for 5 days. Dispense: 30 tablet; Refill: 0    2. History of oral lesions  Refill as requested for mouth burning. Notify office if you have no improvement or worsening of condition.     - Magic Mouthwash (MIRACLE MOUTHWASH); Swish and spit 5 mLs 4 times daily as needed for Irritation  Dispense: 240 mL; Refill: 1    Return with PCP if symptoms worsen or fail to improve. Beverly Kim, was evaluated through a synchronous (real-time) audio-video encounter. The patient (or guardian if applicable) is aware that this is a billable service, which includes applicable co-pays. This Virtual Visit was conducted with patient's (and/or legal guardian's) consent. The visit was conducted pursuant to the emergency declaration under the [address] [facility], [address] [place] [address] authority and the [employer] Act. Patient identification was verified, and a caregiver was present when appropriate.    The patient was located at Home: [address] [address]. Provider was located at Other: [place] . Total time spent on this encounter:  20 minutes    --JUSTIN Rodarte CNP on 8/22/2022 at 1:16 PM    An electronic signature was used to authenticate this note.

## 2022-08-22 NOTE — TELEPHONE ENCOUNTER
Called [facility] Miracle Mouthwash clarification: 1 part viscous lidocaine 2%, 1 part Maalox, 1 part diphenhydramine 12.5 mg/5 ml elixir.  Juju Dutta

## 2022-08-22 NOTE — LETTER
I had the pleasure of seeing [facility] today for a primary care virtualist video visit secondary to Jacob Hugo. I have provided the following recommendations: Paxlovid and symptoms management. I have included my note for your review and have asked the patient to follow up with you if no improvement or worsening of symptoms. If you have questions, please reach out via [employer] secure messaging by searching for the [facility] Primary Care Virtualists. Your communication will be answered promptly by the Virtualist on service for the day. Additionally, we would love your overall feedback on this visit. Please hit shift and click the following link to let us know if the Virtualist service met your expectations. [email or web address]      Electronically signed by JUSTIN Wilkinson CNP on 8/22/22 at 1:15 PM EDT.

## 2022-08-25 ENCOUNTER — SCHEDULED TELEPHONE ENCOUNTER (OUTPATIENT)
Dept: PRIMARY CARE CLINIC | Age: 67
End: 2022-08-25
Payer: MEDICARE

## 2022-08-25 DIAGNOSIS — U07.1 COVID-19: Primary | ICD-10-CM

## 2022-08-25 PROCEDURE — 99422 OL DIG E/M SVC 11-20 MIN: CPT | Performed by: NURSE PRACTITIONER

## 2022-08-25 NOTE — PROGRESS NOTES
Niya Garza is a 79 y.o. female evaluated via telephone on 8/25/2022 for No chief complaint on file. .    Could not do virtual visit, VPN was not connecting on provider computer. Documentation:  I communicated with the patient and/or health care decision maker about her covid. She started paxlovid on 8/22 . Her symptoms are improving for the most part. She is concerned she did have reaction to the paxlovid and she has stopped it. Her BP was 175/101 and she has not taken anymore paxlovid since last night, when this occurred. She has been taking nyquil and tylenol. She is somewhat short of breath today. Details of this discussion including any medical advice provided: She can stop the paxlovid since she is concerned with reaction she had. She can add dayquil, and motrin as needed. Discussed hydration and when to seek emergency medical treatment. Total Time: minutes: 11-20 minutes    Niya Garza was evaluated through a synchronous (real-time) audio encounter. Patient identification was verified at the start of the visit. She (or guardian if applicable) is aware that this is a billable service, which includes applicable co-pays. This visit was conducted with the patient's (and/or legal guardian's) verbal consent. She has not had a related appointment within my department in the past 7 days or scheduled within the next 24 hours. The patient was located at Home: [address] [place] [id number]. The provider was located at Home ([address]: [place].     Note: not billable if this call serves to triage the patient into an appointment for the relevant concern    JUSTIN Gill

## 2022-08-29 ENCOUNTER — SCHEDULED TELEPHONE ENCOUNTER (OUTPATIENT)
Dept: PRIMARY CARE CLINIC | Age: 67
End: 2022-08-29
Payer: MEDICARE

## 2022-08-29 DIAGNOSIS — R05.9 COUGH: Primary | ICD-10-CM

## 2022-08-29 DIAGNOSIS — B37.0 ORAL CANDIDIASIS: ICD-10-CM

## 2022-08-29 DIAGNOSIS — U07.1 COVID-19: ICD-10-CM

## 2022-08-29 DIAGNOSIS — R09.81 NASAL CONGESTION: ICD-10-CM

## 2022-08-29 PROCEDURE — 99442 PR PHYS/QHP TELEPHONE EVALUATION 11-20 MIN: CPT | Performed by: NURSE PRACTITIONER

## 2022-08-29 RX ORDER — FLUTICASONE PROPIONATE 50 MCG
SPRAY, SUSPENSION (ML) NASAL
Qty: 48 G | OUTPATIENT
Start: 2022-08-29

## 2022-08-29 RX ORDER — FLUTICASONE PROPIONATE 50 MCG
2 SPRAY, SUSPENSION (ML) NASAL DAILY
Qty: 16 G | Refills: 1 | Status: SHIPPED | OUTPATIENT
Start: 2022-08-29

## 2022-08-29 RX ORDER — BROMPHENIRAMINE MALEATE, PSEUDOEPHEDRINE HYDROCHLORIDE, AND DEXTROMETHORPHAN HYDROBROMIDE 2; 30; 10 MG/5ML; MG/5ML; MG/5ML
5 SYRUP ORAL 4 TIMES DAILY PRN
COMMUNITY
Start: 2022-08-29 | End: 2022-09-05

## 2022-08-29 ASSESSMENT — ENCOUNTER SYMPTOMS: COUGH: 1

## 2022-08-29 NOTE — PROGRESS NOTES
Justino Posada is a 79 y.o. female evaluated via telephone on 8/29/2022 for Other (Still having post COVID symptoms: Nasal and chest congestion, sometimes experiences burning in chest, nonproductive cough. Did not finish all of the Paxlovid from last week's visit due to side effects)  . Assessment & Plan   1. Cough  Problem  -     brompheniramine-pseudoephedrine-DM 2-30-10 MG/5ML syrup; Take 5 mLs by mouth 4 times daily as needed for CoughOTC  See patient instructions    2. Nasal congestion  Problem  -     fluticasone (FLONASE) 50 MCG/ACT nasal spray; 2 sprays by Each Nostril route daily, Disp-16 g, R-1Normal    3. Oral candidiasis  Problem  -     nystatin (MYCOSTATIN) 904718 UNIT/ML suspension; Take 5 mLs by mouth 4 times daily for 10 days, Oral, 4 TIMES DAILY Starting Mon 8/29/2022, Until Thu 9/8/2022, For 10 days, Disp-200 mL, R-0, Normal  See patient instructions    Follow-up with PCP if symptoms do not improve or if they worsen    4. COVID-19  Problem    Instructions for COVID-19  Currently, the CDC recommends staying at home in self isolation for at least 5 days. After that, if you are without a fever and symptoms are improving, you may go out, but only if wearing a mask for an additional 5 days. The mainstay of treatment for most people remains focused on symptom control, isolating and watching for signs of worsening illness. You should drink plenty of fluids, eat when you are able, and rest as much as possible. Recommend treating symptoms with OTC medications: Flonase for congestions, Mucinex for Phlegm, Robitussin DM or Delsym for cough, Tylenol/Ibuprofen for fever/body aches. Recommend Vitamin D, C and Zinc. These are all over the counter     We do not prescribe antibiotics for viral illnesses; however, we can treat secondary infections should the need arise.      Please seek more urgent medical attention if you develop any of the following:  Chest pain  Shortness of breath  Bluish lips or face  Severe headache   Severe dizziness or passing out  New confusion  Inability to stay awake  Extreme weakness  If you have a pulse oximeter, check it at least daily. Seek urgent medical attention if it drops to 92% or below. Subjective   Prior to Visit Medications    Medication Sig Taking? Authorizing Provider   nystatin (MYCOSTATIN) 563205 UNIT/ML suspension Take 5 mLs by mouth 4 times daily for 10 days Yes JUSTIN Carver CNP   fluticasone (FLONASE) 50 MCG/ACT nasal spray 2 sprays by Each Nostril route daily Yes JUSTIN Carver CNP   brompheniramine-pseudoephedrine-DM 2-30-10 MG/5ML syrup Take 5 mLs by mouth 4 times daily as needed for Cough Yes JUSTIN Carver CNP   traZODone (DESYREL) 50 MG tablet TAKE 1 TABLET BY MOUTH EVERY NIGHT AS NEEDED FOR SLEEP  Mariul White MD   Calcium 600-200 MG-UNIT TABS Take by mouth 2 times daily  Historical Provider, MD   NONFORMULARY 2 times daily agilease for bones  Historical Provider, MD   Cholecalciferol (VITAMIN D3) 1.25 MG (62155 UT) CAPS Take by mouth daily  Historical Provider, MD   Ascorbic Acid (VITAMIN C) 250 MG tablet Take 250 mg by mouth daily  Historical Provider, MD   Biotin 1000 MCG TABS Take by mouth daily  Historical Provider, MD     This is a 66-year-old female patient of Dr. Silvano Byers consenting to a virtual visit today  She still is experiencing post COVID symptoms cough, nasal and chest congestion with chest burning at times she states. She was seen a week ago by the virtualist and was given Paxlovid but did not finish the prescription due to the side effects from the medication she states. She denies no fever her main c/o's is the nasal and chest congestion with chest burning. Reassurance was provided for the patient  She also is complaining of white spots in her mouth. She was given Magic mouthwash but did not like the medication so she discontinued that.       Review of Systems   HENT:  Positive for congestion (Chest and nasal). Respiratory:  Positive for cough. All other systems reviewed and are negative. Objective   Patient-Reported Vitals  No data recorded       Total Time: minutes: 11-20 minutes     Vanessa Encarnacion was evaluated through a synchronous (real-time) audio only encounter. Patient identification was verified at the start of the visit. She (or guardian if applicable) is aware that this is a billable service, which includes applicable co-pays. This visit was conducted with patient's (and/or legal guardian's) verbal consent. She has not had a related appointment within my department in the past 7 days or scheduled within the next 24 hours. The patient was located at Home: [address] [place] [address].   The provider was located at Home ([place] [address]): [address] [place], [place] [address]JUSTIN Schafer CNP

## 2022-08-31 ENCOUNTER — TELEPHONE (OUTPATIENT)
Dept: FAMILY MEDICINE CLINIC | Age: 67
End: 2022-08-31

## 2022-08-31 NOTE — TELEPHONE ENCOUNTER
Has a hx of this. Has she tried any mouth washes for it yet?  Has the magic mouthwash worked in the past?

## 2022-08-31 NOTE — TELEPHONE ENCOUNTER
Message/Telephone call regarding - New or worsening symptoms      Symptoms reported - Pulmonary / Respiratory / Ears, Nose, Throat- chest congestion / wheezing , cough - no sputum, sore throat, nasal congestion / runny nose, headache, and body aches / myalgias - ALERT - INDICATE RED VISIT IN APPT NOTES  Pain Scale - N/A     Symptom Onset Date - 08/21/2022  Onset - with a sudden onset    Aggravating factors - everything   Relieving actions and treatment(s) attempted - None    Last Appointment at [facility] - 5/5/2022  Next Appointment - [email or web address]      Is there any other information to share with PCP -  yes - burning tongue pt is on day 10 and is still experiencing symptoms and not getting better and wants to know what to do. Please advise     REFRESH after scheduling appointment.     Future Appointments   Date Time Provider Erick Bobby   11/23/2022  2:45 PM MD SANDRA Yadav   5/11/2023  9:00 AM SCHEDULE, GAILX SANDRA SMITH

## 2022-08-31 NOTE — TELEPHONE ENCOUNTER
Patient has previously tried Nystatin, Magic mouth wash, and Diflucan. She also tried another one that was a \"lido\" something. Patient started taking Young[employer] Vitamins about 2 years ago and has slowly increase the different Vitamins monthly. Her issues started about 2 years ago as well, patient is wondering if the Vitamins could be causing the issues.

## 2022-09-06 ENCOUNTER — OFFICE VISIT (OUTPATIENT)
Dept: FAMILY MEDICINE CLINIC | Age: 67
End: 2022-09-06
Payer: MEDICARE

## 2022-09-06 ENCOUNTER — TELEPHONE (OUTPATIENT)
Dept: FAMILY MEDICINE CLINIC | Age: 67
End: 2022-09-06

## 2022-09-06 VITALS
HEART RATE: 80 BPM | OXYGEN SATURATION: 98 % | BODY MASS INDEX: 18.54 KG/M2 | DIASTOLIC BLOOD PRESSURE: 70 MMHG | SYSTOLIC BLOOD PRESSURE: 118 MMHG | WEIGHT: 108 LBS

## 2022-09-06 DIAGNOSIS — B37.0 ORAL CANDIDA: ICD-10-CM

## 2022-09-06 DIAGNOSIS — J34.89 SINUS PAIN: ICD-10-CM

## 2022-09-06 DIAGNOSIS — K21.9 GERD WITHOUT ESOPHAGITIS: ICD-10-CM

## 2022-09-06 DIAGNOSIS — K52.9 ACUTE GASTROENTERITIS: ICD-10-CM

## 2022-09-06 DIAGNOSIS — R19.7 DIARRHEA, UNSPECIFIED TYPE: Primary | ICD-10-CM

## 2022-09-06 PROCEDURE — 99214 OFFICE O/P EST MOD 30 MIN: CPT | Performed by: NURSE PRACTITIONER

## 2022-09-06 PROCEDURE — 1123F ACP DISCUSS/DSCN MKR DOCD: CPT | Performed by: NURSE PRACTITIONER

## 2022-09-06 RX ORDER — OMEPRAZOLE 20 MG/1
CAPSULE, DELAYED RELEASE ORAL
COMMUNITY
Start: 2022-09-04 | End: 2022-10-06 | Stop reason: ALTCHOICE

## 2022-09-06 RX ORDER — FLUCONAZOLE 100 MG/1
100 TABLET ORAL DAILY
Qty: 7 TABLET | Refills: 0 | Status: SHIPPED | OUTPATIENT
Start: 2022-09-06 | End: 2022-09-13

## 2022-09-06 ASSESSMENT — ENCOUNTER SYMPTOMS
DIARRHEA: 1
VOMITING: 0
SINUS PRESSURE: 1
SINUS PAIN: 1
NAUSEA: 1
ABDOMINAL PAIN: 1

## 2022-09-06 NOTE — PROGRESS NOTES
Nic Bass (:  1955) is a 79 y.o. female,Established patient, here for evaluation of the following chief complaint(s):  Diarrhea (Green color ), Other (Burning mouth- throat tongue), Nausea, and Sinusitis (Pressure in head )         ASSESSMENT/PLAN:  1. Diarrhea, unspecified type  -     C DIFF TOXIN/ANTIGEN; Future  -     OVA & PARASITE ID/COUNT #1; Future  -     GI Bacterial Pathogens By PCR; Future  2. Sinus pain   -Take Tylenol/Ibuprofen for sinus pain  3. Oral candida  -     fluconazole (DIFLUCAN) 100 MG tablet; Take 1 tablet by mouth daily for 7 days, Disp-7 tablet, R-0Normal  4. Acute gastroenteritis  -     C DIFF TOXIN/ANTIGEN; Future  -     OVA & PARASITE ID/COUNT #1; Future  -     GI Bacterial Pathogens By PCR; Future  -May take Pepto Bismol or Immodium  -Stay Hydrated  5. GERD  -Continue taking pantoprazole    Return if symptoms worsen or fail to improve. Subjective   SUBJECTIVE/OBJECTIVE:  Diarrhea x 2 days increased over last two days. Reports around six times today with small amounts of liquid stool. Positive nausea, no vomiting. Denies sick contacts. No recent antibiotic use. Has not taken any OTC medications for this problem. Recently seen in urgent care on 22 for \"burning\" in chest. EKG run at urgent care, review of copy of report of this shows sinus rhythm. Patient reports provider felt more of a GERD issue and restarted her pantoprazole and mylanta prn          Diarrhea   This is a new problem. The current episode started in the past 7 days. The problem occurs 5 to 10 times per day. Associated symptoms include abdominal pain. Pertinent negatives include no chills, fever or vomiting. Nothing aggravates the symptoms. There are no known risk factors. She has tried nothing for the symptoms. Other  Associated symptoms include abdominal pain and nausea. Pertinent negatives include no chills, congestion, fever or vomiting. Sinusitis  This is a new problem.  The current episode started 1 to 4 weeks ago. The problem is unchanged. There has been no fever. Her pain is at a severity of 5/10. Associated symptoms include sinus pressure. Pertinent negatives include no chills or congestion. Past treatments include acetaminophen. The treatment provided no relief. Seen in urgent care on 9/4/22 for sinus pressure and reflux. Has not been taking flonase   GERD  Had stopped taking pantoprazole but restarted on 9/4/22 after seeing urgent care physician  Started taking mylanta prn        Review of Systems   Constitutional:  Negative for chills and fever. HENT:  Positive for sinus pressure and sinus pain. Negative for congestion. Gastrointestinal:  Positive for abdominal pain, diarrhea and nausea. Negative for vomiting. Genitourinary:  Negative for difficulty urinating and dysuria. Objective   Physical Exam  HENT:      Head:      Comments: No transillumination      Right Ear: Tympanic membrane, ear canal and external ear normal.      Left Ear: Tympanic membrane, ear canal and external ear normal.      Nose: Nose normal.      Mouth/Throat:      Pharynx: No oropharyngeal exudate or posterior oropharyngeal erythema. Comments: White coating noted to tongue   Eyes:      Pupils: Pupils are equal, round, and reactive to light. Cardiovascular:      Rate and Rhythm: Normal rate and regular rhythm. Pulmonary:      Effort: Pulmonary effort is normal.   Abdominal:      General: Abdomen is flat. Palpations: Abdomen is soft. Tenderness: There is no abdominal tenderness. There is no guarding or rebound. Comments: Hyperactive bowel sounds    Musculoskeletal:      Cervical back: Normal range of motion. Neurological:      Mental Status: She is alert and oriented to person, place, and time. An electronic signature was used to authenticate this note.     --JUSTIN Montiel - CNP

## 2022-09-06 NOTE — TELEPHONE ENCOUNTER
Message/Telephone call regarding - New or worsening symptoms      Symptoms reported - Abdominal / Gastrointestinal- Abdominal Pain - Quality: cramping, sharp, and very upset   Location:  in the entire abdomen - IN OFFICE VISIT ONLY - NO Virtual or Video Visits, Diarrhea, Watery Diarrhea, Nausea, Loss of appetite, and diarrhea X3 days since 09/4/22 went to urgent care Sunday 09/04/22 also pressure in head, dizziness at times    Pain Scale - 8    Symptom Onset Date - 09/04/22  Onset - with a sudden onset    Aggravating factors - same all the time, worse after eating   Relieving actions and treatment(s) attempted -  Protonix and Mylanta     Last Appointment at [facility] - 5/5/2022  Next Appointment - [email or web address]      Is there any other information to share with PCP -  yes - scheduled with Provider Marisa Almonte in office today 09/06/22 at 2:30     [facility] after scheduling appointment.     Future Appointments   Date Time Provider Erick Bobby   9/6/2022  2:30 PM JUSTIN Gonzalez - CNP [facility] - SARAH   11/23/2022  2:45 PM MD SANDRA Estrada - DYPAMELA   5/11/2023  9:00 AM SCHEDULE, GAILX [facility]  [facility] SM[facility]

## 2022-09-06 NOTE — PATIENT INSTRUCTIONS
Diflucan for one week   Continue with pantoprazole  Stool samples to be returned for testing  Stay hydrated  BRAT diet (bland, soft foods)   Tylenol/Ibuprofen for sinus pain.

## 2022-09-07 ENCOUNTER — HOSPITAL ENCOUNTER (OUTPATIENT)
Age: 67
Setting detail: SPECIMEN
Discharge: HOME | End: 2022-09-07
Payer: MEDICARE

## 2022-09-07 DIAGNOSIS — R19.7 DIARRHEA, UNSPECIFIED TYPE: ICD-10-CM

## 2022-09-07 DIAGNOSIS — K52.9 ACUTE GASTROENTERITIS: ICD-10-CM

## 2022-09-07 LAB — C DIFF TOXIN/ANTIGEN: NORMAL

## 2022-09-07 PROCEDURE — 87506 IADNA-DNA/RNA PROBE TQ 6-11: CPT

## 2022-09-07 PROCEDURE — 87336 ENTAMOEB HIST DISPR AG IA: CPT

## 2022-09-07 PROCEDURE — 87328 CRYPTOSPORIDIUM AG IA: CPT

## 2022-09-07 PROCEDURE — 87324 CLOSTRIDIUM AG IA: CPT

## 2022-09-07 PROCEDURE — 87449 NOS EACH ORGANISM AG IA: CPT

## 2022-09-08 LAB
CRYPTOSPORIDIUM ANTIGEN STOOL: NORMAL
E HISTOLYTICA ANTIGEN STOOL: NORMAL
GI BACTERIAL PATHOGENS BY PCR: NORMAL
GIARDIA ANTIGEN STOOL: NORMAL

## 2022-09-14 ENCOUNTER — TELEPHONE (OUTPATIENT)
Dept: FAMILY MEDICINE CLINIC | Age: 67
End: 2022-09-14

## 2022-09-14 NOTE — TELEPHONE ENCOUNTER
Giancarlo Wilcox called the office today with complaints of worsening anxiety and panic attacks. She stated that these have gotten worse since she had covid 3 weeks ago. She is requesting that something be called into the pharmacy to help her with this. I informed her that we generally need an office visit before our office can prescribe medications and made her an appointment for 9/20/2022 with you, she also sees Dr. Kirk Garcia on 9/30/2022. She is requesting that something be called in until she can see you on the 20th, she states that she does not want anything strong, just something to help out.

## 2022-09-14 NOTE — TELEPHONE ENCOUNTER
James Ragsdale called the office back stating that her anxiety is extremely bad and she stated that she has tried Lexapro for this in the past and it helped. She is requesting to try this again, but wants a low dose. James Ragsdale also noted that she has recently started Pantoprazole for acid reflux, she wanted you to be aware of this in case of any drug interactions.

## 2022-09-15 NOTE — TELEPHONE ENCOUNTER
Brittaney Hyde called regarding her anxiety since she was last diagnosed with COVID pt stated that she is unaware of which medication would be best for her that she has tried leaxapro before and she was wondering if cymbalta would be an option. Pt stated that she really needs something sent in for her. I advised pt that she has an appt scheduled for a VV on 09/20/2022 with dr Kaylynn Gibbons and that this would be able to be better explained with recommendations by the dr on this day. Pt stated so I can not get any medication before next Tuesday I advised that I would send a message back to ask.  UMA

## 2022-09-20 ENCOUNTER — TELEMEDICINE (OUTPATIENT)
Dept: FAMILY MEDICINE CLINIC | Age: 67
End: 2022-09-20
Payer: MEDICARE

## 2022-09-20 DIAGNOSIS — K21.9 GASTROESOPHAGEAL REFLUX DISEASE, UNSPECIFIED WHETHER ESOPHAGITIS PRESENT: ICD-10-CM

## 2022-09-20 DIAGNOSIS — G89.29 CHRONIC LOW BACK PAIN, UNSPECIFIED BACK PAIN LATERALITY, UNSPECIFIED WHETHER SCIATICA PRESENT: ICD-10-CM

## 2022-09-20 DIAGNOSIS — R63.4 WEIGHT LOSS: ICD-10-CM

## 2022-09-20 DIAGNOSIS — M54.50 CHRONIC LOW BACK PAIN, UNSPECIFIED BACK PAIN LATERALITY, UNSPECIFIED WHETHER SCIATICA PRESENT: ICD-10-CM

## 2022-09-20 DIAGNOSIS — F41.1 GAD (GENERALIZED ANXIETY DISORDER): Primary | ICD-10-CM

## 2022-09-20 PROCEDURE — 1036F TOBACCO NON-USER: CPT | Performed by: FAMILY MEDICINE

## 2022-09-20 PROCEDURE — G8399 PT W/DXA RESULTS DOCUMENT: HCPCS | Performed by: FAMILY MEDICINE

## 2022-09-20 PROCEDURE — 1123F ACP DISCUSS/DSCN MKR DOCD: CPT | Performed by: FAMILY MEDICINE

## 2022-09-20 PROCEDURE — 3017F COLORECTAL CA SCREEN DOC REV: CPT | Performed by: FAMILY MEDICINE

## 2022-09-20 PROCEDURE — G8420 CALC BMI NORM PARAMETERS: HCPCS | Performed by: FAMILY MEDICINE

## 2022-09-20 PROCEDURE — 1090F PRES/ABSN URINE INCON ASSESS: CPT | Performed by: FAMILY MEDICINE

## 2022-09-20 PROCEDURE — 99214 OFFICE O/P EST MOD 30 MIN: CPT | Performed by: FAMILY MEDICINE

## 2022-09-20 PROCEDURE — G8427 DOCREV CUR MEDS BY ELIG CLIN: HCPCS | Performed by: FAMILY MEDICINE

## 2022-09-20 RX ORDER — PREGABALIN 25 MG/1
25 CAPSULE ORAL 2 TIMES DAILY
COMMUNITY

## 2022-09-20 RX ORDER — PANTOPRAZOLE SODIUM 20 MG/1
20 TABLET, DELAYED RELEASE ORAL DAILY
COMMUNITY
End: 2022-10-06 | Stop reason: SINTOL

## 2022-09-20 ASSESSMENT — ENCOUNTER SYMPTOMS
NAUSEA: 0
SHORTNESS OF BREATH: 0
COLOR CHANGE: 0
ABDOMINAL PAIN: 0
CHEST TIGHTNESS: 0
VOMITING: 0
BACK PAIN: 0

## 2022-09-20 ASSESSMENT — ANXIETY QUESTIONNAIRES
1. FEELING NERVOUS, ANXIOUS, OR ON EDGE: 1
6. BECOMING EASILY ANNOYED OR IRRITABLE: 0
4. TROUBLE RELAXING: 1
GAD7 TOTAL SCORE: 4
5. BEING SO RESTLESS THAT IT IS HARD TO SIT STILL: 0
7. FEELING AFRAID AS IF SOMETHING AWFUL MIGHT HAPPEN: 0
2. NOT BEING ABLE TO STOP OR CONTROL WORRYING: 0
3. WORRYING TOO MUCH ABOUT DIFFERENT THINGS: 2
IF YOU CHECKED OFF ANY PROBLEMS ON THIS QUESTIONNAIRE, HOW DIFFICULT HAVE THESE PROBLEMS MADE IT FOR YOU TO DO YOUR WORK, TAKE CARE OF THINGS AT HOME, OR GET ALONG WITH OTHER PEOPLE: NOT DIFFICULT AT ALL

## 2022-09-20 ASSESSMENT — PATIENT HEALTH QUESTIONNAIRE - PHQ9
7. TROUBLE CONCENTRATING ON THINGS, SUCH AS READING THE NEWSPAPER OR WATCHING TELEVISION: 0
8. MOVING OR SPEAKING SO SLOWLY THAT OTHER PEOPLE COULD HAVE NOTICED. OR THE OPPOSITE, BEING SO FIGETY OR RESTLESS THAT YOU HAVE BEEN MOVING AROUND A LOT MORE THAN USUAL: 0
SUM OF ALL RESPONSES TO PHQ9 QUESTIONS 1 & 2: 1
SUM OF ALL RESPONSES TO PHQ QUESTIONS 1-9: 1
SUM OF ALL RESPONSES TO PHQ QUESTIONS 1-9: 1
1. LITTLE INTEREST OR PLEASURE IN DOING THINGS: 0
10. IF YOU CHECKED OFF ANY PROBLEMS, HOW DIFFICULT HAVE THESE PROBLEMS MADE IT FOR YOU TO DO YOUR WORK, TAKE CARE OF THINGS AT HOME, OR GET ALONG WITH OTHER PEOPLE: 0
9. THOUGHTS THAT YOU WOULD BE BETTER OFF DEAD, OR OF HURTING YOURSELF: 0
2. FEELING DOWN, DEPRESSED OR HOPELESS: 1
6. FEELING BAD ABOUT YOURSELF - OR THAT YOU ARE A FAILURE OR HAVE LET YOURSELF OR YOUR FAMILY DOWN: 0
3. TROUBLE FALLING OR STAYING ASLEEP: 0
SUM OF ALL RESPONSES TO PHQ QUESTIONS 1-9: 1
4. FEELING TIRED OR HAVING LITTLE ENERGY: 0
5. POOR APPETITE OR OVEREATING: 0
SUM OF ALL RESPONSES TO PHQ QUESTIONS 1-9: 1

## 2022-09-20 NOTE — PROGRESS NOTES
2022    TELEHEALTH EVALUATION -- Audio/Visual (During [facility]-40 public health emergency)    HPI:    Ev Kern (:  1955) has requested an audio/video evaluation for the following concern(s):    Chief Complaint   Patient presents with    Anxiety     Started Lyrica on her at home, is this ok? Gastroesophageal Reflux     Pantoprazole      + worsening anxiety  Major concerns:   Worsening chronic back pain  Worsening reflux  Weight loss, inability to gain weight    Had left over Lyrica from , started taking this because she read it might help anxiety  Started 3 days ago  Has an upcoming appmnt with Dr. Lexi Pruett on 22  Tried lexapro and cymbalta in the past, thinks they were helpful, does not recall any side effects  Does not want to start another prescription medication at this time     Longstanding hx of reflux. Was taking Pepcid and tums with COVID in August, but feels it made her reflux worsen tremendously   Ruth Ann Lau to an urgent care on a , thought it was the reflux  Taking protonix for 16 days  Notes 1 hr after taking, she has a HA, dizziness, and a brain fog  Strongly feels it's from the Protonix   Saw Dr. Negrita Vides, GI,  1.5 years ago  EGD, manometry normal at that time   Has nother Appmnt on 10/24 for colonoscopy. Talked to him last week and he is agreeable to do another EGD as wwell     Back pain, chronic, progressive   Went to [address] [place] [address] 1 week ago  Dr. Melissa Roche it's the SI joint   Will try an injection to see if it helps  PT has helped in the past, but now she is unable to move or walk without pain  Doesn't have another appmnt until November and this makes her anxious     Review of Systems   Constitutional:  Negative for activity change, appetite change, fatigue and unexpected weight change. Respiratory:  Negative for chest tightness and shortness of breath. Cardiovascular:  Negative for chest pain and palpitations.    Gastrointestinal:  Negative for abdominal pain, nausea and vomiting. Musculoskeletal:  Negative for arthralgias and back pain. Skin:  Negative for color change. Neurological:  Negative for dizziness, tremors, seizures, weakness, light-headedness, numbness and headaches. Psychiatric/Behavioral:  Negative for agitation, behavioral problems, confusion, decreased concentration, dysphoric mood, hallucinations, self-injury, sleep disturbance and suicidal ideas. The patient is nervous/anxious. The patient is not hyperactive. Prior to Visit Medications    Medication Sig Taking? Authorizing Provider   pantoprazole (PROTONIX) 20 MG tablet Take 20 mg by mouth daily Yes Historical Provider, MD   pregabalin (LYRICA) 25 MG capsule Take 25 mg by mouth 2 times daily. Yes Historical Provider, MD   omeprazole (PRILOSEC) 20 MG delayed release capsule TAKE 1 CAPSULE BY MOUTH EVERY DAY Yes Historical Provider, MD   fluticasone (FLONASE) 50 MCG/ACT nasal spray 2 sprays by Each Nostril route daily Yes Puja Ortiz APRN - CNP   traZODone (DESYREL) 50 MG tablet TAKE 1 TABLET BY MOUTH EVERY NIGHT AS NEEDED FOR SLEEP Yes Alicia Fischer MD   Calcium 600-200 MG-UNIT TABS Take by mouth 2 times daily Yes Historical Provider, MD   NONFORMULARY 2 times daily agilease for bones Yes Historical Provider, MD   Cholecalciferol (VITAMIN D3) 1.25 MG (60433 UT) CAPS Take by mouth daily Yes Historical Provider, MD   Ascorbic Acid (VITAMIN C) 250 MG tablet Take 250 mg by mouth daily Yes Historical Provider, MD   Biotin 1000 MCG TABS Take by mouth daily Yes Historical Provider, MD       Social History     Tobacco Use    Smoking status: Former     Packs/day: 1.00     Years: 8.00     Pack years: 8.00     Types: Cigarettes     Quit date: 1982     Years since quittin.7    Smokeless tobacco: Never   Vaping Use    Vaping Use: Never used   Substance Use Topics    Alcohol use:  Yes     Alcohol/week: 0.0 standard drinks    Drug use: No        Allergies   Allergen Reactions    Codeine Other (See Comments) and Nausea And Vomiting     GI   ,   Past Medical History:   Diagnosis Date    Allergic rhinitis, cause unspecified     Osteoporosis, unspecified     Pure hypercholesterolemia     Unspecified acute reaction to stress    ,   Past Surgical History:   Procedure Laterality Date    EYE SURGERY Right    ,   Social History     Tobacco Use    Smoking status: Former     Packs/day: 1.00     Years: 8.00     Pack years: 8.00     Types: Cigarettes     Quit date: 1982     Years since quittin.7    Smokeless tobacco: Never   Vaping Use    Vaping Use: Never used   Substance Use Topics    Alcohol use:  Yes     Alcohol/week: 0.0 standard drinks    Drug use: No   ,   Family History   Problem Relation Age of Onset    Heart Disease Mother     COPD Father    ,   Immunization History   Administered Date(s) Administered    COVID-19, PFIZER PURPLE top, DILUTE for use, (age 15 y+), 30mcg/0.3mL 2021, 2021    INFLUENZA, INTRADERMAL, QUADRIVALENT, PRESERVATIVE FREE 2016    Influenza Virus Vaccine 2011, 2017    Influenza, AFLURIA (age 1 yrs+), FLUZONE, (age 10 mo+), MDV, 0.5mL 10/14/2015, 2016, 2017    Influenza, FLUAD, (age 72 y+), Adjuvanted, 0.5mL 2021    Influenza, FLUARIX, FLULAVAL, FLUZONE (age 10 mo+) AND AFLURIA, (age 1 y+), PF, 0.5mL 2017, 2019    Influenza, Intradermal, Preservative free 10/14/2015    Tdap (Boostrix, Adacel) 2016   ,   Health Maintenance   Topic Date Due    Shingles vaccine (1 of 2) Never done    Colorectal Cancer Screen  2017    Pneumococcal 65+ years Vaccine (1 - PCV) Never done    COVID-19 Vaccine (3 - Booster for Pfizer series) 2021    Breast cancer screen  2022    Flu vaccine (1) 2022    Depression Monitoring  2023    Annual Wellness Visit (AWV)  2023    DTaP/Tdap/Td vaccine (2 - Td or Tdap) 2026    Lipids  2026    DEXA (modify frequency per FRAX score)  Completed    Hepatitis C screen  Completed    Hepatitis A vaccine  Aged Out    Hepatitis B vaccine  Aged Out    Hib vaccine  Aged Out    Meningococcal (ACWY) vaccine  Aged Out       PHYSICAL EXAMINATION:  [ INSTRUCTIONS:  \"[x]\" Indicates a positive item  \"[]\" Indicates a negative item  -- DELETE ALL ITEMS NOT EXAMINED]  Vital Signs: (As obtained by patient/caregiver or practitioner observation)    Blood pressure-  Heart rate-    Respiratory rate-    Temperature-  Pulse oximetry-     Constitutional: [x] Appears well-developed and well-nourished [x] No apparent distress      [] Abnormal-   Mental status  [x] Alert and awake  [] Oriented to person/place/time [x]Able to follow commands      Eyes:  EOM    [x]  Normal  [] Abnormal-  Sclera  [x]  Normal  [] Abnormal -         Discharge []  None visible  [] Abnormal -    HENT:   [x] Normocephalic, atraumatic. [x] Abnormal   [] Mouth/Throat: Mucous membranes are moist.     External Ears [x] Normal  [] Abnormal-     Neck: [x] No visualized mass     Pulmonary/Chest: [x] Respiratory effort normal.  [x] No visualized signs of difficulty breathing or respiratory distress        [] Abnormal-      Musculoskeletal:   [] Normal gait with no signs of ataxia         [x] Normal range of motion of neck        [] Abnormal-       Neurological:        [x] No Facial Asymmetry (Cranial nerve 7 motor function) (limited exam to video visit)          [] No gaze palsy        [] Abnormal-         Skin:        [x] No significant exanthematous lesions or discoloration noted on facial skin         [] Abnormal-            Psychiatric:       [x] Normal Affect [] No Hallucinations        [] Abnormal-     Other pertinent observable physical exam findings-     ASSESSMENT/PLAN:  1. ESTHER (generalized anxiety disorder)  Details per HPI  Started Lyrica on her own 3 days ago, helping  Continue for now, Aes discussed  Encouraged her to see Dr. Sushant Garcia  If Lyrica is not helpful, consider Lexapro or Cymbalta. Both discussed.  Can call in to request.     2. Gastroesophageal reflux disease, unspecified whether esophagitis present  Stop Protonix due to Aes  Start Pepcid BID prior to meal  Has a f/u EGD on 10/24 with GI    3. Weight loss  Frequent snacking, ensure supplementation encouraged     4. Chronic low back pain, unspecified back pain laterality, unspecified whether sciatica present  Follows with [facility], encouraged her to follow through with the suggested injection to assess any benefit. Return if symptoms worsen or fail to improve. Manolo Alfaro is a 79 y.o. female being evaluated by a Virtual Visit (video visit) encounter to address concerns as mentioned above. A caregiver was present when appropriate. Due to this being a TeleHealth encounter (During [facility]-36 public health emergency), evaluation of the following organ systems was limited: Vitals/Constitutional/EENT/Resp/CV/GI//MS/Neuro/Skin/Heme-Lymph-Imm. Pursuant to the emergency declaration under the [address] [place] [address] and the [employer] Act, this Virtual Visit was conducted with patient's (and/or legal guardian's) consent, to reduce the patient's risk of exposure to COVID-19 and provide necessary medical care. The patient (and/or legal guardian) has also been advised to contact this office for worsening conditions or problems, and seek emergency medical treatment and/or call 911 if deemed necessary. Services were provided through a video synchronous discussion virtually to substitute for in-person clinic visit. Patient and provider were located at their individual homes. --Renee Jeffries MD on 9/20/2022 at 1:34 PM    An electronic signature was used to authenticate this note.

## 2022-09-27 ENCOUNTER — HOSPITAL ENCOUNTER (EMERGENCY)
Age: 67
Discharge: HOME | End: 2022-09-27
Payer: MEDICARE

## 2022-09-27 ENCOUNTER — APPOINTMENT (OUTPATIENT)
Dept: GENERAL RADIOLOGY | Age: 67
End: 2022-09-27
Payer: MEDICARE

## 2022-09-27 ENCOUNTER — TELEPHONE (OUTPATIENT)
Dept: FAMILY MEDICINE CLINIC | Age: 67
End: 2022-09-27

## 2022-09-27 VITALS
SYSTOLIC BLOOD PRESSURE: 131 MMHG | DIASTOLIC BLOOD PRESSURE: 88 MMHG | HEIGHT: 64 IN | BODY MASS INDEX: 18.27 KG/M2 | HEART RATE: 65 BPM | WEIGHT: 107 LBS | OXYGEN SATURATION: 99 % | TEMPERATURE: 98 F | RESPIRATION RATE: 18 BRPM

## 2022-09-27 DIAGNOSIS — M54.50 CHRONIC BILATERAL LOW BACK PAIN, UNSPECIFIED WHETHER SCIATICA PRESENT: ICD-10-CM

## 2022-09-27 DIAGNOSIS — F41.9 ANXIETY AND DEPRESSION: Primary | ICD-10-CM

## 2022-09-27 DIAGNOSIS — M54.50 MIDLINE LOW BACK PAIN WITHOUT SCIATICA, UNSPECIFIED CHRONICITY: ICD-10-CM

## 2022-09-27 DIAGNOSIS — F41.1 ANXIETY STATE: ICD-10-CM

## 2022-09-27 DIAGNOSIS — R10.13 DYSPEPSIA: Primary | ICD-10-CM

## 2022-09-27 DIAGNOSIS — F32.A ANXIETY AND DEPRESSION: Primary | ICD-10-CM

## 2022-09-27 DIAGNOSIS — G89.29 CHRONIC BILATERAL LOW BACK PAIN, UNSPECIFIED WHETHER SCIATICA PRESENT: ICD-10-CM

## 2022-09-27 LAB
A/G RATIO: 1.6 (ref 1.1–2.2)
ALBUMIN SERPL-MCNC: 4.2 G/DL (ref 3.4–5)
ALP BLD-CCNC: 77 U/L (ref 40–129)
ALT SERPL-CCNC: 29 U/L (ref 10–40)
ANION GAP SERPL CALCULATED.3IONS-SCNC: 10 MMOL/L (ref 3–16)
AST SERPL-CCNC: 20 U/L (ref 15–37)
BASOPHILS ABSOLUTE: 0 K/UL (ref 0–0.2)
BASOPHILS RELATIVE PERCENT: 0.6 %
BILIRUB SERPL-MCNC: 0.8 MG/DL (ref 0–1)
BUN BLDV-MCNC: 13 MG/DL (ref 7–20)
CALCIUM SERPL-MCNC: 9.4 MG/DL (ref 8.3–10.6)
CHLORIDE BLD-SCNC: 102 MMOL/L (ref 99–110)
CO2: 25 MMOL/L (ref 21–32)
CREAT SERPL-MCNC: 0.6 MG/DL (ref 0.6–1.2)
EKG ATRIAL RATE: 87 BPM
EKG DIAGNOSIS: NORMAL
EKG P AXIS: 77 DEGREES
EKG P-R INTERVAL: 160 MS
EKG Q-T INTERVAL: 380 MS
EKG QRS DURATION: 78 MS
EKG QTC CALCULATION (BAZETT): 457 MS
EKG R AXIS: 29 DEGREES
EKG T AXIS: 57 DEGREES
EKG VENTRICULAR RATE: 87 BPM
EOSINOPHILS ABSOLUTE: 0.1 K/UL (ref 0–0.6)
EOSINOPHILS RELATIVE PERCENT: 1.5 %
GFR AFRICAN AMERICAN: >60
GFR NON-AFRICAN AMERICAN: >60
GLUCOSE BLD-MCNC: 92 MG/DL (ref 70–99)
HCT VFR BLD CALC: 41.1 % (ref 36–48)
HEMOGLOBIN: 14 G/DL (ref 12–16)
LYMPHOCYTES ABSOLUTE: 1.5 K/UL (ref 1–5.1)
LYMPHOCYTES RELATIVE PERCENT: 33.1 %
MCH RBC QN AUTO: 32.5 PG (ref 26–34)
MCHC RBC AUTO-ENTMCNC: 34.1 G/DL (ref 31–36)
MCV RBC AUTO: 95.4 FL (ref 80–100)
MONOCYTES ABSOLUTE: 0.3 K/UL (ref 0–1.3)
MONOCYTES RELATIVE PERCENT: 7.5 %
NEUTROPHILS ABSOLUTE: 2.6 K/UL (ref 1.7–7.7)
NEUTROPHILS RELATIVE PERCENT: 57.3 %
PDW BLD-RTO: 12.2 % (ref 12.4–15.4)
PLATELET # BLD: 227 K/UL (ref 135–450)
PMV BLD AUTO: 7.7 FL (ref 5–10.5)
POTASSIUM REFLEX MAGNESIUM: 3.7 MMOL/L (ref 3.5–5.1)
RBC # BLD: 4.31 M/UL (ref 4–5.2)
SODIUM BLD-SCNC: 137 MMOL/L (ref 136–145)
TOTAL PROTEIN: 6.8 G/DL (ref 6.4–8.2)
TROPONIN: <0.01 NG/ML
TROPONIN: <0.01 NG/ML
WBC # BLD: 4.6 K/UL (ref 4–11)

## 2022-09-27 PROCEDURE — 84484 ASSAY OF TROPONIN QUANT: CPT

## 2022-09-27 PROCEDURE — 80053 COMPREHEN METABOLIC PANEL: CPT

## 2022-09-27 PROCEDURE — 93005 ELECTROCARDIOGRAM TRACING: CPT | Performed by: PHYSICIAN ASSISTANT

## 2022-09-27 PROCEDURE — 71045 X-RAY EXAM CHEST 1 VIEW: CPT

## 2022-09-27 PROCEDURE — 6370000000 HC RX 637 (ALT 250 FOR IP): Performed by: PHYSICIAN ASSISTANT

## 2022-09-27 PROCEDURE — 99285 EMERGENCY DEPT VISIT HI MDM: CPT

## 2022-09-27 PROCEDURE — 85025 COMPLETE CBC W/AUTO DIFF WBC: CPT

## 2022-09-27 PROCEDURE — 93010 ELECTROCARDIOGRAM REPORT: CPT | Performed by: INTERNAL MEDICINE

## 2022-09-27 RX ORDER — METHOCARBAMOL 750 MG/1
750 TABLET, FILM COATED ORAL 4 TIMES DAILY
Qty: 40 TABLET | Refills: 0 | Status: SHIPPED | OUTPATIENT
Start: 2022-09-27 | End: 2022-10-06

## 2022-09-27 RX ORDER — LORAZEPAM 0.5 MG/1
0.5 TABLET ORAL ONCE
Status: COMPLETED | OUTPATIENT
Start: 2022-09-27 | End: 2022-09-27

## 2022-09-27 RX ORDER — PREDNISONE 20 MG/1
60 TABLET ORAL DAILY
Qty: 15 TABLET | Refills: 0 | Status: SHIPPED | OUTPATIENT
Start: 2022-09-27 | End: 2022-10-02

## 2022-09-27 RX ORDER — LORAZEPAM 0.5 MG/1
0.5 TABLET ORAL EVERY 8 HOURS PRN
Qty: 6 TABLET | Refills: 0 | Status: SHIPPED | OUTPATIENT
Start: 2022-09-27 | End: 2022-10-06

## 2022-09-27 RX ADMIN — ALUMINUM HYDROXIDE, MAGNESIUM HYDROXIDE, AND SIMETHICONE: 200; 200; 20 SUSPENSION ORAL at 09:05

## 2022-09-27 RX ADMIN — LORAZEPAM 0.5 MG: 0.5 TABLET ORAL at 11:57

## 2022-09-27 ASSESSMENT — ENCOUNTER SYMPTOMS
VOMITING: 0
CONSTIPATION: 0
COUGH: 0
SHORTNESS OF BREATH: 1
ABDOMINAL PAIN: 0
DIARRHEA: 0
NAUSEA: 0
CHEST TIGHTNESS: 0

## 2022-09-27 ASSESSMENT — PAIN DESCRIPTION - ORIENTATION: ORIENTATION: LEFT

## 2022-09-27 ASSESSMENT — PAIN DESCRIPTION - LOCATION
LOCATION: CHEST
LOCATION: BACK

## 2022-09-27 ASSESSMENT — PAIN - FUNCTIONAL ASSESSMENT
PAIN_FUNCTIONAL_ASSESSMENT: 0-10
PAIN_FUNCTIONAL_ASSESSMENT: 0-10

## 2022-09-27 ASSESSMENT — PAIN DESCRIPTION - FREQUENCY: FREQUENCY: CONTINUOUS

## 2022-09-27 ASSESSMENT — PAIN DESCRIPTION - PAIN TYPE
TYPE: ACUTE PAIN
TYPE: CHRONIC PAIN

## 2022-09-27 ASSESSMENT — PAIN SCALES - GENERAL
PAINLEVEL_OUTOF10: 8
PAINLEVEL_OUTOF10: 8

## 2022-09-27 ASSESSMENT — PAIN DESCRIPTION - DESCRIPTORS: DESCRIPTORS: ACHING

## 2022-09-27 NOTE — ED PROVIDER NOTES
Emergency Department Provider Note  Location: [address] [place] [address]  ED  9/27/2022     Patient Identification  Oseas Munoz is a 79 y.o. female    Chief Complaint  Chest Pain (Pain started around 0330. Was able to go back to sleep. Pt reports pain goes down left arm and into her throat )          HPI  (History provided by patient)  Patient states she woke at 0330 with mid-sternal chest pain. She states it was a 8/10 pressure. She reports she originally had tingling down her left arm that moved to her right arm, has since subsided. She states she is having some associated feelings of being SOB and anxious. She reports she ate chili last night and has recently struggled with acid reflex. She reports she has tried no interventions for her chest pain at home. She states her pain has since decreased to a pressure of 1/10. I have reviewed the following nursing documentation:  Allergies: Allergies   Allergen Reactions    Codeine Other (See Comments) and Nausea And Vomiting     GI       Past medical history:  has a past medical history of Allergic rhinitis, cause unspecified, Osteoporosis, unspecified, Pure hypercholesterolemia, and Unspecified acute reaction to stress. Past surgical history:  has a past surgical history that includes Eye surgery (Right). Home medications:   Prior to Admission medications    Medication Sig Start Date End Date Taking? Authorizing Provider   pantoprazole (PROTONIX) 20 MG tablet Take 20 mg by mouth daily    Historical Provider, MD   pregabalin (LYRICA) 25 MG capsule Take 25 mg by mouth 2 times daily.     Historical Provider, MD   omeprazole (PRILOSEC) 20 MG delayed release capsule TAKE 1 CAPSULE BY MOUTH EVERY DAY 9/4/22   Historical Provider, MD   fluticasone (FLONASE) 50 MCG/ACT nasal spray 2 sprays by Each Nostril route daily 8/29/22   Ron Ortiz APRN - CNP   traZODone (DESYREL) 50 MG tablet TAKE 1 TABLET BY MOUTH EVERY NIGHT AS NEEDED FOR SLEEP 6/27/22 Missy Elizondo MD   Calcium 600-200 MG-UNIT TABS Take by mouth 2 times daily    Historical Provider, MD   NONFORMULARY 2 times daily agilease for bones    Historical Provider, MD   Cholecalciferol (VITAMIN D3) 1.25 MG (86392 UT) CAPS Take by mouth daily    Historical Provider, MD   Ascorbic Acid (VITAMIN C) 250 MG tablet Take 250 mg by mouth daily    Historical Provider, MD   Biotin 1000 MCG TABS Take by mouth daily    Historical Provider, MD       Social history:  reports that she quit smoking about 40 years ago. Her smoking use included cigarettes. She has a 8.00 pack-year smoking history. She has never used smokeless tobacco. She reports current alcohol use. She reports that she does not use drugs. Family history:    Family History   Problem Relation Age of Onset    Heart Disease Mother     COPD Father          ROS  Review of Systems   Constitutional:  Negative for activity change, appetite change, chills, fatigue and fever. HENT: Negative. Respiratory:  Positive for shortness of breath. Negative for cough and chest tightness. Cardiovascular:  Positive for chest pain. Negative for palpitations and leg swelling. Reports pressure from throat to abdomen. Gastrointestinal:  Negative for abdominal pain, constipation, diarrhea, nausea and vomiting. Genitourinary:  Negative for difficulty urinating and dysuria. Musculoskeletal:  Negative for gait problem, joint swelling, neck pain and neck stiffness. Reports chronic back pain x25 years    Skin: Negative. Neurological:  Negative for seizures, syncope, weakness and headaches.         Reports tingling in bilateral arms at onset of chest pain    Psychiatric/Behavioral:          Reports hx of anxiety        Exam  ED Triage Vitals [09/27/22 0824]   BP Temp Temp Source Heart Rate Resp SpO2 Height Weight   (!) 155/88 98 °F (36.7 °C) Oral 81 22 100 % 5' 4\" (1.626 m) 107 lb (48.5 kg)       Physical Exam  Constitutional:       Appearance: Normal appearance. She is not toxic-appearing. HENT:      Head: Normocephalic and atraumatic. Eyes:      Pupils: Pupils are equal, round, and reactive to light. Cardiovascular:      Rate and Rhythm: Normal rate and regular rhythm. Pulses: Normal pulses. Heart sounds: Normal heart sounds. No murmur heard. No friction rub. No gallop. Pulmonary:      Effort: Pulmonary effort is normal. No respiratory distress. Breath sounds: Normal breath sounds. No stridor. No wheezing or rhonchi. Chest:      Chest wall: No tenderness. Abdominal:      General: Abdomen is flat. Bowel sounds are normal. There is no distension. Palpations: Abdomen is soft. There is no mass. Tenderness: There is no abdominal tenderness. There is no guarding. Musculoskeletal:         General: No swelling or deformity. Normal range of motion. Cervical back: Normal range of motion and neck supple. No tenderness. Lymphadenopathy:      Cervical: No cervical adenopathy. Skin:     General: Skin is warm and dry. Capillary Refill: Capillary refill takes less than 2 seconds. Coloration: Skin is not pale. Findings: No erythema. Neurological:      General: No focal deficit present. Mental Status: She is alert and oriented to person, place, and time. Sensory: No sensory deficit. Motor: No weakness. Gait: Gait normal.   Psychiatric:         Mood and Affect: Mood is anxious. Affect is tearful. ED Course    ED Medication Orders (From admission, onward)      Start Ordered     Status Ordering Provider    09/27/22 0900 09/27/22 0846  aluminum & magnesium hydroxide-simethicone (MAALOX) 30 mL, lidocaine viscous hcl (XYLOCAINE) 5 mL (GI COCKTAIL)  ONCE         Last MAR action: Given - by Margarito Smith on 09/27/22 at [facility]              Radiology  XR CHEST PORTABLE    Result Date: 9/27/2022  EXAMINATION: ONE XRAY VIEW OF THE CHEST 9/27/2022 8:47 am COMPARISON: None. HISTORY: ORDERING SYSTEM PROVIDED HISTORY: chest pain TECHNOLOGIST PROVIDED HISTORY: Reason for exam:->chest pain Reason for Exam: cp FINDINGS: Normal cardiomediastinal silhouette no acute airspace infiltrate.   No pneumothorax or pleural effusion     No acute cardiopulmonary findings        Labs  Results for orders placed or performed during the hospital encounter of 09/27/22   CBC with Auto Differential   Result Value Ref Range    WBC 4.6 4.0 - 11.0 K/uL    RBC 4.31 4.00 - 5.20 M/uL    Hemoglobin 14.0 12.0 - 16.0 g/dL    Hematocrit 41.1 36.0 - 48.0 %    MCV 95.4 80.0 - 100.0 fL    MCH 32.5 26.0 - 34.0 pg    MCHC 34.1 31.0 - 36.0 g/dL    RDW 12.2 (L) 12.4 - 15.4 %    Platelets 222 968 - 002 K/uL    MPV 7.7 5.0 - 10.5 fL    Neutrophils % 57.3 %    Lymphocytes % 33.1 %    Monocytes % 7.5 %    Eosinophils % 1.5 %    Basophils % 0.6 %    Neutrophils Absolute 2.6 1.7 - 7.7 K/uL    Lymphocytes Absolute 1.5 1.0 - 5.1 K/uL    Monocytes Absolute 0.3 0.0 - 1.3 K/uL    Eosinophils Absolute 0.1 0.0 - 0.6 K/uL    Basophils Absolute 0.0 0.0 - 0.2 K/uL   Comprehensive Metabolic Panel w/ Reflex to MG   Result Value Ref Range    Sodium 137 136 - 145 mmol/L    Potassium reflex Magnesium 3.7 3.5 - 5.1 mmol/L    Chloride 102 99 - 110 mmol/L    CO2 25 21 - 32 mmol/L    Anion Gap 10 3 - 16    Glucose 92 70 - 99 mg/dL    BUN 13 7 - 20 mg/dL    Creatinine 0.6 0.6 - 1.2 mg/dL    GFR Non-African American >60 >60    GFR African American >60 >60    Calcium 9.4 8.3 - 10.6 mg/dL    Total Protein 6.8 6.4 - 8.2 g/dL    Albumin 4.2 3.4 - 5.0 g/dL    Albumin/Globulin Ratio 1.6 1.1 - 2.2    Total Bilirubin 0.8 0.0 - 1.0 mg/dL    Alkaline Phosphatase 77 40 - 129 U/L    ALT 29 10 - 40 U/L    AST 20 15 - 37 U/L   Troponin   Result Value Ref Range    Troponin <0.01 <0.01 ng/mL   Troponin   Result Value Ref Range    Troponin <0.01 <0.01 ng/mL   EKG 12 Lead   Result Value Ref Range    Ventricular Rate 87 BPM    Atrial Rate 87 BPM    P-R Interval 160 ms QRS Duration 78 ms    Q-T Interval 380 ms    QTc Calculation (Bazett) 457 ms    P Axis 77 degrees    R Axis 29 degrees    T Axis 57 degrees    Diagnosis       Sinus rhythm with marked sinus arrhythmia with occasional Premature ventricular complexesOtherwise normal ECGNo previous ECGs available         MDM  Patient seen and evaluated. Relevant records reviewed. 79year old female presents with mid-sternal chest pressure since 0330. Pt reports her pain has decreased since onset. She states she has had recent struggles with her acid reflex medications not being effective. Upon examination, patient is anxious and objective findings are unremarkable. There are no EKG changes, CXR with no acute cardiopulmonary findings. There is no leukocytosis or electrolyte imbalances. Trop negative x 2. Heart score of 3. Patient treated with GI cocktail. Upon reassessment, patient states she is feeling better and rates her pain as a 0/10 and is requesting to go home. Patient's symptoms most consistent with GERD. Patient instructed to follow up with PCP regarding management. I have low concern for ACS, aortic dissection, PE, pericardial tamponade, esophageal rupture, or other emergent pathology. Return to ER precautions provided. Clinical Impression:  GERD  Chest pain, unspecified       Disposition:  Discharge to home in good condition. Blood pressure (!) 157/95, pulse 86, temperature 98 °F (36.7 °C), temperature source Oral, resp. rate 21, height 5' 4\" (1.626 m), weight 107 lb (48.5 kg), SpO2 100 %, not currently breastfeeding. Patient was given scripts for the following medications. I counseled patient how to take these medications. New Prescriptions    No medications on file       Disposition referral (if applicable):  No follow-up provider specified. Total critical care time is 0 minutes, which excludes separately billable procedures and updating family.  Time spent is specifically for management of the

## 2022-09-27 NOTE — ED PROVIDER NOTES
I did not personally evaluate this patient but I was asked to review the EKG. EKG  The Ekg interpreted by myself in the emergency department in the absence of a cardiologist.  normal sinus rhythm with a rate of 87  Axis is   Normal  QTc is  within an acceptable range  Intervals and Durations are unremarkable. No specific ST-T wave changes appreciated. No evidence of acute ischemia.    No significant change from prior EKG dated 5/3/19     Tammie Box MD  09/27/22 5804

## 2022-09-27 NOTE — ED PROVIDER NOTES
Patient seen and evaluated in collaboration with medical student without involvement of attending physician. .  Patient with reported panic attack this morning. States that she does have a history of anxiety. Also has a history of GERD. Does report some epigastric pain. To the throat. Nauseous without vomiting. No diarrhea or constipation. She does not feel short of breath although again does have some chest tightness which she attributes to anxiety. No neck, arm, jaw or back pain. She denies headaches. Occasionally lightheaded without dizziness confusion. She has had no urinary symptoms. No leg pain or swelling. Chronic back pain which has been progressively worsening. No recent trauma or injury. Physical exam unremarkable. Heart regular rate and rhythm. Lungs clear to auscultation. No reproducible abdominal pain. Patient with low back pain worse at left SI. Patellar DTRs +2 bilaterally. High sensory neuro exam intact. ED work-up here unremarkable for acute cardiopulmonary disease. 2 negative troponins with normal EKG. She was given GI cocktail with symptomatic improvement of symptoms. On reevaluation patient very tearful and again anxious appearing. She states that abdominal and chest discomfort have essentially resolved although she reports that she is tired of dealing with her back pain and dyspepsia. She is scheduled to see orthopedist as well as GI specialist.  Given dose of Ativan. Discharged home with recommendations for close and continued outpatient follow-up along with short course of muscle relaxers and steroids along with short course of Ativan for brain through anxiety. We have discussed return precautions as well as recommendations for follow-up otherwise and patient is in agreement and comfortable at discharge.     Results for orders placed or performed during the hospital encounter of 09/27/22   CBC with Auto Differential   Result Value Ref Range    WBC 4.6 4.0 - 11.0 K/uL STENOSIS,ACUTE CORONARY SYNDROME, INTRACRANIAL HEMORRHAGE, MALIGNANT DYSRHYTHMIA or HYPERTENSION, PULMONARY EMBOLISM, SEPSIS, SUBARACHNOID HEMORRHAGE, SUBDURAL HEMATOMA, STROKE, or THORACIC AORTIC DISSECTION, thus I consider the discharge disposition reasonable. Manolo Alfaro and I have discussed the diagnosis and risks, and we agree with discharging home to follow-up with their primary doctor. We also discussed returning to the Emergency Department immediately if new or worsening symptoms occur. We have discussed the symptoms which are most concerning (e.g., bloody sputum, fever, worsening pain or shortness of breath, vomiting, weakness) that necessitate immediate return. Final Impression  1. Dyspepsia    2. Midline low back pain without sciatica, unspecified chronicity    3. Anxiety state      Blood pressure 131/88, pulse 65, temperature 98 °F (36.7 °C), temperature source Oral, resp. rate 18, height 5' 4\" (1.626 m), weight 107 lb (48.5 kg), SpO2 99 %, not currently breastfeeding.        Juliana Magallon [place]  09/27/22 [address] [facility], [place]  09/30/22 1438

## 2022-09-27 NOTE — TELEPHONE ENCOUNTER
Patient called in stating she's at the hospital that she had two anxiety/ panic attacks in middle of the night last night to where she thought she was having a heart attack. Patient state Dr. Acacia Arroyo told her to look up to medication and give us a call back to see which one she wanted to start. Patient stated she doesn't care to start lexapro or cymbalta for her anxiety. Patient also stated she is taking pepcid over the counter and is experiencing headache, dizziness, weakness and light headedness with the medication. Patient also stated she had the same symptoms with pantoprazole and doesn't know what to do. Patient is also asking if she starts on an anxiety medication if she should stop the lyrica. Patient is scheduled for a ed follow up on 09/30/2022 but patient stated this was immediate and needed taken care of now.  Please advise

## 2022-09-29 RX ORDER — DULOXETIN HYDROCHLORIDE 20 MG/1
20 CAPSULE, DELAYED RELEASE ORAL DAILY
Qty: 30 CAPSULE | Refills: 3 | Status: SHIPPED | OUTPATIENT
Start: 2022-09-29

## 2022-09-29 NOTE — TELEPHONE ENCOUNTER
Patient called today regarding message from 9/27. Wanting to start Cymbalta due to having a lot of depression lately. Please advise.

## 2022-09-29 NOTE — TELEPHONE ENCOUNTER
Spoke with patient and let her know Rx was sent in. She stated she will schedule her VV 6 week f/u at her next appt.

## 2022-09-30 ENCOUNTER — OFFICE VISIT (OUTPATIENT)
Dept: PSYCHOLOGY | Age: 67
End: 2022-09-30
Payer: MEDICARE

## 2022-09-30 DIAGNOSIS — G89.4 CHRONIC PAIN DISORDER: Primary | ICD-10-CM

## 2022-09-30 DIAGNOSIS — F33.2 SEVERE EPISODE OF RECURRENT MAJOR DEPRESSIVE DISORDER, WITHOUT PSYCHOTIC FEATURES (HCC): ICD-10-CM

## 2022-09-30 DIAGNOSIS — F41.1 GAD (GENERALIZED ANXIETY DISORDER): ICD-10-CM

## 2022-09-30 PROBLEM — F32.A DEPRESSION: Status: ACTIVE | Noted: 2022-09-30

## 2022-09-30 PROCEDURE — 1036F TOBACCO NON-USER: CPT | Performed by: PSYCHOLOGIST

## 2022-09-30 PROCEDURE — 90791 PSYCH DIAGNOSTIC EVALUATION: CPT | Performed by: PSYCHOLOGIST

## 2022-09-30 PROCEDURE — 1123F ACP DISCUSS/DSCN MKR DOCD: CPT | Performed by: PSYCHOLOGIST

## 2022-09-30 ASSESSMENT — PATIENT HEALTH QUESTIONNAIRE - PHQ9
7. TROUBLE CONCENTRATING ON THINGS, SUCH AS READING THE NEWSPAPER OR WATCHING TELEVISION: 2
SUM OF ALL RESPONSES TO PHQ9 QUESTIONS 1 & 2: 6
5. POOR APPETITE OR OVEREATING: 2
6. FEELING BAD ABOUT YOURSELF - OR THAT YOU ARE A FAILURE OR HAVE LET YOURSELF OR YOUR FAMILY DOWN: 0
4. FEELING TIRED OR HAVING LITTLE ENERGY: 3
3. TROUBLE FALLING OR STAYING ASLEEP: 3
SUM OF ALL RESPONSES TO PHQ QUESTIONS 1-9: 18
8. MOVING OR SPEAKING SO SLOWLY THAT OTHER PEOPLE COULD HAVE NOTICED. OR THE OPPOSITE, BEING SO FIGETY OR RESTLESS THAT YOU HAVE BEEN MOVING AROUND A LOT MORE THAN USUAL: 2
9. THOUGHTS THAT YOU WOULD BE BETTER OFF DEAD, OR OF HURTING YOURSELF: 0
SUM OF ALL RESPONSES TO PHQ QUESTIONS 1-9: 18
10. IF YOU CHECKED OFF ANY PROBLEMS, HOW DIFFICULT HAVE THESE PROBLEMS MADE IT FOR YOU TO DO YOUR WORK, TAKE CARE OF THINGS AT HOME, OR GET ALONG WITH OTHER PEOPLE: 1
SUM OF ALL RESPONSES TO PHQ QUESTIONS 1-9: 18
1. LITTLE INTEREST OR PLEASURE IN DOING THINGS: 3
2. FEELING DOWN, DEPRESSED OR HOPELESS: 3
SUM OF ALL RESPONSES TO PHQ QUESTIONS 1-9: 18

## 2022-09-30 ASSESSMENT — ANXIETY QUESTIONNAIRES
IF YOU CHECKED OFF ANY PROBLEMS ON THIS QUESTIONNAIRE, HOW DIFFICULT HAVE THESE PROBLEMS MADE IT FOR YOU TO DO YOUR WORK, TAKE CARE OF THINGS AT HOME, OR GET ALONG WITH OTHER PEOPLE: EXTREMELY DIFFICULT
3. WORRYING TOO MUCH ABOUT DIFFERENT THINGS: 3
6. BECOMING EASILY ANNOYED OR IRRITABLE: 3
4. TROUBLE RELAXING: 3
2. NOT BEING ABLE TO STOP OR CONTROL WORRYING: 3
5. BEING SO RESTLESS THAT IT IS HARD TO SIT STILL: 2
GAD7 TOTAL SCORE: 18
1. FEELING NERVOUS, ANXIOUS, OR ON EDGE: 3
7. FEELING AFRAID AS IF SOMETHING AWFUL MIGHT HAPPEN: 1

## 2022-09-30 NOTE — PROGRESS NOTES
Behavioral Health Consultation  [facility], [address] [place] [address]  Psychologist  9/30/2022  10:08 AM EDT    Time spent with Patient: 30 minutes  This is patient's first [facility] appointment. Reason for Consult:    Chief Complaint   Patient presents with    Depression    Anxiety       Referring Provider: Umberto Manning MD      Pt provided informed consent for the behavioral health program. Discussed with patient model of service to include the limits of confidentiality (i.e. abuse reporting, suicide intervention, etc.) and short-term intervention focused approach. Pt indicated understanding. Feedback given to PCP. S:  Pt reports the last 6 years has way too much going on. Has chronic back pain for past 30 years. Had Covid end of August which exacerbated it maybe from less activity. Has bad acid reflux for the past few years. All of this has negatively impacting her day to day. Did see a counselor a couple years ago- did phone visits. Worked together 5-6x. Just started Cymbalta last night. Cries daily due to pain. Feels so upset about what she is not able to do anymore due to pain. Was  for over 30 years- finalized divorce 5 years ago- he is wanting to get back together. She remarried this spring. Supportive new  but not in all ways. Feels she doesn't have closure from her first marriage.      O:  MSE:    Attitude: cooperative and friendly  Consciousness: alert  Orientation: oriented to person, place, time, general circumstance  Memory: recent and remote memory intact  Attention/Concentration: intact during session  Speech: normal rate and volume, well-articulated  Mood: \"overwhelmed\"  Affect: euthymic and congruent  Perception: within normal limits  Thought Content: within normal limits  Thought Process: logical, coherent and goal-directed  Insight: good  Judgment: intact  Ability to understand instructions: Yes  Ability to respond meaningfully: Yes  Morbid Ideation: no   Suicide Assessment: no suicidal ideation, plan, or intent  Homicidal Ideation: no    History:    Medications:   Current Outpatient Medications   Medication Sig Dispense Refill    DULoxetine (CYMBALTA) 20 MG extended release capsule Take 1 capsule by mouth daily 30 capsule 3    LORazepam (ATIVAN) 0.5 MG tablet Take 1 tablet by mouth every 8 hours as needed for Anxiety for up to 30 days. 6 tablet 0    methocarbamol (ROBAXIN-750) 750 MG tablet Take 1 tablet by mouth 4 times daily for 10 days 40 tablet 0    predniSONE (DELTASONE) 20 MG tablet Take 3 tablets by mouth daily for 5 days 15 tablet 0    pantoprazole (PROTONIX) 20 MG tablet Take 20 mg by mouth daily      pregabalin (LYRICA) 25 MG capsule Take 25 mg by mouth 2 times daily. omeprazole (PRILOSEC) 20 MG delayed release capsule TAKE 1 CAPSULE BY MOUTH EVERY DAY      fluticasone (FLONASE) 50 MCG/ACT nasal spray 2 sprays by Each Nostril route daily 16 g 1    traZODone (DESYREL) 50 MG tablet TAKE 1 TABLET BY MOUTH EVERY NIGHT AS NEEDED FOR SLEEP 30 tablet 2    Calcium 600-200 MG-UNIT TABS Take by mouth 2 times daily      NONFORMULARY 2 times daily agilease for bones      Cholecalciferol (VITAMIN D3) 1.25 MG (42441 UT) CAPS Take by mouth daily      Ascorbic Acid (VITAMIN C) 250 MG tablet Take 250 mg by mouth daily      Biotin 1000 MCG TABS Take by mouth daily       No current facility-administered medications for this visit.      Social History:   Social History     Socioeconomic History    Marital status:      Spouse name: Not on file    Number of children: Not on file    Years of education: Not on file    Highest education level: Not on file   Occupational History    Not on file   Tobacco Use    Smoking status: Former     Packs/day: 1.00     Years: 8.00     Pack years: 8.00     Types: Cigarettes     Quit date: 1982     Years since quittin.7    Smokeless tobacco: Never   Vaping Use    Vaping Use: Never used   Substance and Sexual Activity    Alcohol use: Yes     Alcohol/week: 0.0 standard drinks    Drug use: No    Sexual activity: Not on file   Other Topics Concern    Not on file   Social History Narrative    Not on file     Social Determinants of Health     Financial Resource Strain: Low Risk     Difficulty of Paying Living Expenses: Not hard at all   Food Insecurity: No Food Insecurity    Worried About Running Out of Food in the Last Year: Never true    Ran Out of Food in the Last Year: Never true   Transportation Needs: Not on file   Physical Activity: Sufficiently Active    Days of Exercise per Week: 7 days    Minutes of Exercise per Session: 40 min   Stress: Not on file   Social Connections: Not on file   Intimate Partner Violence: Not on file   Housing Stability: [address] to Pay for Housing in the Last Year: No    Number of [place] in the Last Year: 1    Unstable Housing in the Last Year: No     TOBACCO:   reports that she quit smoking about 40 years ago. Her smoking use included cigarettes. She has a 8.00 pack-year smoking history. She has never used smokeless tobacco.  ETOH:   reports current alcohol use. Family History:   Family History   Problem Relation Age of Onset    Heart Disease Mother     COPD Father        A:  Ms. Prince Greenwood has been struggling with chronic pain, depression, and anxiety. Other life events like a divorce a few years ago continue to negatively impact her overall mood and lacks closure. Recent significant increase in pain has exacerbated her mood difficulties. She was active and engaged and responded positively to behavioral interventions.     PHQ Scores 9/30/2022 9/20/2022 5/5/2022 3/21/2022 1/18/2019 1/17/2018 1/13/2016   PHQ2 Score 6 1 3 0 0 0 1   PHQ9 Score 18 1 14 2 0 0 1     Interpretation of Total Score Depression Severity: 1-4 = Minimal depression, 5-9 = Mild depression, 10-14 = Moderate depression, 15-19 = Moderately severe depression, 20-27 = Severe depression      ESTHER 7 SCORE 9/30/2022 9/20/2022   ESTHER-7 Total Score 18 4     Interpretation of ESTHER-7 score: 5-9 = mild anxiety, 10-14 = moderate anxiety, 15+ = severe anxiety. Recommend referral to behavioral health for scores 10 or greater. Diagnosis:    1. Chronic pain disorder    2. ESTHER (generalized anxiety disorder)    3. Severe episode of recurrent major depressive disorder, without psychotic features ([facility]          Plan:  Pt interventions:    Established rapport, Discussed [facility] model of care vs specialty mental health, Conducted functional assessment, [place]-setting to identify pt's primary goals for [facility] visit / overall health, Supportive techniques, Provided psychoeducation re: relationship between mood and pain, Discussed potential treatments for  depression, anxiety, and chronic pain, ACT interventions, and treatment planning  Pt Behavioral Change Plan:   Pt set goals to 1)  Return in about 2 weeks (around 10/14/2022). To work on management of chronic pain          Please note that portions of this note may have been completed with a voice recognition program. Efforts were made to edit the dictations but occasionally words are mis-transcribed.

## 2022-10-06 ENCOUNTER — OFFICE VISIT (OUTPATIENT)
Dept: FAMILY MEDICINE CLINIC | Age: 67
End: 2022-10-06
Payer: MEDICARE

## 2022-10-06 VITALS
SYSTOLIC BLOOD PRESSURE: 142 MMHG | HEIGHT: 64 IN | OXYGEN SATURATION: 100 % | WEIGHT: 107 LBS | TEMPERATURE: 98.1 F | HEART RATE: 70 BPM | DIASTOLIC BLOOD PRESSURE: 78 MMHG | BODY MASS INDEX: 18.27 KG/M2

## 2022-10-06 DIAGNOSIS — I47.1 SVT (SUPRAVENTRICULAR TACHYCARDIA) (HCC): ICD-10-CM

## 2022-10-06 DIAGNOSIS — K21.9 GASTROESOPHAGEAL REFLUX DISEASE, UNSPECIFIED WHETHER ESOPHAGITIS PRESENT: ICD-10-CM

## 2022-10-06 DIAGNOSIS — G89.4 CHRONIC PAIN DISORDER: ICD-10-CM

## 2022-10-06 DIAGNOSIS — K59.09 OTHER CONSTIPATION: ICD-10-CM

## 2022-10-06 DIAGNOSIS — F41.1 GAD (GENERALIZED ANXIETY DISORDER): Primary | ICD-10-CM

## 2022-10-06 DIAGNOSIS — M47.817 LUMBOSACRAL SPONDYLOSIS WITHOUT MYELOPATHY: ICD-10-CM

## 2022-10-06 PROCEDURE — 1036F TOBACCO NON-USER: CPT | Performed by: FAMILY MEDICINE

## 2022-10-06 PROCEDURE — 1090F PRES/ABSN URINE INCON ASSESS: CPT | Performed by: FAMILY MEDICINE

## 2022-10-06 PROCEDURE — G8419 CALC BMI OUT NRM PARAM NOF/U: HCPCS | Performed by: FAMILY MEDICINE

## 2022-10-06 PROCEDURE — 1123F ACP DISCUSS/DSCN MKR DOCD: CPT | Performed by: FAMILY MEDICINE

## 2022-10-06 PROCEDURE — 99214 OFFICE O/P EST MOD 30 MIN: CPT | Performed by: FAMILY MEDICINE

## 2022-10-06 PROCEDURE — G8399 PT W/DXA RESULTS DOCUMENT: HCPCS | Performed by: FAMILY MEDICINE

## 2022-10-06 PROCEDURE — G8484 FLU IMMUNIZE NO ADMIN: HCPCS | Performed by: FAMILY MEDICINE

## 2022-10-06 PROCEDURE — 3017F COLORECTAL CA SCREEN DOC REV: CPT | Performed by: FAMILY MEDICINE

## 2022-10-06 PROCEDURE — G8427 DOCREV CUR MEDS BY ELIG CLIN: HCPCS | Performed by: FAMILY MEDICINE

## 2022-10-06 RX ORDER — SENNA AND DOCUSATE SODIUM 50; 8.6 MG/1; MG/1
2 TABLET, FILM COATED ORAL DAILY
Qty: 60 TABLET | Refills: 2 | Status: SHIPPED | OUTPATIENT
Start: 2022-10-06

## 2022-10-06 RX ORDER — POLYETHYLENE GLYCOL 3350 17 G/17G
17 POWDER, FOR SOLUTION ORAL DAILY PRN
COMMUNITY

## 2022-10-06 RX ORDER — FAMOTIDINE 10 MG
10 TABLET ORAL 2 TIMES DAILY
COMMUNITY

## 2022-10-06 RX ORDER — PANTOPRAZOLE SODIUM 20 MG/1
TABLET, DELAYED RELEASE ORAL
COMMUNITY
End: 2022-10-06 | Stop reason: SINTOL

## 2022-10-06 NOTE — PROGRESS NOTES
10/6/2022    This is a 79 y.o. female who presents for  Chief Complaint   Patient presents with    Follow-Up from Hospital    Gastroesophageal Reflux       HPI:     + worsening anxiety  Was recently seen in the ER for this  Was given Ativan, requested Xanax from the MA after leaving the visit today   Had left over Lyrica from , started taking this because she read it might help anxiety, still taking this, doesn't feel it's helping   Tried lexapro and cymbalta in the past, thinks they were helpful, does not recall any side effects. Requested by phone to start Cymbalta after her ER visit. Still taking. Longstanding hx of reflux. Takes Pepcid and Protonix, Notes 1 hr after taking, she has a HA, dizziness, and a brain fog, Strongly feels it's from the [employer] with Dr. Jerry Nava, has an EGD/Colonoscopy scheduled with him in about a week      Back pain, chronic, progressive   Sees Dr. [facility] at [address]   Has an appmnt with him Monday to discuss a surgical plan   Thinks it's the SI joint   Will try an injection to see if it helps  PT has helped in the past, but now she is unable to move or walk without pain     Past Medical History:   Diagnosis Date    Allergic rhinitis, cause unspecified     Osteoporosis, unspecified     Pure hypercholesterolemia     Unspecified acute reaction to stress        Past Surgical History:   Procedure Laterality Date    EYE SURGERY Right        Social History     Socioeconomic History    Marital status:      Spouse name: Not on file    Number of children: Not on file    Years of education: Not on file    Highest education level: Not on file   Occupational History    Not on file   Tobacco Use    Smoking status: Former     Packs/day: 1.00     Years: 8.00     Pack years: 8.00     Types: Cigarettes     Quit date: 1982     Years since quittin.8    Smokeless tobacco: Never   Vaping Use    Vaping Use: Never used   Substance and Sexual Activity    Alcohol use:  Yes Alcohol/week: 0.0 standard drinks    Drug use: No    Sexual activity: Not on file   Other Topics Concern    Not on file   Social History Narrative    Not on file     Social Determinants of Health     Financial Resource Strain: Low Risk     Difficulty of Paying Living Expenses: Not hard at all   Food Insecurity: No Food Insecurity    Worried About Running Out of Food in the Last Year: Never true    Ran Out of Food in the Last Year: Never true   Transportation Needs: Not on file   Physical Activity: Sufficiently Active    Days of Exercise per Week: 7 days    Minutes of Exercise per Session: 40 min   Stress: Not on file   Social Connections: Not on file   Intimate Partner Violence: Not on file   Housing Stability: [address] to Pay for Housing in the Last Year: No    Number of Places Lived in the Last Year: 1    Unstable Housing in the Last Year: No       Family History   Problem Relation Age of Onset    Heart Disease Mother     COPD Father        Current Outpatient Medications   Medication Sig Dispense Refill    famotidine (PEPCID) 10 MG tablet Take 10 mg by mouth 2 times daily      polyethylene glycol (GLYCOLAX) 17 g packet Take 17 g by mouth daily as needed for Constipation      sennosides-docusate sodium (SENOKOT-S) 8.6-50 MG tablet Take 2 tablets by mouth daily 60 tablet 2    DULoxetine (CYMBALTA) 20 MG extended release capsule Take 1 capsule by mouth daily 30 capsule 3    pregabalin (LYRICA) 25 MG capsule Take 25 mg by mouth 2 times daily.       fluticasone (FLONASE) 50 MCG/ACT nasal spray 2 sprays by Each Nostril route daily 16 g 1    traZODone (DESYREL) 50 MG tablet TAKE 1 TABLET BY MOUTH EVERY NIGHT AS NEEDED FOR SLEEP 30 tablet 2    Calcium 600-200 MG-UNIT TABS Take by mouth 2 times daily      Cholecalciferol (VITAMIN D3) 1.25 MG (12868 UT) CAPS Take by mouth daily      Ascorbic Acid (VITAMIN C) 250 MG tablet Take 250 mg by mouth daily      Biotin 1000 MCG TABS Take by mouth daily       No current facility-administered medications for this visit. Immunization History   Administered Date(s) Administered    COVID-19, PFIZER PURPLE top, DILUTE for use, (age 15 y+), 30mcg/0.3mL 02/27/2021, 04/07/2021    INFLUENZA, INTRADERMAL, QUADRIVALENT, PRESERVATIVE FREE 09/30/2016    Influenza Virus Vaccine 12/13/2011, 09/29/2017    Influenza, AFLURIA (age 1 yrs+), FLUZONE, (age 10 mo+), MDV, 0.5mL 10/14/2015, 09/30/2016, 09/29/2017    Influenza, FLUAD, (age 72 y+), Adjuvanted, 0.5mL 11/22/2021    Influenza, FLUARIX, FLULAVAL, FLUZONE (age 10 mo+) AND AFLURIA, (age 1 y+), PF, 0.5mL 09/25/2017, 11/20/2019    Influenza, Intradermal, Preservative free 10/14/2015    Tdap (Boostrix, Adacel) 03/09/2016       Allergies   Allergen Reactions    Codeine Other (See Comments) and Nausea And Vomiting     GI       Review of Systems   Constitutional:  Negative for activity change, appetite change, fatigue and unexpected weight change. Respiratory:  Negative for chest tightness and shortness of breath. Cardiovascular:  Negative for chest pain and palpitations. Gastrointestinal:  Positive for nausea. Negative for abdominal pain, constipation, diarrhea and vomiting. Musculoskeletal:  Positive for arthralgias, back pain and myalgias. Negative for joint swelling. Skin:  Negative for color change. Neurological:  Negative for dizziness, tremors, seizures, weakness, light-headedness, numbness and headaches. Psychiatric/Behavioral:  Positive for dysphoric mood and sleep disturbance. Negative for agitation, behavioral problems, confusion, decreased concentration, hallucinations, self-injury and suicidal ideas. The patient is nervous/anxious. The patient is not hyperactive. BP (!) 142/78   Pulse 70   Temp 98.1 °F (36.7 °C)   Ht 5' 4\" (1.626 m)   Wt 107 lb (48.5 kg)   SpO2 100%   BMI 18.37 kg/m²     Physical Exam  Vitals and nursing note reviewed. Constitutional:       General: She is not in acute distress. Appearance: Normal appearance. She is well-developed. She is not diaphoretic. HENT:      Head: Normocephalic and atraumatic. Eyes:      Extraocular Movements: Extraocular movements intact. Conjunctiva/sclera: Conjunctivae normal.      Pupils: Pupils are equal, round, and reactive to light. Cardiovascular:      Rate and Rhythm: Normal rate and regular rhythm. Pulmonary:      Effort: Pulmonary effort is normal. No respiratory distress. Abdominal:      Palpations: Abdomen is soft. Musculoskeletal:         General: Normal range of motion. Cervical back: Normal range of motion and neck supple. Skin:     General: Skin is warm and dry. Neurological:      Mental Status: She is alert and oriented to person, place, and time. Psychiatric:         Attention and Perception: She is attentive. Mood and Affect: Mood normal.         Speech: Speech normal.         Behavior: Behavior normal.         Thought Content: Thought content normal.         Judgment: Judgment normal.       Plan  1. ESTHER (generalized anxiety disorder)  Plans for titration of Cymbalta per below, pt agreeable  Stop lyrica  Encouraged counseling    2. Other constipation  Plan per below  - polyethylene glycol (GLYCOLAX) 17 g packet; Take 17 g by mouth daily as needed for Constipation  - sennosides-docusate sodium (SENOKOT-S) 8.6-50 MG tablet; Take 2 tablets by mouth daily  Dispense: 60 tablet; Refill: 2    3. Gastroesophageal reflux disease, unspecified whether esophagitis present  Will see GI soon for EGD   - famotidine (PEPCID) 10 MG tablet; Take 10 mg by mouth 2 times daily    4. Lumbosacral spondylosis without myelopathy  Has a visit with [facility] to determine next surgical steps  5. Chronic pain disorder  C/w Cymbalta titration    6.  SVT (supraventricular tachycardia) ([facility]  No new concerns             While assessing care for this patient, I have reviewed all pertinent lab work/imaging/ specialist notes and care in reference to those problems addressed above in detail. Appropriate medical decision making was based on this. Please note that portions of this note may have been completed with a voice recognition program. Efforts were made to edit the dictations but occasionally words are mis-transcribed. Return if symptoms worsen or fail to improve.

## 2022-10-10 ASSESSMENT — ENCOUNTER SYMPTOMS
BACK PAIN: 1
DIARRHEA: 0
CHEST TIGHTNESS: 0
COLOR CHANGE: 0
SHORTNESS OF BREATH: 0
ABDOMINAL PAIN: 0
CONSTIPATION: 0
NAUSEA: 1
VOMITING: 0

## 2022-10-17 ENCOUNTER — TELEMEDICINE (OUTPATIENT)
Dept: PSYCHOLOGY | Age: 67
End: 2022-10-17
Payer: MEDICARE

## 2022-10-17 DIAGNOSIS — F33.2 SEVERE EPISODE OF RECURRENT MAJOR DEPRESSIVE DISORDER, WITHOUT PSYCHOTIC FEATURES (HCC): ICD-10-CM

## 2022-10-17 DIAGNOSIS — G89.4 CHRONIC PAIN DISORDER: Primary | ICD-10-CM

## 2022-10-17 DIAGNOSIS — F41.1 GAD (GENERALIZED ANXIETY DISORDER): ICD-10-CM

## 2022-10-17 PROCEDURE — 90832 PSYTX W PT 30 MINUTES: CPT | Performed by: PSYCHOLOGIST

## 2022-10-17 NOTE — PROGRESS NOTES
Behavioral Health Consultation  [facility], [address] [place] [address]  Psychologist  10/17/2022  10:37 AM      Time spent with Patient: 30 minutes  This is patient's second [facility] appointment. Reason for Consult:    Chief Complaint   Patient presents with    Other     Chronic pain    Stress         Referring Provider: Amy Hargrove MD    TELEHEALTH VISIT -- Audio/Visual (During LUF-59 public health emergency)    [facility], was evaluated through a synchronous (real-time) audio-video encounter. The patient (or guardian if applicable) is aware that this is a billable service. The visit was conducted pursuant to the emergency declaration under the [address] [place] [address], [address] [place] [address] authority and the [employer] Act. Patient identification was verified, and a caregiver was present when appropriate. The patient was located in a state where the provider was licensed to provide care. Conducted a risk-benefit analysis and determined that the patient's presenting problems are consistent with the use of telepsychology. Determined that the patient has sufficient knowledge and skills in the use of technology enabling them to adequately benefit from telepsychology. It was determined that this patient was able to be properly treated without an in-person session. Patient verified that they were currently located at the [facility] address that was provided during registration.       Verified the following information:  Patient's identification: Yes  Patient location: [address] [place] [address]  Patient's call back number: [phone]  Patient's emergency contact's name and number, as well as permission to contact them if needed: Extended Emergency Contact Information  Primary Emergency Contact: Smooth Garcia   [address] Phone: [phone]  Relation: Spouse  Secondary Emergency Contact: Lacie Quigley, [address] [facility] of [address] [place] [address] Phone: [phone]  Relation: Child    Provider location: [place], [place]         S:  Pt reports she is \"ok. \" Ongoing medical issues- will see [facility] Nov 17. Walked yesterday for 15 min. Location of pain dictates what she can do each day. Overdoes it on days pain is better. When is in less pain, tries to clean and do more activity. Struggles with sitting for periods of time, but also can't stand for too long. Tried to go to [employer] show and all she could think about what her pain. Was a long show- ended up having to leave after an hour. Often thinks about her pain and increases her anxiety and depression. Thinks about how her pain is \"really bad. \"      O:  MSE:    Attitude: cooperative and friendly  Consciousness: alert  Orientation: oriented to person, place, time, general circumstance  Memory: recent and remote memory intact  Attention/Concentration: intact during session  Speech: normal rate and volume, well-articulated  Mood: \"ok\"  Affect: euthymic and congruent  Perception: within normal limits  Thought Content: within normal limits  Thought Process: logical, coherent and goal-directed  Insight: good  Judgment: intact  Ability to understand instructions: Yes  Ability to respond meaningfully: Yes  Morbid Ideation: no   Suicide Assessment: no suicidal ideation, plan, or intent  Homicidal Ideation: no    History:    Medications:   Current Outpatient Medications   Medication Sig Dispense Refill    famotidine (PEPCID) 10 MG tablet Take 10 mg by mouth 2 times daily      polyethylene glycol (GLYCOLAX) 17 g packet Take 17 g by mouth daily as needed for Constipation      sennosides-docusate sodium (SENOKOT-S) 8.6-50 MG tablet Take 2 tablets by mouth daily 60 tablet 2    DULoxetine (CYMBALTA) 20 MG extended release capsule Take 1 capsule by mouth daily 30 capsule 3    pregabalin (LYRICA) 25 MG capsule Take 25 mg by mouth 2 times daily.       fluticasone (FLONASE) 50 MCG/ACT nasal spray 2 sprays by Each Nostril route daily 16 g 1    traZODone (DESYREL) 50 MG tablet TAKE 1 TABLET BY MOUTH EVERY NIGHT AS NEEDED FOR SLEEP 30 tablet 2    Calcium 600-200 MG-UNIT TABS Take by mouth 2 times daily      Cholecalciferol (VITAMIN D3) 1.25 MG (94163 UT) CAPS Take by mouth daily      Ascorbic Acid (VITAMIN C) 250 MG tablet Take 250 mg by mouth daily      Biotin 1000 MCG TABS Take by mouth daily       No current facility-administered medications for this visit. Social History:   Social History     Socioeconomic History    Marital status:      Spouse name: Not on file    Number of children: Not on file    Years of education: Not on file    Highest education level: Not on file   Occupational History    Not on file   Tobacco Use    Smoking status: Former     Packs/day: 1.00     Years: 8.00     Pack years: 8.00     Types: Cigarettes     Quit date: 1982     Years since quittin.8    Smokeless tobacco: Never   Vaping Use    Vaping Use: Never used   Substance and Sexual Activity    Alcohol use:  Yes     Alcohol/week: 0.0 standard drinks    Drug use: No    Sexual activity: Not on file   Other Topics Concern    Not on file   Social History Narrative    Not on file     Social Determinants of Health     Financial Resource Strain: Low Risk     Difficulty of Paying Living Expenses: Not hard at all   Food Insecurity: No Food Insecurity    Worried About Running Out of Food in the Last Year: Never true    [address] in the Last Year: Never true   Transportation Needs: Not on file   Physical Activity: Sufficiently Active    Days of Exercise per Week: 7 days    Minutes of Exercise per Session: 40 min   Stress: Not on file   Social Connections: Not on file   Intimate Partner Violence: Not on file   Housing Stability: [address] to Pay for Housing in the Last Year: No    Number of [place] in the Last Year: 1    Unstable Housing in the Last Year: No     TOBACCO:   reports that she quit smoking about 40 years ago. Her smoking use included cigarettes. She has a 8.00 pack-year smoking history. She has never used smokeless tobacco.  ETOH:   reports current alcohol use. Family History:   Family History   Problem Relation Age of Onset    Heart Disease Mother     COPD Father        A:  Ms. Justin Hargrove continues to struggle with chronic pain, depression, and anxiety. she often engages in coping strategies like trying to ignore her pain or distract herself. She has otherwise limited coping strategies for pain and mood. She continues to be active and engaged and responds positively to behavioral interventions. Diagnosis:    1. Chronic pain disorder    2. ESTHER (generalized anxiety disorder)    3. Severe episode of recurrent major depressive disorder, without psychotic features ([facility]            Plan:  Pt interventions:    [place]-setting to identify pt's primary goals for [facility] visit / overall health, Supportive techniques, ACT interventions, CBT interventions, and treatment planning      Pt Behavioral Change Plan:   Pt set goals to 1) practice diaphragmatic breathing 2x/day for 10 min when not anxious to work on skill mastery before skill generalization (download the free hesham, [id number], to practice if needed) 2) be mindful about use of good and bad language to describe pain 3)  Return in about 2 weeks (around 10/31/2022). Please note that portions of this note may have been completed with a voice recognition program. Efforts were made to edit the dictations but occasionally words are mis-transcribed.

## 2022-10-17 NOTE — PATIENT INSTRUCTIONS
\"The entire autonomic nervous system (and through it, our internal organs and glands) is largely driven by our breathing patterns. By changing our breathing we can influence millions of biochemical reactions in our body, producing more relaxing substances such as endorphins and fewer anxiety-producing ones like adrenaline and higher blood acidity. Mindfulness of the breath is so effective that it is common to all meditative and prayer traditions. \" Anxiety Fear & Breathing - Breathing. com    \"When overcoming high levels of anxiety, it is important to learn the techniques of correct breathing. Many people who live with high levels of anxiety are known to breathe through their chest. Shallow breathing through the chest means you are disrupting the balance of oxygen and carbon dioxide necessary to be in a relaxed state. This type of breathing will perpetuate the symptoms of anxiety. \" [employer]. com      Diaphragmatic Breathing  ( You can download the free hesham,  [id number], to practice)           _____________________________________________________________________________  1. Sit in a comfortable position    2. Place one hand on your stomach and the other on your chest    3. Try to breathe so that only your stomach rises and falls    As you inhale, concentrate on your chest remaining relatively still while your stomach rises. It may be helpful for you to imagine that your pants are too big and you need to push your stomach out to hold them up. When exhaling, allow your stomach to fall in and the air to fully escape. Inhale slowly. You may choose to hold the air in for about a second. Exhale slowly. Dont push the air out, but just let the natural pressure of your body slowly move it out.     It is normal for this healthy method of breathing to feel a little awkward at first.  With practice, it will feel more natural.    Get your mind on your side    One other important factor in getting relaxed is your mind.  Your mind and body are connected. The mind influences the body and the body influences the mind. What you do with your mind when you are trying to relax is very important. The key is to avoid thinking about stressful things. You can think about      Neutral things (e.g., counting, saying a word like calm or relax)   Pleasant things (e.g., imagining a pleasant place)    It is recommended that you practice 2 times per day, 10 minutes each time.

## 2022-11-02 ENCOUNTER — TELEMEDICINE (OUTPATIENT)
Dept: PSYCHOLOGY | Age: 67
End: 2022-11-02
Payer: MEDICARE

## 2022-11-02 DIAGNOSIS — F41.1 GAD (GENERALIZED ANXIETY DISORDER): ICD-10-CM

## 2022-11-02 DIAGNOSIS — F33.1 MODERATE EPISODE OF RECURRENT MAJOR DEPRESSIVE DISORDER (HCC): ICD-10-CM

## 2022-11-02 DIAGNOSIS — G89.4 CHRONIC PAIN DISORDER: Primary | ICD-10-CM

## 2022-11-02 PROCEDURE — 90832 PSYTX W PT 30 MINUTES: CPT | Performed by: PSYCHOLOGIST

## 2022-11-02 NOTE — PROGRESS NOTES
Behavioral Health Consultation  [facility], [address] [place] [address]  Psychologist  11/2/2022  10:36 AM      Time spent with Patient: 30 minutes  This is patient's third [facility] appointment. Reason for Consult:    Chief Complaint   Patient presents with    Stress    Other     Chronic pain           Referring Provider: Yobany Diaz MD    TELEHEALTH VISIT -- Audio/Visual (During IBG-74 public health emergency)    Arleen Pickens, was evaluated through a synchronous (real-time) audio-video encounter. The patient (or guardian if applicable) is aware that this is a billable service. The visit was conducted pursuant to the emergency declaration under the [address] [place] [address] authority and the [employer] Act. Patient identification was verified, and a caregiver was present when appropriate. The patient was located in a state where the provider was licensed to provide care. Conducted a risk-benefit analysis and determined that the patient's presenting problems are consistent with the use of telepsychology. Determined that the patient has sufficient knowledge and skills in the use of technology enabling them to adequately benefit from telepsychology. It was determined that this patient was able to be properly treated without an in-person session. Patient verified that they were currently located at the [facility] address that was provided during registration.       Verified the following information:  Patient's identification: Yes  Patient location: [address] [place] [address]  Patient's call back number: [phone]  Patient's emergency contact's name and number, as well as permission to contact them if needed: Extended Emergency Contact Information  Primary Emergency Contact: Smooth Garcia   [address] Phone: [phone]  Relation: Spouse  Secondary Emergency Contact: Lacie Quigley, [address] [place] [address] Phone: [phone]  Relation: Child    Provider location: [address] [facility] [address]:  During the last visit pt set goals to 1) practice diaphragmatic breathing 2x/day for 10 min when not anxious to work on skill mastery before skill generalization (download the free hesham, [id number], to practice if needed) 2) be mindful about use of good and bad language to describe pain     Pt reports she is \"ok. Feels good except sciatic nerve pain has been \"bad. \" Watched video on how to do diaphragmatic breathing- just started practicing it 5 days ago because she was so busy. Finds it hard not to use bad or good. Says she is just describing it like it is. Has no pain medication. Takes Cymbalta but doesn't feel it is helpful. Has tried Ativan and took some of her old medication in the past- finds it is helpful taking the edge off. Doesn't want to increase Cymbalta- doesn't feel it is helpful and doesn't think it will help any more than it has.           O:  MSE:    Attitude: cooperative and friendly  Consciousness: alert  Orientation: oriented to person, place, time, general circumstance  Memory: recent and remote memory intact  Attention/Concentration: intact during session  Speech: normal rate and volume, well-articulated  Mood: \"ok\"  Affect: euthymic and congruent  Perception: within normal limits  Thought Content: within normal limits  Thought Process: logical, coherent and goal-directed  Insight: fair  Judgment: intact  Ability to understand instructions: Yes  Ability to respond meaningfully: Yes  Morbid Ideation: no   Suicide Assessment: no suicidal ideation, plan, or intent  Homicidal Ideation: no    History:    Medications:   Current Outpatient Medications   Medication Sig Dispense Refill    famotidine (PEPCID) 10 MG tablet Take 10 mg by mouth 2 times daily      polyethylene glycol (GLYCOLAX) 17 g packet Take 17 g by mouth daily as needed for Constipation      sennosides-docusate sodium (SENOKOT-S) 8.6-50 MG tablet Take 2 tablets by mouth daily 60 tablet 2    DULoxetine (CYMBALTA) 20 MG extended release capsule Take 1 capsule by mouth daily 30 capsule 3    pregabalin (LYRICA) 25 MG capsule Take 25 mg by mouth 2 times daily. fluticasone (FLONASE) 50 MCG/ACT nasal spray 2 sprays by Each Nostril route daily 16 g 1    traZODone (DESYREL) 50 MG tablet TAKE 1 TABLET BY MOUTH EVERY NIGHT AS NEEDED FOR SLEEP 30 tablet 2    Calcium 600-200 MG-UNIT TABS Take by mouth 2 times daily      Cholecalciferol (VITAMIN D3) 1.25 MG (54911 UT) CAPS Take by mouth daily      Ascorbic Acid (VITAMIN C) 250 MG tablet Take 250 mg by mouth daily      Biotin 1000 MCG TABS Take by mouth daily       No current facility-administered medications for this visit. Social History:   Social History     Socioeconomic History    Marital status:      Spouse name: Not on file    Number of children: Not on file    Years of education: Not on file    Highest education level: Not on file   Occupational History    Not on file   Tobacco Use    Smoking status: Former     Packs/day: 1.00     Years: 8.00     Pack years: 8.00     Types: Cigarettes     Quit date: 1982     Years since quittin.8    Smokeless tobacco: Never   Vaping Use    Vaping Use: Never used   Substance and Sexual Activity    Alcohol use:  Yes     Alcohol/week: 0.0 standard drinks    Drug use: No    Sexual activity: Not on file   Other Topics Concern    Not on file   Social History Narrative    Not on file     Social Determinants of Health     Financial Resource Strain: Low Risk     Difficulty of Paying Living Expenses: Not hard at all   Food Insecurity: No Food Insecurity    Worried About Running Out of Food in the Last Year: Never true    Ran Out of Food in the Last Year: Never true   Transportation Needs: Not on file   Physical Activity: Sufficiently Active    Days of Exercise per Week: 7 days    Minutes of Exercise per Session: 40 min   Stress: Not on file   Social Connections: Not on file   Intimate Partner Violence: Not on file   Housing Stability: Low Risk     Unable to Pay for Housing in the Last Year: No    Number of [place] in the Last Year: 1    Unstable Housing in the Last Year: No     TOBACCO:   reports that she quit smoking about 40 years ago. Her smoking use included cigarettes. She has a 8.00 pack-year smoking history. She has never used smokeless tobacco.  ETOH:   reports current alcohol use. Family History:   Family History   Problem Relation Age of Onset    Heart Disease Mother     COPD Father        A:  Ms. Justin Hargrove continues to struggle with chronic pain, depression, and anxiety. She continues to engage in coping strategies to reinforce her pain and mood difficulties. She continues to be active and engaged and responds fairly to behavioral interventions. She is interested in processing her past divorce and will be referred to specialty mental health. Diagnosis:    1. Chronic pain disorder    2. ESTHER (generalized anxiety disorder)    3. Moderate episode of recurrent major depressive disorder ([address]              Plan:  Pt interventions:    [place]-setting to identify pt's primary goals for [facility] visit / overall health, Supportive techniques, ACT interventions, CBT interventions, and treatment planning      Pt Behavioral Change Plan:   Pt set goals to 1) continue to practice diaphragmatic breathing 2) continue to be mindful about use of good and bad language to describe pain 3) f/u with referrals when provided to specialty mental health 4) Return for follow up, as needed. Please note that portions of this note may have been completed with a voice recognition program. Efforts were made to edit the dictations but occasionally words are mis-transcribed.

## 2022-11-11 DIAGNOSIS — F51.04 PSYCHOPHYSIOLOGICAL INSOMNIA: ICD-10-CM

## 2022-11-13 NOTE — TELEPHONE ENCOUNTER
Refill Request     CONFIRM preferrred pharmacy with the patient. If Mail Order Rx - Pend for 90 day refill. Last Seen: Last Seen Department: 10/6/2022  Last Seen by PCP: 10/6/2022    Last Written: 6/27/2022    If no future appointment scheduled, route STAFF MESSAGE with patient name to the [facility] for scheduling. Next Appointment:   Future Appointments   Date Time Provider Erick Bobby   11/23/2022  2:45 PM MD SANDRA Nugent  Jae - SARAH   5/11/2023  9:00 [facility] L[facility] Jae - DYPAMELA       Message sent to [address] [place] [address] to schedule appt with patient?   NO      Requested Prescriptions     Pending Prescriptions Disp Refills    traZODone (DESYREL) 50 MG tablet [Pharmacy Med Name: TRAZODONE 50MG TABLETS] 30 tablet 2     Sig: TAKE 1 TABLET BY MOUTH EVERY NIGHT AS NEEDED FOR SLEEP   '

## 2022-11-14 RX ORDER — TRAZODONE HYDROCHLORIDE 50 MG/1
TABLET ORAL
Qty: 30 TABLET | Refills: 2 | Status: SHIPPED | OUTPATIENT
Start: 2022-11-14

## 2022-11-21 ENCOUNTER — HOSPITAL ENCOUNTER (OUTPATIENT)
Dept: WOMENS IMAGING | Age: 67
Discharge: HOME | End: 2022-11-21
Payer: MEDICARE

## 2022-11-21 DIAGNOSIS — Z78.0 ASYMPTOMATIC MENOPAUSAL STATE: ICD-10-CM

## 2022-11-21 PROCEDURE — 77080 DXA BONE DENSITY AXIAL: CPT

## 2022-11-23 ENCOUNTER — TELEPHONE (OUTPATIENT)
Dept: FAMILY MEDICINE CLINIC | Age: 67
End: 2022-11-23

## 2022-11-23 DIAGNOSIS — Z12.31 SCREENING MAMMOGRAM FOR BREAST CANCER: Primary | ICD-10-CM

## 2022-11-23 NOTE — TELEPHONE ENCOUNTER
DEXA showed osteoporosis. She has a hx of this and has been on Fosamax and Actonel. If she is interested in restarting medication treatment, I will restart one. Will need an updated dental exam. Please let her know. Mammogram ordered.

## 2022-11-23 NOTE — TELEPHONE ENCOUNTER
Patient would like the results of her dexa scan. She also needs an order for her mammogram, order pending.

## 2022-11-29 ENCOUNTER — OFFICE VISIT (OUTPATIENT)
Dept: ENT CLINIC | Age: 67
End: 2022-11-29
Payer: MEDICARE

## 2022-11-29 ENCOUNTER — TELEMEDICINE (OUTPATIENT)
Dept: FAMILY MEDICINE CLINIC | Age: 67
End: 2022-11-29
Payer: MEDICARE

## 2022-11-29 VITALS
HEART RATE: 69 BPM | TEMPERATURE: 97.3 F | WEIGHT: 107 LBS | BODY MASS INDEX: 18.27 KG/M2 | SYSTOLIC BLOOD PRESSURE: 124 MMHG | RESPIRATION RATE: 16 BRPM | DIASTOLIC BLOOD PRESSURE: 81 MMHG | HEIGHT: 64 IN

## 2022-11-29 DIAGNOSIS — R93.89 ABNORMAL FINDINGS ON DIAGNOSTIC IMAGING OF OTHER SPECIFIED BODY STRUCTURES: ICD-10-CM

## 2022-11-29 DIAGNOSIS — K14.6 BURNING MOUTH SYNDROME: Primary | ICD-10-CM

## 2022-11-29 DIAGNOSIS — M81.0 AGE-RELATED OSTEOPOROSIS WITHOUT CURRENT PATHOLOGICAL FRACTURE: Primary | ICD-10-CM

## 2022-11-29 PROCEDURE — 99214 OFFICE O/P EST MOD 30 MIN: CPT | Performed by: FAMILY MEDICINE

## 2022-11-29 PROCEDURE — 1123F ACP DISCUSS/DSCN MKR DOCD: CPT | Performed by: FAMILY MEDICINE

## 2022-11-29 PROCEDURE — 1123F ACP DISCUSS/DSCN MKR DOCD: CPT | Performed by: OTOLARYNGOLOGY

## 2022-11-29 PROCEDURE — G8484 FLU IMMUNIZE NO ADMIN: HCPCS | Performed by: OTOLARYNGOLOGY

## 2022-11-29 PROCEDURE — G8427 DOCREV CUR MEDS BY ELIG CLIN: HCPCS | Performed by: OTOLARYNGOLOGY

## 2022-11-29 PROCEDURE — G8399 PT W/DXA RESULTS DOCUMENT: HCPCS | Performed by: FAMILY MEDICINE

## 2022-11-29 PROCEDURE — 3017F COLORECTAL CA SCREEN DOC REV: CPT | Performed by: OTOLARYNGOLOGY

## 2022-11-29 PROCEDURE — 1036F TOBACCO NON-USER: CPT | Performed by: FAMILY MEDICINE

## 2022-11-29 PROCEDURE — G8399 PT W/DXA RESULTS DOCUMENT: HCPCS | Performed by: OTOLARYNGOLOGY

## 2022-11-29 PROCEDURE — G8427 DOCREV CUR MEDS BY ELIG CLIN: HCPCS | Performed by: FAMILY MEDICINE

## 2022-11-29 PROCEDURE — 3017F COLORECTAL CA SCREEN DOC REV: CPT | Performed by: FAMILY MEDICINE

## 2022-11-29 PROCEDURE — G8484 FLU IMMUNIZE NO ADMIN: HCPCS | Performed by: FAMILY MEDICINE

## 2022-11-29 PROCEDURE — G8419 CALC BMI OUT NRM PARAM NOF/U: HCPCS | Performed by: FAMILY MEDICINE

## 2022-11-29 PROCEDURE — 99213 OFFICE O/P EST LOW 20 MIN: CPT | Performed by: OTOLARYNGOLOGY

## 2022-11-29 PROCEDURE — 1090F PRES/ABSN URINE INCON ASSESS: CPT | Performed by: OTOLARYNGOLOGY

## 2022-11-29 PROCEDURE — 1090F PRES/ABSN URINE INCON ASSESS: CPT | Performed by: FAMILY MEDICINE

## 2022-11-29 PROCEDURE — 1036F TOBACCO NON-USER: CPT | Performed by: OTOLARYNGOLOGY

## 2022-11-29 PROCEDURE — G8419 CALC BMI OUT NRM PARAM NOF/U: HCPCS | Performed by: OTOLARYNGOLOGY

## 2022-11-29 RX ORDER — CLOTRIMAZOLE 10 MG/1
10 LOZENGE ORAL; TOPICAL
Qty: 50 TABLET | Refills: 0 | Status: SHIPPED | OUTPATIENT
Start: 2022-11-29 | End: 2022-12-09

## 2022-11-29 RX ORDER — ALENDRONATE SODIUM 70 MG/1
70 TABLET ORAL
Qty: 12 TABLET | Refills: 1 | Status: SHIPPED | OUTPATIENT
Start: 2022-11-29

## 2022-11-29 ASSESSMENT — ENCOUNTER SYMPTOMS
BACK PAIN: 1
ABDOMINAL PAIN: 0
COLOR CHANGE: 0
NAUSEA: 0
SHORTNESS OF BREATH: 0
VOMITING: 0

## 2022-11-29 NOTE — PATIENT INSTRUCTIONS
Burning Mouth Syndrome    What is burning mouth syndrome? Primary burning mouth syndrome (BMS) is a benign condition that presents as a burning sensation in the absence of any obvious findings in the mouth and in the absence of abnormal blood tests. BMS affects around 2% of the population with women being seven times more likely to be diagnosed than men. Female patients are predominately post-menopausal, although men and pre/ingris-menopausal women may also be affected. For most patients, burning is experienced on the tip and sides of the tongue, top of the tongue, roof of the mouth, and the inside surface of the lips, although the pattern is highly variable and burning may occur anywhere in the mouth. A patient may feel he/she has burnt the mouth with hot food and there may be a sour, bitter, or metallic taste in the mouth. The mouth may also feel dry. The onset of BMS is usually gradual with no known precipitating factor or event. Three clinical patterns have been well characterized:   1. No or little burning upon waking in the morning, with burning developing as the day progresses, and worst by evening (35%)   2. Continuous symptoms throughout the day from the time one awakes (55%)   3. Intermittent symptoms with some symptom-free days (10%)   What causes BMS? No one really knows what causes BMS. However, it is believed to be a form of neuropathic pain. This means that nerve fibers in your mouth, for now, are functioning abnormally. It has been suggested that the nerves in your mouth that are responsible for feeling pain, are easily stimulated and excited. BMS is not caused by dentures or infections. In general, hormone replacement therapy is not effective in managing BMS in post-menopausal women. Contributing factors may include diabetes, menopause (although we do not know why), adverse life events (loss of job, death of family member or spouse), and anxiety and depression.  Some patients will also report drowsiness when you start taking these medications; however this usually goes away within 1-2 weeks. Furthermore, your prescription will be taken at night just before bedtime to minimize this side effect. Please do not drink alcohol or drive after taking your medications. Zinc 15mg daily  B Complex 2 pills daily  Another medication that may help you is capsaicin, an ingredient in hot chili peppers and Tabasco sauce, Capsaicin has been scientifically proven to help reduce certain kinds of pain. You can try this to see if it will work. Dissolve 5-6 drops of Tabasco sauce in 1 teaspoon of water and rinse your mouth with it three times a day for 30 seconds. The initial feeling is one of burning but within a few minutes, some patients report that the overall burning is much reduced. Non-pharmacologic approaches to management, which may be used alone or in addition to the above medications, include stress management/reduction, meditation, yoga, exercise, and psychotherapy. If stress, anxiety and/or depression are contributing to your BMS, regular use of these techniques or regular counseling may help to reduce your symptoms and keep your drug dosages low. With any therapy for BMS, it may take several weeks or even months before maximum benefits are achieved.       *check oral care products for sodium lauryl sulfate (SLS)

## 2022-11-29 NOTE — PROGRESS NOTES
2022    TELEHEALTH EVALUATION -- Audio/Visual (During ISRRK-57 public health emergency)    HPI:    Irlanda Weir (:  1955) has requested an audio/video evaluation for the following concern(s):    Chief Complaint   Patient presents with    Follow-up     Discuss dexa scan results        DEXA was ordered by Nuerosurgery  Reviewed with pt in detail     Hx of osteoporosis. Hx of fosamax Tx x5 years, hx of Actonel use  Open to restarting Tx  No new fxrs, changes in family hx  Requests labs (mg, vit d, etc)  Needs to get mammogram  Sees a dentist routinely for 15 years, gums are receding    Afraid to exercise and use her elliptical due to her back pain and this information   Will update neurosurgery and assess new recommendations for potential surgery    Review of Systems   Constitutional:  Negative for activity change, chills, diaphoresis, fatigue, fever and unexpected weight change. Respiratory:  Negative for shortness of breath. Cardiovascular:  Negative for chest pain and leg swelling. Gastrointestinal:  Negative for abdominal pain, nausea and vomiting. Genitourinary:  Negative for difficulty urinating. Musculoskeletal:  Positive for arthralgias, back pain and myalgias. Negative for gait problem, joint swelling, neck pain and neck stiffness. Skin:  Negative for color change, pallor, rash and wound. Neurological:  Negative for weakness, numbness and headaches. Psychiatric/Behavioral:  Positive for sleep disturbance. Negative for dysphoric mood. The patient is nervous/anxious. Prior to Visit Medications    Medication Sig Taking?  Authorizing Provider   alendronate (FOSAMAX) 70 MG tablet Take 1 tablet by mouth every 7 days Yes Lemuel Raines MD   traZODone (DESYREL) 50 MG tablet TAKE 1 TABLET BY MOUTH EVERY NIGHT AS NEEDED FOR SLEEP Yes Lemuel Raines MD   polyethylene glycol (GLYCOLAX) 17 g packet Take 17 g by mouth daily as needed for Constipation Yes Historical Provider, MD DULoxetine (CYMBALTA) 20 MG extended release capsule Take 1 capsule by mouth daily Yes Chino Lora MD   Calcium 600-200 MG-UNIT TABS Take by mouth 2 times daily Yes Historical Provider, MD   Cholecalciferol (VITAMIN D3) 1.25 MG (38817 UT) CAPS Take by mouth daily Yes Historical Provider, MD   Ascorbic Acid (VITAMIN C) 250 MG tablet Take 250 mg by mouth daily Yes Historical Provider, MD   Biotin 1000 MCG TABS Take by mouth daily Yes Historical Provider, MD       Social History     Tobacco Use    Smoking status: Former     Packs/day: 1.00     Years: 8.00     Pack years: 8.00     Types: Cigarettes     Quit date: 1982     Years since quittin.9    Smokeless tobacco: Never   Vaping Use    Vaping Use: Never used   Substance Use Topics    Alcohol use: Yes     Alcohol/week: 0.0 standard drinks    Drug use: No        Allergies   Allergen Reactions    Codeine Other (See Comments) and Nausea And Vomiting     GI   ,   Past Medical History:   Diagnosis Date    Allergic rhinitis, cause unspecified     Osteoporosis, unspecified     Pure hypercholesterolemia     Unspecified acute reaction to stress    ,   Past Surgical History:   Procedure Laterality Date    EYE SURGERY Right    ,   Social History     Tobacco Use    Smoking status: Former     Packs/day: 1.00     Years: 8.00     Pack years: 8.00     Types: Cigarettes     Quit date: 1982     Years since quittin.9    Smokeless tobacco: Never   Vaping Use    Vaping Use: Never used   Substance Use Topics    Alcohol use:  Yes     Alcohol/week: 0.0 standard drinks    Drug use: No   ,   Family History   Problem Relation Age of Onset    Heart Disease Mother     COPD Father    ,   Immunization History   Administered Date(s) Administered    COVID-19, PFIZER PURPLE top, DILUTE for use, (age 15 y+), 30mcg/0.3mL 2021, 2021    INFLUENZA, INTRADERMAL, QUADRIVALENT, PRESERVATIVE FREE 2016    Influenza Virus Vaccine 2011, 2017    Influenza, AFLURIA (age 1 yrs+), FLUZONE, (age 10 mo+), MDV, 0.5mL 10/14/2015, 09/30/2016, 09/29/2017    Influenza, FLUAD, (age 72 y+), Adjuvanted, 0.5mL 11/22/2021    Influenza, FLUARIX, FLULAVAL, FLUZONE (age 10 mo+) AND AFLURIA, (age 1 y+), PF, 0.5mL 09/25/2017, 11/20/2019    Influenza, Intradermal, Preservative free 10/14/2015    Tdap (Boostrix, Adacel) 03/09/2016   ,   Health Maintenance   Topic Date Due    Shingles vaccine (1 of 2) Never done    Pneumococcal 65+ years Vaccine (1 - PCV) Never done    COVID-19 Vaccine (3 - Booster for Pfizer series) 06/02/2021    Flu vaccine (1) 08/01/2022    Breast cancer screen  08/14/2022    Annual Wellness Visit (AWV)  05/06/2023    Depression Monitoring  09/30/2023    Colorectal Cancer Screen  11/22/2025    DTaP/Tdap/Td vaccine (2 - Td or Tdap) 03/09/2026    Lipids  09/02/2026    DEXA (modify frequency per FRAX score)  Completed    Hepatitis C screen  Completed    Hepatitis A vaccine  Aged Out    Hib vaccine  Aged Out    Meningococcal (ACWY) vaccine  Aged Out       PHYSICAL EXAMINATION:  [ INSTRUCTIONS:  \"[x]\" Indicates a positive item  \"[]\" Indicates a negative item  -- DELETE ALL ITEMS NOT EXAMINED]  Vital Signs: (As obtained by patient/caregiver or practitioner observation)    Blood pressure-  Heart rate-    Respiratory rate-    Temperature-  Pulse oximetry-     Constitutional: [x] Appears well-developed and well-nourished [x] No apparent distress      [] Abnormal-   Mental status  [x] Alert and awake  [] Oriented to person/place/time [x]Able to follow commands      Eyes:  EOM    [x]  Normal  [] Abnormal-  Sclera  [x]  Normal  [] Abnormal -         Discharge []  None visible  [] Abnormal -    HENT:   [x] Normocephalic, atraumatic.   [x] Abnormal   [] Mouth/Throat: Mucous membranes are moist.     External Ears [x] Normal  [] Abnormal-     Neck: [x] No visualized mass     Pulmonary/Chest: [x] Respiratory effort normal.  [x] No visualized signs of difficulty breathing or respiratory distress        [] Abnormal-      Musculoskeletal:   [] Normal gait with no signs of ataxia         [x] Normal range of motion of neck        [] Abnormal-       Neurological:        [x] No Facial Asymmetry (Cranial nerve 7 motor function) (limited exam to video visit)          [] No gaze palsy        [] Abnormal-         Skin:        [x] No significant exanthematous lesions or discoloration noted on facial skin         [] Abnormal-            Psychiatric:       [x] Normal Affect [] No Hallucinations        [] Abnormal-     Other pertinent observable physical exam findings-     ASSESSMENT/PLAN:  1. Age-related osteoporosis without current pathological fracture  DEXA scan reviewed, [place] rec'd   Agreeable to trial Fosamax again  Requested labs, will complete today   Aes reviewed in detail, administration reviewed as well  Will update her dentist for recommendations   Will update Neurosurgery as well  DEXA in 1-2 years   - alendronate (FOSAMAX) 70 MG tablet; Take 1 tablet by mouth every 7 days  Dispense: 12 tablet; Refill: 1  - Vitamin D 25 Hydroxy; Future  - Magnesium; Future  - Phosphorus; Future  - TSH with Reflex; Future    2. Abnormal findings on diagnostic imaging of other specified body structures   - TSH with Reflex; Future    I have spent 30 minutes on patient care, with more than 50% spent counseling and coordinating care for diagnoses and plans listed above. Return if symptoms worsen or fail to improve. Dmitry Jimenez is a 79 y.o. female being evaluated by a Virtual Visit (video visit) encounter to address concerns as mentioned above. A caregiver was present when appropriate. Due to this being a TeleHealth encounter (During BEKWW-19 public health emergency), evaluation of the following organ systems was limited: Vitals/Constitutional/EENT/Resp/CV/GI//MS/Neuro/Skin/Heme-Lymph-Imm.   Pursuant to the emergency declaration under the [facility]5 waiver authority and the Coronavirus Preparedness and Response Supplemental Appropriations Act, this Virtual Visit was conducted with patient's (and/or legal guardian's) consent, to reduce the patient's risk of exposure to COVID-19 and provide necessary medical care. The patient (and/or legal guardian) has also been advised to contact this office for worsening conditions or problems, and seek emergency medical treatment and/or call 911 if deemed necessary. Services were provided through a video synchronous discussion virtually to substitute for in-person clinic visit. Patient and provider were located at their individual homes. --Lennie Warner MD on 11/29/2022 at 12:22 PM    An electronic signature was used to authenticate this note.

## 2022-11-29 NOTE — PROGRESS NOTES
[facility], [address], [address], [address] [place] [address]  P: [phone]       Patient     Ravinder Jalloh  1955    ChiefComplaint     Chief Complaint   Patient presents with    Follow-up     Patient is here to discuss her tongue. She states that it feels like it is on fire and is white in color. History of Present Illness     Brooks is a 15-year-old female here today for evaluation of burning mouth. Last seen by me 2021. Burning mouth has been persistent intermittently since then. Worse since March. Reflux well controlled now. Burning mouth is irritated by hot beverages and when ill. Pain can be intense. Recently treated for thrush with improvement but not resolution in symptoms. Past Medical History     Past Medical History:   Diagnosis Date    Allergic rhinitis, cause unspecified     Osteoporosis, unspecified     Pure hypercholesterolemia     Unspecified acute reaction to stress        Past Surgical History     Past Surgical History:   Procedure Laterality Date    EYE SURGERY Right        Family History     Family History   Problem Relation Age of Onset    Heart Disease Mother     COPD Father        Social History     Social History     Tobacco Use    Smoking status: Former     Packs/day: 1.00     Years: 8.00     Pack years: 8.00     Types: Cigarettes     Quit date: 1982     Years since quittin.9    Smokeless tobacco: Never   Vaping Use    Vaping Use: Never used   Substance Use Topics    Alcohol use:  Yes     Alcohol/week: 0.0 standard drinks    Drug use: No        Allergies     Allergies   Allergen Reactions    Codeine Other (See Comments) and Nausea And Vomiting     GI       Medications     Current Outpatient Medications   Medication Sig Dispense Refill    clotrimazole (MYCELEX) 10 MG nabeel Take 1 tablet by mouth 5 times daily for 10 days 50 tablet 0    traZODone (DESYREL) 50 MG tablet TAKE 1 TABLET BY MOUTH EVERY NIGHT AS NEEDED FOR SLEEP 30 tablet 2    polyethylene glycol (GLYCOLAX) 17 g packet Take 17 g by mouth daily as needed for Constipation      DULoxetine (CYMBALTA) 20 MG extended release capsule Take 1 capsule by mouth daily 30 capsule 3    Calcium 600-200 MG-UNIT TABS Take by mouth 2 times daily      Cholecalciferol (VITAMIN D3) 1.25 MG (23195 UT) CAPS Take by mouth daily      Ascorbic Acid (VITAMIN C) 250 MG tablet Take 250 mg by mouth daily      Biotin 1000 MCG TABS Take by mouth daily      alendronate (FOSAMAX) 70 MG tablet Take 1 tablet by mouth every 7 days (Patient not taking: Reported on 11/29/2022) 12 tablet 1     No current facility-administered medications for this visit. Review of Systems     Review of Systems   Constitutional:  Negative for appetite change, chills, fatigue, fever and unexpected weight change. HENT:  Negative for congestion, ear discharge, ear pain, facial swelling, hearing loss, nosebleeds, postnasal drip, sinus pressure, sneezing, sore throat, tinnitus, trouble swallowing and voice change. Eyes:  Negative for itching. Respiratory:  Negative for apnea, cough and shortness of breath. Endocrine: Negative for cold intolerance and heat intolerance. Musculoskeletal:  Negative for myalgias and neck pain. Skin:  Negative for rash. Allergic/Immunologic: Negative for environmental allergies. Neurological:  Negative for dizziness and headaches. Psychiatric/Behavioral:  Negative for confusion, decreased concentration and sleep disturbance. PhysicalExam     Vitals:    11/29/22 1519   BP: 124/81   Site: Right Upper Arm   Position: Sitting   Cuff Size: Medium Adult   Pulse: 69   Resp: 16   Temp: 97.3 °F (36.3 °C)   TempSrc: Infrared   Weight: 107 lb (48.5 kg)   Height: 5' 4\" (1.626 m)       Physical Exam  Constitutional:       General: She is not in acute distress. Appearance: She is well-developed. HENT:      Head: Normocephalic and atraumatic.       Right Ear: Tympanic membrane, ear canal and Refill: 0          Shalonda Reta, DO  12/2/22      Portions of this note were dictated using Dragon.  There may be linguistic errors secondary to the use of this program.

## 2022-12-01 ENCOUNTER — TELEPHONE (OUTPATIENT)
Dept: FAMILY MEDICINE CLINIC | Age: 67
End: 2022-12-01

## 2022-12-01 NOTE — TELEPHONE ENCOUNTER
Pt called very upset. She states her neurosurgeon will not be able to do her surgery because of her Dexa scan results. Aleida Teague her neurosurgeon asked her to call Dr. Wili He for a referral for an endocrinologist so that a specialist can help figure out the cause of this so that hopefully she could have her surgery done at some point.    Please place referral.

## 2022-12-02 ASSESSMENT — ENCOUNTER SYMPTOMS
EYE ITCHING: 0
SORE THROAT: 0
VOICE CHANGE: 0
COUGH: 0
APNEA: 0
TROUBLE SWALLOWING: 0
SHORTNESS OF BREATH: 0
SINUS PRESSURE: 0
FACIAL SWELLING: 0

## 2022-12-05 ENCOUNTER — TELEPHONE (OUTPATIENT)
Dept: FAMILY MEDICINE CLINIC | Age: 67
End: 2022-12-05

## 2022-12-05 DIAGNOSIS — Z78.9 PATIENT REQUESTS ALTERNATE TREATMENT: Primary | ICD-10-CM

## 2022-12-05 NOTE — TELEPHONE ENCOUNTER
From: Charlee Bedoya  To: Dr. Ga Dailey: 12/5/2022  8:51 AM EST  Subject: Osteoperosis - Need referral for an Endocrinologist    Dr. Jayson Smith,    Dr. Elpidio Cowden, my neurosurgeon, will not do my S. I. Joint surgery because my numbers are bad for Osteoperosis. He said I need to see an Endocrinologist.  Will you please put in a referral for me to see one? I would like to make an appt. as soon as possible.       Thank you & have a good day

## 2022-12-07 ENCOUNTER — TELEPHONE (OUTPATIENT)
Dept: FAMILY MEDICINE CLINIC | Age: 67
End: 2022-12-07

## 2022-12-07 DIAGNOSIS — M81.0 OSTEOPOROSIS, UNSPECIFIED OSTEOPOROSIS TYPE, UNSPECIFIED PATHOLOGICAL FRACTURE PRESENCE: Primary | ICD-10-CM

## 2022-12-07 NOTE — TELEPHONE ENCOUNTER
Kael Valdez [facility] Practice Support (supporting Ibis Bass) 19 hours ago (12:33 PM)     Idella Severin [place],  I just called the number above to see an Endocrinologist, and they need a diagnosis before they can make an appointment for me. It currently says \"alternate treatment\". The diagnosis needs to say \"Osteoporosis\". Please send this over to them, and let me know when I can call back to schedule an appt. Thank you!

## 2022-12-07 NOTE — TELEPHONE ENCOUNTER
This referral was requested by pt and neurosurgery. I filled the request so the diagnosis code will stay the same based on the request. Her osteoporosis was being treated prior to this referral request.     When a pt or specialist requests a referral, this is the appropriate code that has to be used. Please let pt and the Endocrinologists office know.

## 2022-12-07 NOTE — TELEPHONE ENCOUNTER
Spoke with the patient, informed her Dr. Belle Bearden message. Patient request that I call Dr. Meryle Ohs and the endo to let them know about the referral. I recommend that the patient calls Dr. Meryle Ohs for a referral, however she would prefer I call. I told the patient that I would call but she should follow up with both offices. Left message with Joselin Haynes, Dr. Harris Gallo assistant. Left a detailed message that she will need to have Dr. Meryle Ohs send the referral to endo. Spoke with Abby Thornton, informed her of Dr. Belle Bearden message and recommend they call Dr. Meryle Ohs.      Dr. Taqueria Reilly [facility] ([place]):[phone]

## 2022-12-08 ENCOUNTER — TELEPHONE (OUTPATIENT)
Dept: ENDOCRINOLOGY | Age: 67
End: 2022-12-08

## 2022-12-08 NOTE — TELEPHONE ENCOUNTER
Received phone call from isaias with Dr Mich Faye office she stated that they will place a referral to the next available and that once this is placed they will call the pt and inform her that she can call to sched the appt. LMOM for  pt to advise pt that she will be receiving a phone call from Dr. Mich Faye office once this referral has been placed.

## 2022-12-15 ENCOUNTER — TELEPHONE (OUTPATIENT)
Dept: FAMILY MEDICINE CLINIC | Age: 67
End: 2022-12-15

## 2022-12-15 NOTE — TELEPHONE ENCOUNTER
This is a good medication to treat osteoporosis, which is more harmful to her at this time. In the past, she and her neurosurgeon requested a referral to Endocrinology to manage this. I would recommend following up with them to assess new needs and recommendations.  Thank you

## 2022-12-15 NOTE — TELEPHONE ENCOUNTER
Received phone call from pt in regards to her Fosamax she stated that she has talked to others around her who are telling her that she needs to not be taking this medication due to it has been recalled and that it can cause fractures more than anything. Pt stated that she is scared to continue to take this medication and is asking for any alternatives or what the next recommendations are regarding this matter. She is due to take the Fosamax again yesterday.     Good call back #: [phone]

## 2022-12-27 ENCOUNTER — TELEPHONE (OUTPATIENT)
Dept: ENT CLINIC | Age: 67
End: 2022-12-27

## 2022-12-28 ENCOUNTER — TELEPHONE (OUTPATIENT)
Dept: ENDOCRINOLOGY | Age: 67
End: 2022-12-28

## 2022-12-28 DIAGNOSIS — K14.6 BURNING MOUTH SYNDROME: Primary | ICD-10-CM

## 2023-01-12 ENCOUNTER — TELEMEDICINE (OUTPATIENT)
Dept: PRIMARY CARE CLINIC | Age: 68
End: 2023-01-12
Payer: MEDICARE

## 2023-01-12 DIAGNOSIS — J06.9 VIRAL URI WITH COUGH: Primary | ICD-10-CM

## 2023-01-12 PROCEDURE — G8399 PT W/DXA RESULTS DOCUMENT: HCPCS | Performed by: NURSE PRACTITIONER

## 2023-01-12 PROCEDURE — 99213 OFFICE O/P EST LOW 20 MIN: CPT | Performed by: NURSE PRACTITIONER

## 2023-01-12 PROCEDURE — 3017F COLORECTAL CA SCREEN DOC REV: CPT | Performed by: NURSE PRACTITIONER

## 2023-01-12 PROCEDURE — 1123F ACP DISCUSS/DSCN MKR DOCD: CPT | Performed by: NURSE PRACTITIONER

## 2023-01-12 PROCEDURE — 1090F PRES/ABSN URINE INCON ASSESS: CPT | Performed by: NURSE PRACTITIONER

## 2023-01-12 PROCEDURE — G8427 DOCREV CUR MEDS BY ELIG CLIN: HCPCS | Performed by: NURSE PRACTITIONER

## 2023-01-12 RX ORDER — TRIAMCINOLONE ACETONIDE 55 UG/1
2 SPRAY, METERED NASAL DAILY
Qty: 1 EACH | Refills: 0 | Status: SHIPPED | OUTPATIENT
Start: 2023-01-12

## 2023-01-12 RX ORDER — SOD CHLOR,BICARB/SQUEEZ BOTTLE
1 PACKET, WITH RINSE DEVICE NASAL 2 TIMES DAILY PRN
Qty: 1 EACH | Refills: 0 | Status: SHIPPED | OUTPATIENT
Start: 2023-01-12 | End: 2023-02-11

## 2023-01-12 RX ORDER — BENZONATATE 200 MG/1
200 CAPSULE ORAL 3 TIMES DAILY PRN
Qty: 30 CAPSULE | Refills: 0 | Status: SHIPPED | OUTPATIENT
Start: 2023-01-12 | End: 2023-01-22

## 2023-01-12 ASSESSMENT — ENCOUNTER SYMPTOMS
WHEEZING: 0
SORE THROAT: 1
SINUS PRESSURE: 1
COUGH: 1
SINUS COMPLAINT: 1
SINUS PAIN: 1
SHORTNESS OF BREATH: 0

## 2023-01-12 NOTE — LETTER
I had the pleasure of seeing Lynn Wolf today for a primary care Virtualist video visit. Our team would love your overall feedback on this visit. Please hit shift and click the following link to let us know if the Virtualist service met your expectations. [facility] CenterInternet[facility]. [email or web address]      Electronically signed by JUSTIN Cheng CNP on 1/12/23 at 10:14 AM EST.

## 2023-01-12 NOTE — PROGRESS NOTES
Dawson Collet (:  1955) is a Established patient, here for evaluation of the following:    URI (Sore throat, congestion, headache, fatigue x 1.5 weeks)       Assessment & Plan:  Below is the assessment and plan developed based on review of pertinent history, physical exam, labs, studies, and medications. 1. Viral URI with cough  -     Hypertonic Nasal Wash (SINUS RINSE BOTTLE KIT) PACK; 1 packet by Nasal route 2 times daily as needed (congestion), Disp-1 each, R-0Normal  -     triamcinolone (NASACORT) 55 MCG/ACT nasal inhaler; 2 sprays by Each Nostril route daily, Disp-1 each, R-0Normal  -     benzonatate (TESSALON) 200 MG capsule; Take 1 capsule by mouth 3 times daily as needed for Cough, Disp-30 capsule, R-0Normal  The patient was educated on reasons to seek urgent medical care including chest pain, shortness of breath or difficulty breathing at rest, severe pain, fever >102 not controlled by medication, blue-tinged lips or nailbeds, and severe weakness or confusion. Patient verbalized understanding    Return if symptoms worsen or fail to improve, for viral uri with cough. Subjective: Her daughter's family has been ill and she thinks she has contracted a similar illness. Has been ill for 10 days already. No covid test.  Sinus Problem  This is a new problem. The current episode started 1 to 4 weeks ago. The problem is unchanged. There has been no fever. Associated symptoms include congestion, coughing, headaches (forehead), sinus pressure and a sore throat. Pertinent negatives include no chills, ear pain or shortness of breath. Treatments tried: taking tylenol, dayquil, nyquil. Review of Systems   Constitutional:  Positive for fatigue. Negative for chills and fever. HENT:  Positive for congestion, postnasal drip, sinus pressure, sinus pain and sore throat. Negative for ear pain. Eyes:  Negative for visual disturbance. Respiratory:  Positive for cough.  Negative for shortness of breath and wheezing. Musculoskeletal:  Negative for myalgias. Neurological:  Positive for dizziness and headaches (forehead).    Ears feel full  One week ago had a PRP injection in her lower back and can't take ibuprofen  Started with symptoms 1-2 days ahead of that time after celebrating a birthday with her daughter and family who were all ill  Objective:  Patient-Reported Vitals  No data recorded     Patient-Reported Vitals 11/29/2022   Patient-Reported Weight 106 lb   Patient-Reported Height 5'4.5   Patient-Reported Systolic -   Patient-Reported Diastolic -        Physical Exam:  [INSTRUCTIONS:  \"[x]\" Indicates a positive item  \"[]\" Indicates a negative item  -- DELETE ALL ITEMS NOT EXAMINED]    Constitutional: [x] Appears well-developed and well-nourished [x] No apparent distress      [] Abnormal -     Mental status: [x] Alert and awake  [x] Oriented to person/place/time [x] Able to follow commands    [] Abnormal -     Eyes:   EOM    [x]  Normal    [] Abnormal -   Sclera  [x]  Normal    [] Abnormal -          Discharge [x]  None visible   [] Abnormal -     HENT: [x] Normocephalic, atraumatic  [] Abnormal -   [x] Mouth/Throat: Mucous membranes are moist    External Ears [x] Normal  [] Abnormal -    Neck: [x] No visualized mass [] Abnormal -     Pulmonary/Chest: [x] Respiratory effort normal   [x] No visualized signs of difficulty breathing or respiratory distress        [] Abnormal -      Musculoskeletal:   [] Normal gait with no signs of ataxia         [x] Normal range of motion of neck        [] Abnormal -     Neurological:        [x] No Facial Asymmetry (Cranial nerve 7 motor function) (limited exam due to video visit)          [x] No gaze palsy        [] Abnormal -          Skin:        [x] No significant exanthematous lesions or discoloration noted on facial skin         [] Abnormal -            Psychiatric:       [x] Normal Affect [] Abnormal -        [] No Hallucinations    Other pertinent observable physical exam findings:-        On this date 1/12/2023 I have spent 23 minutes reviewing previous notes, test results and face to face (virtual) with the patient discussing the diagnosis and importance of compliance with the treatment plan as well as documenting on the day of the visit. [facility], was evaluated through a synchronous (real-time) audio-video encounter. The patient (or guardian if applicable) is aware that this is a billable service, which includes applicable co-pays. This Virtual Visit was conducted with patient's (and/or legal guardian's) consent. The visit was conducted pursuant to the emergency declaration under the [facility], [address] [place] [address] authority and the [employer] General Act. Patient identification was verified, and a caregiver was present when appropriate. The patient was located at Home: [address] [place] [address].    Provider was located at Home ([facility]): [address] [facility], [facility]

## 2023-01-16 ENCOUNTER — APPOINTMENT (OUTPATIENT)
Dept: CT IMAGING | Age: 68
End: 2023-01-16
Payer: MEDICARE

## 2023-01-16 ENCOUNTER — HOSPITAL ENCOUNTER (EMERGENCY)
Age: 68
Discharge: HOME | End: 2023-01-16
Payer: MEDICARE

## 2023-01-16 VITALS
SYSTOLIC BLOOD PRESSURE: 165 MMHG | RESPIRATION RATE: 18 BRPM | OXYGEN SATURATION: 99 % | HEART RATE: 80 BPM | TEMPERATURE: 98.2 F | DIASTOLIC BLOOD PRESSURE: 90 MMHG

## 2023-01-16 DIAGNOSIS — S22.078A OTHER CLOSED FRACTURE OF TENTH THORACIC VERTEBRA, INITIAL ENCOUNTER (HCC): Primary | ICD-10-CM

## 2023-01-16 PROCEDURE — 72128 CT CHEST SPINE W/O DYE: CPT

## 2023-01-16 PROCEDURE — 6370000000 HC RX 637 (ALT 250 FOR IP): Performed by: NURSE PRACTITIONER

## 2023-01-16 PROCEDURE — 72131 CT LUMBAR SPINE W/O DYE: CPT

## 2023-01-16 PROCEDURE — 99284 EMERGENCY DEPT VISIT MOD MDM: CPT

## 2023-01-16 RX ORDER — HYDROCODONE BITARTRATE AND ACETAMINOPHEN 5; 325 MG/1; MG/1
1 TABLET ORAL EVERY 6 HOURS PRN
Qty: 12 TABLET | Refills: 0 | Status: SHIPPED | OUTPATIENT
Start: 2023-01-16 | End: 2023-01-19

## 2023-01-16 RX ORDER — OXYCODONE HYDROCHLORIDE AND ACETAMINOPHEN 5; 325 MG/1; MG/1
1 TABLET ORAL ONCE
Status: COMPLETED | OUTPATIENT
Start: 2023-01-16 | End: 2023-01-16

## 2023-01-16 RX ADMIN — OXYCODONE AND ACETAMINOPHEN 1 TABLET: 5; 325 TABLET ORAL at 20:04

## 2023-01-16 ASSESSMENT — PAIN SCALES - GENERAL
PAINLEVEL_OUTOF10: 10

## 2023-01-16 ASSESSMENT — PAIN DESCRIPTION - LOCATION
LOCATION: BACK

## 2023-01-16 ASSESSMENT — PAIN DESCRIPTION - DESCRIPTORS
DESCRIPTORS: SHARP
DESCRIPTORS: SHARP

## 2023-01-16 ASSESSMENT — PAIN DESCRIPTION - ORIENTATION
ORIENTATION: LOWER

## 2023-01-16 ASSESSMENT — PAIN - FUNCTIONAL ASSESSMENT: PAIN_FUNCTIONAL_ASSESSMENT: 0-10

## 2023-01-16 ASSESSMENT — PAIN DESCRIPTION - PAIN TYPE: TYPE: ACUTE PAIN

## 2023-01-17 NOTE — CONSULTS
Placed call to Neurosurgery @ 2009  RE:  t-10 superior endplate fracture per Brenda Jorge MD called back @ 2029

## 2023-01-18 DIAGNOSIS — F32.A ANXIETY AND DEPRESSION: ICD-10-CM

## 2023-01-18 DIAGNOSIS — F41.9 ANXIETY AND DEPRESSION: ICD-10-CM

## 2023-01-18 DIAGNOSIS — G89.29 CHRONIC BILATERAL LOW BACK PAIN, UNSPECIFIED WHETHER SCIATICA PRESENT: ICD-10-CM

## 2023-01-18 DIAGNOSIS — M54.50 CHRONIC BILATERAL LOW BACK PAIN, UNSPECIFIED WHETHER SCIATICA PRESENT: ICD-10-CM

## 2023-01-18 RX ORDER — DULOXETIN HYDROCHLORIDE 20 MG/1
20 CAPSULE, DELAYED RELEASE ORAL DAILY
Qty: 30 CAPSULE | Refills: 3 | Status: SHIPPED | OUTPATIENT
Start: 2023-01-18

## 2023-01-18 NOTE — TELEPHONE ENCOUNTER
Refill Request     CONFIRM preferrred pharmacy with the patient. If Mail Order Rx - Pend for 90 day refill. Last Seen: Last Seen Department: 11/29/2022  Last Seen by PCP: 11/29/2022    Last Written: 9/29/2022    If no future appointment scheduled, route STAFF MESSAGE with patient name to the [facility] for scheduling. Next Appointment:   Future Appointments   Date Time Provider Erick Bobby   3/9/2023  1:00 PM Adolfo Bartholomew MD [facility]   5/11/2023  9:00 AM SCHEDULE, GAILX SANDRA  AWV ERMIAS [facility] - [facility]       Message sent to [address] [place] [address] to schedule appt with patient?   NO      Requested Prescriptions     Pending Prescriptions Disp Refills    DULoxetine (CYMBALTA) 20 MG extended release capsule [Pharmacy Med Name: DULOXETINE DR 20MG CAPSULES] 30 capsule 3     Sig: TAKE 1 CAPSULE BY MOUTH DAILY

## 2023-01-18 NOTE — ED PROVIDER NOTES
[facility]  ED  EMERGENCY DEPARTMENT ENCOUNTER        Pt Name: Hannah Moore  MRN: [id number]  [place] 1955  Date of evaluation: 1/16/2023  Provider: JUSTIN Bell - CNP  PCP: Dustin Leung MD        CRAIG. I have evaluated this patient. My supervising physician was available for consultation. CHIEF COMPLAINT       Chief Complaint   Patient presents with    Back Pain     Last week pt had several coughing episodes she is concerned she may have broken her lower back. Pain is worse on right side. Denies falls. HISTORY OF PRESENT ILLNESS: 1 or more Elements     History from : Patient    Limitations to history : None    Hannah Moore is a 79 y.o. female who presents with back pain. Patient states that she had several episodes of coughing recently and she is now developed back pain, concerned about fracture she states that she has osteoporosis. There is no direct trauma she states that her pain is mid back, worse with movement now. She is here for further evaluation. Nursing Notes were all reviewed and agreed with or any disagreements were addressed in the HPI. REVIEW OF SYSTEMS :      Review of Systems    Positives and Pertinent negatives as per HPI.      SURGICAL HISTORY     Past Surgical History:   Procedure Laterality Date    EYE SURGERY Right        CURRENTMEDICATIONS       Discharge Medication List as of 1/16/2023  8:54 PM        CONTINUE these medications which have NOT CHANGED    Details   Hypertonic Nasal Wash (SINUS RINSE BOTTLE KIT) PACK 1 packet by Nasal route 2 times daily as needed (congestion), Disp-1 each, R-0Normal      triamcinolone (NASACORT) 55 MCG/ACT nasal inhaler 2 sprays by Each Nostril route daily, Disp-1 each, R-0Normal      benzonatate (TESSALON) 200 MG capsule Take 1 capsule by mouth 3 times daily as needed for Cough, Disp-30 capsule, R-0Normal      traMADol-acetaminophen (ULTRACET) 37.5-325 MG per tablet Ultracet 37.5-325 mg tabletHistorical Med      alendronate (FOSAMAX) 70 MG tablet Take 1 tablet by mouth every 7 days, Disp-12 tablet, R-1Normal      traZODone (DESYREL) 50 MG tablet TAKE 1 TABLET BY MOUTH EVERY NIGHT AS NEEDED FOR SLEEP, Disp-30 tablet, R-2Normal      polyethylene glycol (GLYCOLAX) 17 g packet Take 17 g by mouth daily as needed for ConstipationHistorical Med      DULoxetine (CYMBALTA) 20 MG extended release capsule Take 1 capsule by mouth daily, Disp-30 capsule, R-3Normal      Calcium 600-200 MG-UNIT TABS Take by mouth 2 times dailyHistorical Med      Cholecalciferol (VITAMIN D3) 1.25 MG (37068 UT) CAPS Take by mouth dailyHistorical Med      Ascorbic Acid (VITAMIN C) 250 MG tablet Take 250 mg by mouth dailyHistorical Med      Biotin 1000 MCG TABS Take by mouth dailyHistorical Med             ALLERGIES     Codeine    FAMILYHISTORY       Family History   Problem Relation Age of Onset    Heart Disease Mother     COPD Father         SOCIAL HISTORY       Social History     Tobacco Use    Smoking status: Former     Packs/day: 1.00     Years: 8.00     Pack years: 8.00     Types: Cigarettes     Quit date: 1982     Years since quittin.0    Smokeless tobacco: Never   Vaping Use    Vaping Use: Never used   Substance Use Topics    Alcohol use: Yes     Alcohol/week: 0.0 standard drinks    Drug use: No       SCREENINGS        Payne Coma Scale  Eye Opening: Spontaneous  Best Verbal Response: Oriented  Best Motor Response: Obeys commands  Rd Coma Scale Score: 15                CIWA Assessment  BP: (!) 165/90  Heart Rate: 80           PHYSICAL EXAM  1 or more Elements     ED Triage Vitals   BP Temp Temp Source Heart Rate Resp SpO2 Height Weight   23 1811 23 1812 23 18123 18123 1811 23 181 -- --   (!) 178/100 98.2 °F (36.8 °C) Oral 83 18 98 %         Physical Exam    Abdomen is soft, nontender.   Chest is nontender, lungs are clear to auscultation bilaterally, heart regular rate and rhythm with no murmur. There is diffuse tenderness noted to the thoracic and lumbar spine. No tingling or numbness, no saddle anesthesia no evidence of cauda equina. DIAGNOSTIC RESULTS   LABS:    Labs Reviewed - No data to display    When ordered only abnormal lab results are displayed. All other labs were within normal range or not returned as of this dictation. EKG: When ordered, EKG's are interpreted by the Emergency Department Physician in the absence of a cardiologist.  Please see their note for interpretation of EKG. RADIOLOGY:   Non-plain film images such as CT, Ultrasound and MRI are read by the radiologist. Plain radiographic images are visualized and preliminarily interpreted by the ED Provider with the below findings:        Interpretation per the Radiologist below, if available at the time of this note:    CT LUMBAR SPINE WO CONTRAST   Final Result   Unremarkable non-contrast CT of the lumbar spine. CT THORACIC SPINE WO CONTRAST   Final Result   1. Acute appearing fracture involving the superior endplate of the Q36   vertebral body. No evidence of retropulsion. 2.  Incidental note is made of a 1.5 x 1.8 cm soft tissue pulmonary nodule in   the right upper lobe. Recommend prompt noncontrast chest CT for further   evaluation. CT THORACIC SPINE WO CONTRAST    Result Date: 1/16/2023  EXAMINATION: CT OF THE THORACIC SPINE WITHOUT CONTRAST  1/16/2023 7:00 pm: TECHNIQUE: CT of the thoracic spine was performed without the administration of intravenous contrast. Multiplanar reformatted images are provided for review. Automated exposure control, iterative reconstruction, and/or weight based adjustment of the mA/kV was utilized to reduce the radiation dose to as low as reasonably achievable. COMPARISON: None.  HISTORY: ORDERING SYSTEM PROVIDED HISTORY: pain TECHNOLOGIST PROVIDED HISTORY: Reason for exam:->pain Reason for Exam: pt stating having back spasms, NKI, sharp thoracic and lumbar pain, rt side worse side. Relevant Medical/Surgical History: no hx back sx FINDINGS: BONES/ALIGNMENT: There is an acute appearing fracture involving the superior endplate of the B98 vertebral body. No evidence of retropulsion. DEGENERATIVE CHANGES: No gross spinal canal stenosis or bony neural foraminal narrowing of the thoracic spine. SOFT TISSUES: No paraspinal mass is seen. Incidental note is made of a punctate nonobstructing left renal stone. Incidental note is also made of a 1.5 x 1.8 cm spiculated appearing soft tissue nodule in the right upper lobe. 1. Acute appearing fracture involving the superior endplate of the Y04 vertebral body. No evidence of retropulsion. 2.  Incidental note is made of a 1.5 x 1.8 cm soft tissue pulmonary nodule in the right upper lobe. Recommend prompt noncontrast chest CT for further evaluation. CT LUMBAR SPINE WO CONTRAST    Result Date: 1/16/2023  EXAMINATION: CT OF THE LUMBAR SPINE WITHOUT CONTRAST  1/16/2023 TECHNIQUE: CT of the lumbar spine was performed without the administration of intravenous contrast. Multiplanar reformatted images are provided for review. Adjustment of mA and/or kV according to patient size was utilized. Automated exposure control, iterative reconstruction, and/or weight based adjustment of the mA/kV was utilized to reduce the radiation dose to as low as reasonably achievable. COMPARISON: MRI lumbar spine dated 24 May 2022 HISTORY: ORDERING SYSTEM PROVIDED HISTORY: pain, concern for fx TECHNOLOGIST PROVIDED HISTORY: Reason for exam:->pain, concern for fx Decision Support Exception - unselect if not a suspected or confirmed emergency medical condition->Emergency Medical Condition (MA) Reason for Exam: pt stating having back spasms, NKI, sharp thoracic and lumbar pain, rt side worse side. Relevant Medical/Surgical History: no hx back sx FINDINGS: BONES/ALIGNMENT: There is normal alignment of the spine.  The vertebral body heights are maintained. No osseous destructive lesion is seen. DEGENERATIVE CHANGES: No significant degenerative changes of the lumbar spine. SOFT TISSUES/RETROPERITONEUM: No paraspinal mass is seen. Unremarkable non-contrast CT of the lumbar spine. No results found. PROCEDURES   Unless otherwise noted below, none     Procedures    CRITICAL CARE TIME (.cctime)       PAST MEDICAL HISTORY      has a past medical history of Allergic rhinitis, cause unspecified, Cancer ([address] Osteoporosis, unspecified, Postmenopausal, Pure hypercholesterolemia, and Unspecified acute reaction to stress. Chronic Conditions affecting Care:     EMERGENCY DEPARTMENT COURSE and DIFFERENTIAL DIAGNOSIS/MDM:   Vitals:    Vitals:    01/16/23 1811 01/16/23 1812 01/16/23 2004   BP: (!) 178/100  (!) 165/90   Pulse: 83  80   Resp: 18  18   Temp:  98.2 °F (36.8 °C)    TempSrc: Oral     SpO2: 98%  99%       Patient was given the following medications:  Medications   oxyCODONE-acetaminophen (PERCOCET) 5-325 MG per tablet 1 tablet (1 tablet Oral Given 1/16/23 2004)             Is this patient to be included in the SEP-1 Core Measure due to severe sepsis or septic shock? No   Exclusion criteria - the patient is NOT to be included for SEP-1 Core Measure due to: Infection is not suspected    CONSULTS: (Who and What was discussed)  IP CONSULT TO NEUROSURGERY  Discussion with Other Profesionals : None    Social Determinants : None    Records Reviewed : None    CC/HPI Summary, DDx, ED Course, and Reassessment: Patient presented to the emerged from today with complaints of back pain after coughing fits. Patient concern for possible fracture because she states that she has osteoporosis. She did have some mild diffuse tenderness on exam I did do CTs of the lumbar and thoracic spine given her complaints and history, CT did show an actual fracture of T10 superior endplate.   I did contact neurosurgery who recommended TLSO brace, patient was given a prescription and discharged home, instructed to follow-up as an outpatient. Patient verbalized understanding of the plan and agreed with it        I am the Primary Clinician of Record. FINAL IMPRESSION      1. Other closed fracture of tenth thoracic vertebra, initial encounter [facility])          DISPOSITION/PLAN     DISPOSITION Decision to Discharge    PATIENT REFERRED TO:  [facility]  [place] [address] [address] [place] [address]  [phone]  Call   For follow up      [address] [place]:  Discharge Medication List as of 1/16/2023  8:54 PM        START taking these medications    Details   HYDROcodone-acetaminophen (NORCO) 5-325 MG per tablet Take 1 tablet by mouth every 6 hours as needed for Pain for up to 3 days. Intended supply: 3 days.  Take lowest dose possible to manage pain Max Daily Amount: 4 tablets, Disp-12 tablet, R-0Normal             DISCONTINUED MEDICATIONS:  Discharge Medication List as of 1/16/2023  8:54 PM                 (Please note that portions of this note were completed with a voice recognition program.  Efforts were made to edit the dictations but occasionally words are mis-transcribed.)    Marcela Overall, APRN - CNP (electronically signed)      Marcela Muniz, APRN - CNP  01/17/23 5633

## 2023-01-26 ENCOUNTER — TELEPHONE (OUTPATIENT)
Dept: CASE MANAGEMENT | Age: 68
End: 2023-01-26

## 2023-01-26 DIAGNOSIS — R91.1 LUNG NODULE SEEN ON IMAGING STUDY: Primary | ICD-10-CM

## 2023-01-26 NOTE — TELEPHONE ENCOUNTER
Imaging report CT Thoracic Spine 1/16/23 with f/u imaging recommendations    Incidental note is made of a 1.5 x 1.8 cm soft tissue pulmonary nodule in   the right upper lobe. Recommend prompt noncontrast chest CT for further   evaluation. Pulmonlogy Referral pended to chart should you choose to sign. Please choose either Geetha Taveras or [facility] office and delete the other.     Amy Ellsworth  Lung Navigation VANESSA(CAMPOS)

## 2023-01-30 ENCOUNTER — HOSPITAL ENCOUNTER (OUTPATIENT)
Dept: CT IMAGING | Age: 68
Discharge: HOME | End: 2023-01-30
Payer: MEDICARE

## 2023-01-30 ENCOUNTER — TELEPHONE (OUTPATIENT)
Dept: CASE MANAGEMENT | Age: 68
End: 2023-01-30

## 2023-01-30 ENCOUNTER — TELEPHONE (OUTPATIENT)
Dept: FAMILY MEDICINE CLINIC | Age: 68
End: 2023-01-30

## 2023-01-30 DIAGNOSIS — R91.1 LUNG NODULE: Primary | ICD-10-CM

## 2023-01-30 DIAGNOSIS — R91.1 LUNG NODULE SEEN ON IMAGING STUDY: ICD-10-CM

## 2023-01-30 PROCEDURE — 71250 CT THORAX DX C-: CPT

## 2023-01-30 NOTE — TELEPHONE ENCOUNTER
Is the pt aware of this? Did the ER NP review this finding with her? Please call pt and inform her of this. Incidental note is made of a 1.5 x 1.8 cm soft tissue pulmonary nodule in   the right upper lobe. Ordered f/u CT ASAP.  If they are unable to accommodate in the next few days, will order STAT

## 2023-01-30 NOTE — TELEPHONE ENCOUNTER
Called patient, I informed her that Dr Sin Smith is currently out of the office. I told her that a referral was placed and that she will be contacted regarding next steps. Patient had questions regarding the meaning of recent CT result. I informed her that the scan had shown an irregularity with the nodule that needs further review by the specialists and that is why a referral was placed. Patient verbalized understanding.

## 2023-01-30 NOTE — TELEPHONE ENCOUNTER
CT Chest 1/30/23 with f/u imaging recommendations    Dominant irregular nodule right upper lobe. Given its irregular appearance,   consider PET-CT to rule out metabolic activity     Pulmonlogy Referral pended to chart should you choose to sign. Please choose either [facility]ufus Rayo or [place] office and delete the other.      Suzie Olivas  Lung Navigation R.T.(R)

## 2023-01-30 NOTE — TELEPHONE ENCOUNTER
Patient calling in needing her results for the CT chest WO contrast. Please advise and call patient back

## 2023-01-30 NOTE — TELEPHONE ENCOUNTER
Referral signed by Dr Radha Brooks on behalf of Dr Jenna Carty. I called patient and informed her that referral was placed and that she will be contacted regarding next steps. Patient had questions regarding recent CT result. I informed her that the scan had shown an irregularity with the nodule that needs further review by the specialists and that is why a referral was placed. Patient verbalized understanding.

## 2023-01-30 NOTE — TELEPHONE ENCOUNTER
Called patient. Informed of pulmonary nodule. She states she was not told in the ED about the incidental finding.     Informed patient Dr Rojas is wanting the patient to have an additional CT scan.    Called central scheduling and [facility] states they can get the patient in this morning and to have her head over.    Informed patient, she is agreeable and will head over now.

## 2023-01-31 NOTE — TELEPHONE ENCOUNTER
1st provider opening is with Dr. Luke Hearn on 2/27/23. Is it OK for pt to wait this long to be scheduled?

## 2023-02-01 ENCOUNTER — TELEPHONE (OUTPATIENT)
Dept: PULMONOLOGY | Age: 68
End: 2023-02-01

## 2023-02-01 NOTE — TELEPHONE ENCOUNTER
Received a referral from Dr. Mary Chavez to be seen for Pulmonary Nodule. Please advise who will see patient and how soon they should be scheduled.

## 2023-02-02 ENCOUNTER — OFFICE VISIT (OUTPATIENT)
Dept: PULMONOLOGY | Age: 68
End: 2023-02-02
Payer: MEDICARE

## 2023-02-02 ENCOUNTER — TELEMEDICINE (OUTPATIENT)
Dept: FAMILY MEDICINE CLINIC | Age: 68
End: 2023-02-02
Payer: MEDICARE

## 2023-02-02 VITALS
TEMPERATURE: 97.9 F | HEIGHT: 64 IN | OXYGEN SATURATION: 94 % | DIASTOLIC BLOOD PRESSURE: 84 MMHG | HEART RATE: 86 BPM | WEIGHT: 108 LBS | RESPIRATION RATE: 16 BRPM | SYSTOLIC BLOOD PRESSURE: 132 MMHG | BODY MASS INDEX: 18.44 KG/M2

## 2023-02-02 DIAGNOSIS — M80.00XD OSTEOPOROSIS WITH CURRENT PATHOLOGICAL FRACTURE WITH ROUTINE HEALING, UNSPECIFIED OSTEOPOROSIS TYPE, SUBSEQUENT ENCOUNTER: Primary | ICD-10-CM

## 2023-02-02 DIAGNOSIS — J30.2 SEASONAL ALLERGIC RHINITIS, UNSPECIFIED TRIGGER: ICD-10-CM

## 2023-02-02 DIAGNOSIS — R91.1 LUNG NODULE: Primary | ICD-10-CM

## 2023-02-02 DIAGNOSIS — Z87.891 FORMER SMOKER: ICD-10-CM

## 2023-02-02 PROCEDURE — G8420 CALC BMI NORM PARAMETERS: HCPCS | Performed by: INTERNAL MEDICINE

## 2023-02-02 PROCEDURE — 3017F COLORECTAL CA SCREEN DOC REV: CPT | Performed by: INTERNAL MEDICINE

## 2023-02-02 PROCEDURE — 1123F ACP DISCUSS/DSCN MKR DOCD: CPT | Performed by: STUDENT IN AN ORGANIZED HEALTH CARE EDUCATION/TRAINING PROGRAM

## 2023-02-02 PROCEDURE — 3017F COLORECTAL CA SCREEN DOC REV: CPT | Performed by: STUDENT IN AN ORGANIZED HEALTH CARE EDUCATION/TRAINING PROGRAM

## 2023-02-02 PROCEDURE — G8484 FLU IMMUNIZE NO ADMIN: HCPCS | Performed by: STUDENT IN AN ORGANIZED HEALTH CARE EDUCATION/TRAINING PROGRAM

## 2023-02-02 PROCEDURE — 1090F PRES/ABSN URINE INCON ASSESS: CPT | Performed by: INTERNAL MEDICINE

## 2023-02-02 PROCEDURE — G8419 CALC BMI OUT NRM PARAM NOF/U: HCPCS | Performed by: STUDENT IN AN ORGANIZED HEALTH CARE EDUCATION/TRAINING PROGRAM

## 2023-02-02 PROCEDURE — G8399 PT W/DXA RESULTS DOCUMENT: HCPCS | Performed by: STUDENT IN AN ORGANIZED HEALTH CARE EDUCATION/TRAINING PROGRAM

## 2023-02-02 PROCEDURE — 1036F TOBACCO NON-USER: CPT | Performed by: INTERNAL MEDICINE

## 2023-02-02 PROCEDURE — 99204 OFFICE O/P NEW MOD 45 MIN: CPT | Performed by: INTERNAL MEDICINE

## 2023-02-02 PROCEDURE — 1123F ACP DISCUSS/DSCN MKR DOCD: CPT | Performed by: INTERNAL MEDICINE

## 2023-02-02 PROCEDURE — 99213 OFFICE O/P EST LOW 20 MIN: CPT | Performed by: STUDENT IN AN ORGANIZED HEALTH CARE EDUCATION/TRAINING PROGRAM

## 2023-02-02 PROCEDURE — G8427 DOCREV CUR MEDS BY ELIG CLIN: HCPCS | Performed by: STUDENT IN AN ORGANIZED HEALTH CARE EDUCATION/TRAINING PROGRAM

## 2023-02-02 PROCEDURE — G8427 DOCREV CUR MEDS BY ELIG CLIN: HCPCS | Performed by: INTERNAL MEDICINE

## 2023-02-02 PROCEDURE — G8484 FLU IMMUNIZE NO ADMIN: HCPCS | Performed by: INTERNAL MEDICINE

## 2023-02-02 PROCEDURE — G8399 PT W/DXA RESULTS DOCUMENT: HCPCS | Performed by: INTERNAL MEDICINE

## 2023-02-02 PROCEDURE — 1090F PRES/ABSN URINE INCON ASSESS: CPT | Performed by: STUDENT IN AN ORGANIZED HEALTH CARE EDUCATION/TRAINING PROGRAM

## 2023-02-02 PROCEDURE — 1036F TOBACCO NON-USER: CPT | Performed by: STUDENT IN AN ORGANIZED HEALTH CARE EDUCATION/TRAINING PROGRAM

## 2023-02-02 SDOH — ECONOMIC STABILITY: INCOME INSECURITY: HOW HARD IS IT FOR YOU TO PAY FOR THE VERY BASICS LIKE FOOD, HOUSING, MEDICAL CARE, AND HEATING?: NOT HARD AT ALL

## 2023-02-02 SDOH — ECONOMIC STABILITY: TRANSPORTATION INSECURITY
IN THE PAST 12 MONTHS, HAS LACK OF TRANSPORTATION KEPT YOU FROM MEETINGS, WORK, OR FROM GETTING THINGS NEEDED FOR DAILY LIVING?: NO

## 2023-02-02 SDOH — ECONOMIC STABILITY: FOOD INSECURITY: WITHIN THE PAST 12 MONTHS, THE FOOD YOU BOUGHT JUST DIDN'T LAST AND YOU DIDN'T HAVE MONEY TO GET MORE.: NEVER TRUE

## 2023-02-02 SDOH — ECONOMIC STABILITY: FOOD INSECURITY: WITHIN THE PAST 12 MONTHS, YOU WORRIED THAT YOUR FOOD WOULD RUN OUT BEFORE YOU GOT MONEY TO BUY MORE.: NEVER TRUE

## 2023-02-02 NOTE — PROGRESS NOTES
MA Communication:   The following orders are received by verbal communication from Richmond Price MD    Orders include:  PET CT Scheduled 2/10/23 @ 1:15 PM with 12:45 PM arrival)        FU scheduled 2/14/23

## 2023-02-02 NOTE — PROGRESS NOTES
Pulmonary Outpatient Note   Heron Montes MD       2023    1. Lung nodule    2. Seasonal allergic rhinitis, unspecified trigger    3. Former smoker          ASSESSMENT/PLAN:  Lung nodule. Incidental.  Not a heavy smoker, wonder whether this could be mucus plugging with her recent respiratory episode. However the nodule is too large to be followed prospectively. Recommend PET/CT. She is agreeable. Seasonal allergic rhinitis. No symptoms at present. Symptoms are not particularly bothersome. Former smoker. Only 8-pack-year history of smoking in the distant past.  No symptoms suggestive of asthma/COPD, possible asthmatic bronchitis during respiratory tract infections  Prophylaxis. She received 2 doses of the COVID-19 vaccine. Recommend considering the new divalent COVID-19 vaccine. She denies the flu shot. Patient to check with her PCP regarding pneumonia vaccination      RTC with testing. Orders Placed This Encounter   Procedures    PET CT SKULL BASE TO MID THIGH       No follow-ups on file. Chief Complaint:   Pulmonary Nodule (New patient)       HPI: Irlanda Weir is a 79y.o. year old female here for evaluation of lung nodule. Her PCP is Dr. Lemuel Raines. Estelle Nunez is a pleasant 80-year-old female living with her  Jack Hicks in [facility]. He accompanies her to the visit today. 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 She smoked from age [age], about 1 pack/day, drinks very little alcohol. The patient's mother  of heart disease/CHF, father had COPD the patient has 2 brothers and 4 sisters, healthy to her knowledge. She has a son and a daughter, both healthy. The patient has a history of, lumbosacral spondylosis, SVT, allergic bleeding from her face, hyperlipidemia, depression, anxiety, mucositis. She has chronic pain, is followed by Dr. Lisa Leyva at [facility].     The patient had an episode of \"asthma\" with wheezing for about 2 years. She had used an inhaler at that time. Recently the patient contracted a respiratory infection, she thinks it could be RSV. Her daughter and daughter was family had similar symptoms before she got ill. The patient had significant cough, started getting back pain and question whether she had a hip fracture from her coughing. She was seen in the ER on 1/17/2023. CT of the spine showed a T10 superior endplate fracture, she was recommended TLSO. She was incidentally found to have right lower lobe lung nodule and underwent CT chest which confirmed the nodule. She was then referred to us. Regarding her allergic rhinitis, she has symptoms in the spring and fall, has no symptoms at present. She denies shortness of breath, her cough is improving. She has had a sore throat and sore mouth and burning of her tongue for years, was evaluated by Dr. Lucas Hines. She was placed on PPI for possible reflux, this does not appear to improve her oral symptoms. She thinks symptoms began almost 15 years ago, but have increased recently. She has had EGDs and colonoscopy for her back pain she uses half a tablet of Cymbalta, half a tablet of tramadol as needed. She did develop COVID-19 infection in August 2022, had lost weight. She weighed 112 pounds in the past.  She has had a retinal tear and bilateral cataract. She has had 2 basal cell carcinoma lesions to her face. Patient occasionally wakes up from sleep with palpitations. She denies recent constipation. CT chest 1/30/2023  Mediastinum: Thyroid gland is unremarkable. Atherosclerotic change seen in   aorta. No significant mediastinal hilar adenopathy. Mild coronary artery   calcification is seen       Lungs/pleura: No large blebs or bulla. No obstructing endobronchial lesions are seen. De pendant opacity is seen at   the lung bases, likely atelectasis. Subtle mosaic attenuation is seen,   suggesting small airways disease or air trapping. There is a dominant irregular noncalcified nodule the right upper lobe,   measuring 1.2 cm by 1.2 cm. This abuts the major fissure. Upper Abdomen: Adrenal glands unremarkable. 2 mm stone seen in left kidney       Soft Tissues/Bones: degenerative changes are seen in the spine. Post   kyphoplasty changes seen at T10           Impression   Dominant irregular nodule right upper lobe. Given its irregular appearance,   consider PET-CT to rule out metabolic activity       Mild coronary artery calcification       Tiny nonobstructing left renal stone       RECOMMENDATIONS:   *pulmonary management*                   Past Medical History:   Diagnosis Date    Allergic rhinitis, cause unspecified     Cancer ([facility] [address]     basal cell on face    Osteoporosis, unspecified     Postmenopausal     Pure hypercholesterolemia     Unspecified acute reaction to stress        Past Surgical History:   Procedure Laterality Date    EYE SURGERY Right        Social History     Tobacco Use    Smoking status: Former     Packs/day: 1.00     Years: 8.00     Pack years: 8.00     Types: Cigarettes     Quit date: 1982     Years since quittin.1     Passive exposure: Past    Smokeless tobacco: Never   Substance Use Topics    Alcohol use:  Yes     Alcohol/week: 0.0 standard drinks       Family History   Problem Relation Age of Onset    Heart Disease Mother     COPD Father          Current Outpatient Medications:     DULoxetine (CYMBALTA) 20 MG extended release capsule, TAKE 1 CAPSULE BY MOUTH DAILY, Disp: 30 capsule, Rfl: 3    traMADol-acetaminophen (ULTRACET) 37.5-325 MG per tablet, Ultracet 37.5-325 mg tablet, Disp: , Rfl:     alendronate (FOSAMAX) 70 MG tablet, Take 1 tablet by mouth every 7 days, Disp: 12 tablet, Rfl: 1    traZODone (DESYREL) 50 MG tablet, TAKE 1 TABLET BY MOUTH EVERY NIGHT AS NEEDED FOR SLEEP, Disp: 30 tablet, Rfl: 2    polyethylene glycol (GLYCOLAX) 17 g packet, Take 17 g by mouth daily as needed for Constipation, Disp: , Rfl:     Calcium 600-200 MG-UNIT TABS, Take by mouth 2 times daily, Disp: , Rfl:     Cholecalciferol (VITAMIN D3) 1.25 MG (39099 UT) CAPS, Take by mouth daily, Disp: , Rfl:     Ascorbic Acid (VITAMIN C) 250 MG tablet, Take 250 mg by mouth daily, Disp: , Rfl:     Biotin 1000 MCG TABS, Take by mouth daily, Disp: , Rfl:     Codeine    Vitals:    02/02/23 1301   BP: 132/84   Site: Left Upper Arm   Position: Sitting   Cuff Size: Medium Adult   Pulse: 86   Resp: 16   Temp: 97.9 °F (36.6 °C)   TempSrc: Temporal   SpO2: 94%   Weight: 108 lb (49 kg)   Height: 5' 4\" (1.626 m)       Review of Systems   Constitutional:  Positive for unexpected weight change. Negative for appetite change, chills, diaphoresis, fatigue and fever. HENT:  Positive for congestion and rhinorrhea. Negative for nosebleeds, postnasal drip, sinus pressure, sneezing, sore throat, trouble swallowing and voice change. Burning of her tongue and mouth   Eyes:  Negative for discharge, redness, itching and visual disturbance. Respiratory:  Positive for cough. Negative for apnea, choking, chest tightness, shortness of breath, wheezing and stridor. Cardiovascular:  Positive for palpitations (Wakes up at night with palpitations occasionally). Negative for chest pain and leg swelling. Gastrointestinal:  Negative for abdominal distention, abdominal pain, blood in stool, constipation, diarrhea, nausea and vomiting. Endocrine: Negative for polyuria. Genitourinary:  Negative for decreased urine volume, difficulty urinating, dysuria, enuresis, frequency, hematuria and urgency. Musculoskeletal:  Negative for arthralgias, back pain, gait problem, joint swelling and myalgias. Skin:  Negative for rash. Allergic/Immunologic: Negative for environmental allergies and immunocompromised state. Neurological:  Negative for dizziness, tremors, seizures, weakness, light-headedness and headaches. Hematological:  Does not bruise/bleed easily. Psychiatric/Behavioral:  Negative for agitation, behavioral problems, confusion, hallucinations and sleep disturbance. All other systems reviewed and are negative. Physical Exam  Vitals reviewed. Constitutional:       General: She is not in acute distress. Appearance: She is well-developed. She is not ill-appearing, toxic-appearing or diaphoretic. Comments: Very pleasant, petite   HENT:      Head: Normocephalic and atraumatic. Nose: Nose normal.      Mouth/Throat:      Mouth: Mucous membranes are moist.      Pharynx: Oropharynx is clear. No oropharyngeal exudate or posterior oropharyngeal erythema. Comments: ?  Geographic tongue, class II airway,  Eyes:      General: No scleral icterus. Right eye: No discharge. Left eye: No discharge. Conjunctiva/sclera: Conjunctivae normal.      Pupils: Pupils are equal, round, and reactive to light. Neck:      Thyroid: No thyromegaly. Vascular: No JVD. Trachea: No tracheal deviation. Cardiovascular:      Rate and Rhythm: Normal rate and regular rhythm. Heart sounds: Normal heart sounds. No murmur heard. No friction rub. No gallop. Pulmonary:      Effort: Pulmonary effort is normal. No respiratory distress. Breath sounds: Normal breath sounds. No stridor. No wheezing or rales. Abdominal:      Palpations: Abdomen is soft. Tenderness: There is no abdominal tenderness. There is no guarding or rebound. Musculoskeletal:      Right lower leg: No edema. Left lower leg: No edema. Lymphadenopathy:      Cervical: No cervical adenopathy. Skin:     General: Skin is warm and dry. Coloration: Skin is not jaundiced or pale. Findings: No bruising, erythema, lesion or rash. Neurological:      General: No focal deficit present. Mental Status: She is alert and oriented to person, place, and time.       Comments: Detailed neurologic exam not performed   Psychiatric:         Behavior: Behavior normal.         Thought Content:  Thought content normal.         Judgment: Judgment normal.

## 2023-02-02 NOTE — PROGRESS NOTES
Maury Biggs (:  1955) is a Established patient, here for evaluation of the following:    Assessment & Plan   Below is the assessment and plan developed based on review of pertinent history, physical exam, labs, studies, and medications. 1. Osteoporosis with current pathological fracture with routine healing, unspecified osteoporosis type, subsequent encounter  No follow-ups on file. Discussed the risks and benefits of bisphosphonate therapy. Did discuss that it would be encouraged her to keep taking vitamin D and calcium supplements, based on her T score it was very much recommended that she take the bisphosphonate, at least until she follows up with endocrinology to see if they have a better solution. Did discuss that she has been having some constipation, currently not taking anything for the constipation, we did discuss Metamucil at night and MiraLAX in the morning. Subjective   HPI    Osteoporosis  - had a horrible cough that caused her to cough so hard that she had a vertebral fracture  - her DEXA scan had a t score -3.7 on 22  - she was started on fosamax for the osteoporosis  - she took fosamax for 4-5 years and then stopped 4-5 years ago   - she stopped it because she had terrible stomach pains and they think it was related to the fosamax, she made a comment to the pharmacist and he said \"don't take that, don't ever take that again! \"  - she has been taking it again for the past 9 weeks  - has appointment on  with endocrinology to manage  - she is also on vitamins ([facility] for bone growth)             Objective   Patient-Reported Vitals  Patient-Reported Systolic (Top): 140 mmHg  Patient-Reported Diastolic (Bottom): 80 mmHg  Patient-Reported Weight: 106 lbs  Patient-Reported Height: 5'4       Physical Exam  Constitutional:       Appearance: Normal appearance. HENT:      Head: Atraumatic.       Right Ear: External ear normal.      Left Ear: External ear normal.      Nose: Nose normal.   Eyes:      Extraocular Movements: Extraocular movements intact. Pulmonary:      Effort: Pulmonary effort is normal.   Musculoskeletal:         General: Normal range of motion. Skin:     Coloration: Skin is not pale. Neurological:      General: No focal deficit present. Psychiatric:         Mood and Affect: Mood normal.            Louise Lucio was evaluated through a synchronous (real-time) audio-video encounter. The patient (or guardian if applicable) is aware that this is a billable service, which includes applicable co-pays. This Virtual Visit was conducted with patient's (and/or legal guardian's) consent. The visit was conducted pursuant to the emergency declaration under the [facility] [address] authority and the [employer] General Act. Patient identification was verified, and a caregiver was present when appropriate.    The patient was located at Home: [facility] [phone]  Provider was located at [facility] ([address] [place], [place] [address]): [address] [place] [address],  [address] [place] [address] [address]         --Lynn Carver MD

## 2023-02-05 ASSESSMENT — ENCOUNTER SYMPTOMS
ABDOMINAL PAIN: 0
SINUS PRESSURE: 0
BACK PAIN: 0
BLOOD IN STOOL: 0
EYE REDNESS: 0
ABDOMINAL DISTENTION: 0
VOMITING: 0
DIARRHEA: 0
WHEEZING: 0
CHOKING: 0
STRIDOR: 0
EYE ITCHING: 0
NAUSEA: 0
EYE DISCHARGE: 0
RHINORRHEA: 1
CONSTIPATION: 0
SORE THROAT: 0
TROUBLE SWALLOWING: 0
APNEA: 0
COUGH: 1
VOICE CHANGE: 0
CHEST TIGHTNESS: 0
SHORTNESS OF BREATH: 0

## 2023-02-09 ENCOUNTER — TELEPHONE (OUTPATIENT)
Dept: FAMILY MEDICINE CLINIC | Age: 68
End: 2023-02-09

## 2023-02-13 ENCOUNTER — TELEMEDICINE (OUTPATIENT)
Dept: FAMILY MEDICINE CLINIC | Age: 68
End: 2023-02-13
Payer: MEDICARE

## 2023-02-13 ENCOUNTER — TELEPHONE (OUTPATIENT)
Dept: PULMONOLOGY | Age: 68
End: 2023-02-13

## 2023-02-13 DIAGNOSIS — M80.00XD OSTEOPOROSIS WITH CURRENT PATHOLOGICAL FRACTURE WITH ROUTINE HEALING, UNSPECIFIED OSTEOPOROSIS TYPE, SUBSEQUENT ENCOUNTER: Primary | ICD-10-CM

## 2023-02-13 PROCEDURE — G8427 DOCREV CUR MEDS BY ELIG CLIN: HCPCS | Performed by: FAMILY MEDICINE

## 2023-02-13 PROCEDURE — G8484 FLU IMMUNIZE NO ADMIN: HCPCS | Performed by: FAMILY MEDICINE

## 2023-02-13 PROCEDURE — G8420 CALC BMI NORM PARAMETERS: HCPCS | Performed by: FAMILY MEDICINE

## 2023-02-13 PROCEDURE — 1090F PRES/ABSN URINE INCON ASSESS: CPT | Performed by: FAMILY MEDICINE

## 2023-02-13 PROCEDURE — 1036F TOBACCO NON-USER: CPT | Performed by: FAMILY MEDICINE

## 2023-02-13 PROCEDURE — 99213 OFFICE O/P EST LOW 20 MIN: CPT | Performed by: FAMILY MEDICINE

## 2023-02-13 PROCEDURE — 3017F COLORECTAL CA SCREEN DOC REV: CPT | Performed by: FAMILY MEDICINE

## 2023-02-13 PROCEDURE — 1123F ACP DISCUSS/DSCN MKR DOCD: CPT | Performed by: FAMILY MEDICINE

## 2023-02-13 PROCEDURE — G8399 PT W/DXA RESULTS DOCUMENT: HCPCS | Performed by: FAMILY MEDICINE

## 2023-02-13 RX ORDER — MELOXICAM 15 MG/1
TABLET ORAL
COMMUNITY
Start: 2023-02-07

## 2023-02-13 NOTE — PROGRESS NOTES
Joann Romo (:  1955) is a Established patient, here for evaluation of the following:    Assessment & Plan   Below is the assessment and plan developed based on review of pertinent history, physical exam, labs, studies, and medications. 1. Osteoporosis with current pathological fracture with routine healing, unspecified osteoporosis type, subsequent encounter  Discussed bisphosphonate medications, including dosing, benefits, risks, and side effects. We reviewed in detail the mechanism of action in the body - discussed osteoclasts and osteoblasts and their roles in the body and in treatment of osteoporosis. Discussed follow up DEXA would be recommended in 1-2 years after starting the medication. Discussed at this point risks of osteoporosis outweigh the risks of the medication. She is agreeable to continuing on it for now. If no improvement on repeat DEXA, or if she develops side effects from Fosamax, would recommend changing to Prolia. Adequate calcium and vitamin D, exercise, fall prevention, and avoidance of heavy alcohol use are all recommended. 1200 mg of elemental calcium daily, total diet plus supplement if needed, and 800 international units of vitamin D daily recommended. Return for follow up with Dr. Shira Grossman - would like fibromyalgia evaluation (in person). Subjective   Chief Complaint   Patient presents with    Osteoporosis     Discuss Fosamax, states she fractured her T10       HPI Osteoporosis:  Current pharmacologic therapy and date started Fosamax (alendronate)- began 3 months ago . Had been on it about 4 years prior taking if for about 4 years, then came off because she thought it may have caused abdominal pain. Medication side effects: none currently, has not had abdominal pain. Last DXA:  2022-  T score at spine -3.7 and lowest hip -3.0, which was worse compared to previous scan. Current calcium intake is at least 1200 mg/day from diet and supplements: yes. She is currently taking vitamin D, states she is taking \"[employer]\" supplement. Regular weight-bearing exercise: no - she states she was advised to hold off on this by the person that did her recent kyphoplasty after a T10 compression fracture sustained while coughing hard from a URI. She is anxious about taking Fosamax as she has \"heard so many bad things about it\". She is concerned about adverse effects of the medication. She has several questions about the medication works in the body. Vit D, 25-Hydroxy (ng/mL)   Date Value   12/01/2022 58.0   Lab Review   No visits with results within 2 Month(s) from this visit.    Latest known visit with results is:   Orders Only on 12/01/2022   Component Date Value    TSH 12/01/2022 0.95     Phosphorus 12/01/2022 4.1     Magnesium 12/01/2022 2.40     Vit D, 25-Hydroxy 12/01/2022 58.0          Review of Systems   Musculoskeletal:         Reports body pain, would like to be seen to discuss fibromyalgia        Objective   Patient-Reported Vitals  No data recorded     Physical Exam  [INSTRUCTIONS:  \"[x]\" Indicates a positive item  \"[]\" Indicates a negative item  -- DELETE ALL ITEMS NOT EXAMINED]    Constitutional: [x] Appears well-developed and well-nourished [x] No apparent distress      [] Abnormal -     Mental status: [x] Alert and awake  [] Oriented to person/place/time [x] Able to follow commands    [] Abnormal -     Eyes:   EOM    [x]  Normal    [] Abnormal -   Sclera  [x]  Normal    [] Abnormal -          Discharge [x]  None visible   [] Abnormal -     HENT: [x] Normocephalic, atraumatic  [] Abnormal -   [] Mouth/Throat: Mucous membranes are moist    External Ears [x] Normal  [] Abnormal -    Neck: [x] No visualized mass [] Abnormal -     Pulmonary/Chest: [x] Respiratory effort normal   [x] No visualized signs of difficulty breathing or respiratory distress        [] Abnormal -      Musculoskeletal:   [] Normal gait with no signs of ataxia         [] Normal range of motion of neck        [] Abnormal -     Neurological:        [x] No Facial Asymmetry (Cranial nerve 7 motor function) (limited exam due to video visit)          [] No gaze palsy        [] Abnormal -          Skin:        [x] No significant exanthematous lesions or discoloration noted on facial skin         [] Abnormal -            Psychiatric:       [x] Normal Affect [] Abnormal -        [] No Hallucinations    Other pertinent observable physical exam findings:-       Narrative   EXAMINATION:   BONE DENSITOMETRY       11/21/2022 1:11 pm       TECHNIQUE:   A bone density dual x-ray absorptiometry (DXA) scan was performed of the   lumbar spine and left hip on a [email or web address] System. COMPARISON:   None. HISTORY:   ORDERING SYSTEM PROVIDED HISTORY: Asymptomatic menopausal state       Gender: F       Age: 80 y/o       FINDINGS:   LUMBAR SPINE: L1-L4       BMD: 0.645 g/cm2       T-score: -3.7       Z-score: -1.7       LEFT TOTAL HIP:       BMD: 0.619 g/cm2       T-score: -2.6       Z-score: -1.3       LEFT FEMORAL NECK:   BMD: 0.518 g/cm2   T-score: -3.0       Z-score: -1.3       FRAX:       Not Indicated. Impression   Osteoporosis by WHO criteria. RECOMMENDATIONS:   1. All patients should optimize their calcium and vitamin D intake.        2. Consider FDA-approved medical therapies in postmenopausal women and men   aged 48 years and older, based on the following:       - A hip or vertebral (clinical or morphometric) fracture       - T-score less than or equal to -2.5 at the femoral neck or spine after   appropriate evaluation to exclude secondary causes       - Low bone density (T-score between -1.0 and -2.5 at the femoral neck or   spine) and a 10-year probability of a hip fracture greater than or equal to   3% or a 10-year probability of a major osteoporosis-related fracture greater   than or equal to 20% based on FRAX calculation.       - Clinician judgment and/or patient preferences may indicate treatment for   people with 10-year fracture probabilities above or below these levels       - Further guidance on treatment can be found at the [facility] Osteoporosis   Foundation's website bonesource.org.       3. Patients with diagnosis of osteoporosis or at high risk for fracture   should have regular bone mineral density tests. For patients eligible for   Medicare, routine testing is allowed once every 2 years. The testing   frequency can be increased to one year for patients who have rapidly   progressing disease, those who are receiving or discontinuing medical therapy   to restore bone mass or have additional risk factors. Template code:  [id number]_dxa             On this date 2/13/2023 I have spent 27 minutes reviewing previous notes, test results and face to face (virtual) with the patient discussing the diagnosis and importance of compliance with the treatment plan as well as documenting on the day of the visit. Oseas Munoz, was evaluated through a synchronous (real-time) audio-video encounter. The patient (or guardian if applicable) is aware that this is a billable service, which includes applicable co-pays. This Virtual Visit was conducted with patient's (and/or legal guardian's) consent. The visit was conducted pursuant to the emergency declaration under the [address], [facility] authority and the [employer] and [employer] General Act. Patient identification was verified, and a caregiver was present when appropriate.    The patient was located at Home: [place] [phone]  Provider was located at [facility] ([address] [place], [place] [address]): [phone]  [place],  [address] [facility] [address]         --Nica Blandon MD

## 2023-02-13 NOTE — TELEPHONE ENCOUNTER
Pt. CT Pet had to be RS as scanner was down. It is now 2/24/23. Pt. FU moved to 3/13. Advised pt to call on 27 to see if I have had any cancellations. I will also keep her name out so I can watch for a sooner hesham.

## 2023-02-15 ENCOUNTER — TELEPHONE (OUTPATIENT)
Dept: FAMILY MEDICINE CLINIC | Age: 68
End: 2023-02-15

## 2023-02-15 NOTE — TELEPHONE ENCOUNTER
Received phone call from the pt in regards to she is having PET scan and it is giving her a lot of anxiety and pt stated that she is asking for something for her anxiety.  Pt stated that her test is scheduled for 02/24/2023 please advise    Good call back #: [phone]

## 2023-02-16 DIAGNOSIS — F32.A ANXIETY AND DEPRESSION: Primary | ICD-10-CM

## 2023-02-16 DIAGNOSIS — F41.9 ANXIETY AND DEPRESSION: Primary | ICD-10-CM

## 2023-02-16 RX ORDER — LORAZEPAM 0.5 MG/1
TABLET ORAL
Qty: 20 TABLET | Refills: 0 | Status: SHIPPED | OUTPATIENT
Start: 2023-02-16 | End: 2023-02-23

## 2023-02-17 ENCOUNTER — HOSPITAL ENCOUNTER (OUTPATIENT)
Dept: PET IMAGING | Age: 68
Discharge: HOME | End: 2023-02-17

## 2023-02-17 NOTE — TELEPHONE ENCOUNTER
CT Pet for [facility] [place] called pt. Yesterday and had a opening for 2/17. Pt. Went there to have it done and the camera broke. Tried to get her in anywhere else today without success. Then could not put her back on for Pt. Is now going to go with MIKA[facility]. Pet Scan at [place]. They will call her with a date and time and then let me know when it will be.

## 2023-02-26 ASSESSMENT — ENCOUNTER SYMPTOMS
SHORTNESS OF BREATH: 0
DIARRHEA: 0
WHEEZING: 0
CONSTIPATION: 0
STRIDOR: 0
COUGH: 1
BACK PAIN: 0
ABDOMINAL PAIN: 0
ABDOMINAL DISTENTION: 0
EYE ITCHING: 0
SINUS PRESSURE: 0
VOMITING: 0
APNEA: 0
EYE REDNESS: 0
CHOKING: 0
TROUBLE SWALLOWING: 0
VOICE CHANGE: 0
EYE DISCHARGE: 0
CHEST TIGHTNESS: 0
BLOOD IN STOOL: 0
SORE THROAT: 0
NAUSEA: 0

## 2023-02-26 NOTE — PROGRESS NOTES
Pulmonary Outpatient Note   Corrine Tesfaye MD       2/27/2023    1. Lung nodule    2. Abnormal PET scan of lung    3. Peripheral polyneuropathy    4. Perennial allergic rhinitis    5. Former smoker          ASSESSMENT/PLAN:  Lung nodule, abnormal PET/CT. Incidental.  Not a heavy smoker, reviewed PET/CT images with the patient and her . The incidental dominant nodule has decreased in size, has very low SUV and is very unlikely to be malignancy. The cluster of nodules in the right middle lobe shows increased uptake, the short onset suggests an inflammatory/infectious process as interpreted by radiology. I suggest repeat CT in 6 months, she is agreeable. Seasonal allergic rhinitis. She is taking Claritin or Claritin-D, has a sensation of phlegm in her throat. I have suggested Singulair, prescription sent. Peripheral neuropathy. The patient has been a sensation of \"swelling of her soles\", she feels an abnormal sensation when she walks. Suspect she could have peripheral neuropathy, but she also had a vertebral fracture and could have compression. I told her to discuss this with Dr. Lorri Abernathy. Former smoker. Only 8-pack-year history of smoking in the distant past.  No symptoms suggestive of asthma/COPD, possible asthmatic bronchitis during respiratory tract infections. May be benefited with Singulair  Prophylaxis. She received 2 doses of the COVID-19 vaccine. Recommend considering the new bivalent COVID-19 vaccine. She denies the flu shot. Patient to check with her PCP regarding pneumonia vaccination      RTC 6 months with CT chest, call earlier if symptomatic with increased cough, shortness of breath, fever, unexplained weight loss. Orders Placed This Encounter   Procedures    CT CHEST WO CONTRAST       No follow-ups on file. Chief Complaint:   Follow-up (Lung nodule) and Results (CT PET)       HPI: Zayda Parham is a 76y.o. year old female here for follow-up for lung nodule. Her PCP is Dr. Clarence Edwards. Camryn Ballesteros presents for follow-up after completing PET-CT. She is accompanied by her . The patient says she still has a tickle in her throat and occasional cough. She denies wheezing, shortness of breath. She eats well, but is unable to gain weight. She is scared to cough hard due to prior vertebral fractures. She has started on treatment for osteoporosis, is due to see Dr. Delaney Garcia. She does not want to remain on the alendronate preferably. She asked me whether she can restart meloxicam.  The patient has a sensation of the soles of her feet being swollen, feels weird when she is walking. She denies tingling and numbness, suspects that the sensation in her soles is decreased. She denies GI or  complaints. The rest of her ROS was negative. Previous notes reviewed and edited as necessary. Camryn Ballesteros is a pleasant 49-year-old female living with her  Seleta Osler in [facility]. He accompanies her to the visit today. 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 She smoked from age [age], about 1 pack/day, drinks very little alcohol. The patient's mother  of heart disease/CHF, father had COPD the patient has 2 brothers and 4 sisters, healthy to her knowledge. She has a son and a daughter, both healthy. The patient has a history of, lumbosacral spondylosis, SVT, allergic bleeding from her face, hyperlipidemia, depression, anxiety, mucositis. She has chronic pain, is followed by Dr. Homero Odell at [facility]. The patient had an episode of \"asthma\" with wheezing for about 2 years. She had used an inhaler at that time. Recently the patient contracted a respiratory infection, she thinks it could be RSV. Her daughter and daughter was family had similar symptoms before she got ill. The patient had significant cough, started getting back pain and question whether she had a hip fracture from her coughing.   She was seen in the ER on 1/17/2023. CT of the spine showed a T10 superior endplate fracture, she was recommended TLSO. She was incidentally found to have right lower lobe lung nodule and underwent CT chest which confirmed the nodule. She was then referred to us. Regarding her allergic rhinitis, she has symptoms in the spring and fall, has no symptoms at present. She denies shortness of breath, her cough is improving. She has had a sore throat and sore mouth and burning of her tongue for years, was evaluated by Dr. Ventura Chang. She was placed on PPI for possible reflux, this does not appear to improve her oral symptoms. She thinks symptoms began almost 15 years ago, but have increased recently. She has had EGDs and colonoscopy for her back pain she uses half a tablet of Cymbalta, half a tablet of tramadol as needed. She did develop COVID-19 infection in August 2022, had lost weight. She weighed 112 pounds in the past.  She has had a retinal tear and bilateral cataract. She has had 2 basal cell carcinoma lesions to her face. Patient occasionally wakes up from sleep with palpitations. She denies recent constipation. CT chest 1/30/2023  Mediastinum: Thyroid gland is unremarkable. Atherosclerotic change seen in   aorta. No significant mediastinal hilar adenopathy. Mild coronary artery   calcification is seen       Lungs/pleura: No large blebs or bulla. No obstructing endobronchial lesions are seen. De pendant opacity is seen at   the lung bases, likely atelectasis. Subtle mosaic attenuation is seen,   suggesting small airways disease or air trapping. There is a dominant irregular noncalcified nodule the right upper lobe,   measuring 1.2 cm by 1.2 cm. This abuts the major fissure. Upper Abdomen: Adrenal glands unremarkable. 2 mm stone seen in left kidney       Soft Tissues/Bones: degenerative changes are seen in the spine.   Post   kyphoplasty changes seen at T10           Impression   Dominant irregular nodule right upper lobe. Given its irregular appearance,   consider PET-CT to rule out metabolic activity       Mild coronary artery calcification       Tiny nonobstructing left renal stone       RECOMMENDATIONS:   *pulmonary management*                   Past Medical History:   Diagnosis Date    Allergic rhinitis, cause unspecified     Cancer ([facility] [address]     basal cell on face    Osteoporosis, unspecified     Postmenopausal     Pure hypercholesterolemia     Unspecified acute reaction to stress        Past Surgical History:   Procedure Laterality Date    EYE SURGERY Right        Social History     Tobacco Use    Smoking status: Former     Packs/day: 1.00     Years: 8.00     Pack years: 8.00     Types: Cigarettes     Quit date: 1982     Years since quittin.1     Passive exposure: Past    Smokeless tobacco: Never   Substance Use Topics    Alcohol use:  Yes     Alcohol/week: 0.0 standard drinks       Family History   Problem Relation Age of Onset    Heart Disease Mother     COPD Father          Current Outpatient Medications:     montelukast (SINGULAIR) 10 MG tablet, Take 1 tablet by mouth nightly, Disp: 30 tablet, Rfl: 3    DULoxetine (CYMBALTA) 20 MG extended release capsule, TAKE 1 CAPSULE BY MOUTH DAILY, Disp: 30 capsule, Rfl: 3    traMADol-acetaminophen (ULTRACET) 37.5-325 MG per tablet, Ultracet 37.5-325 mg tablet, Disp: , Rfl:     alendronate (FOSAMAX) 70 MG tablet, Take 1 tablet by mouth every 7 days, Disp: 12 tablet, Rfl: 1    traZODone (DESYREL) 50 MG tablet, TAKE 1 TABLET BY MOUTH EVERY NIGHT AS NEEDED FOR SLEEP (Patient taking differently: Takes 1/2 nightly), Disp: 30 tablet, Rfl: 2    Calcium 600-200 MG-UNIT TABS, Take by mouth 2 times daily, Disp: , Rfl:     Cholecalciferol (VITAMIN D3) 1.25 MG (38003 UT) CAPS, Take by mouth daily, Disp: , Rfl:     Ascorbic Acid (VITAMIN C) 250 MG tablet, Take 250 mg by mouth daily, Disp: , Rfl:     Biotin 1000 MCG TABS, Take by mouth daily, Disp: , Rfl:     meloxicam (MOBIC) 15 MG tablet, TAKE 1 TABLET BY MOUTH EVERY DAY (Patient not taking: Reported on 2/27/2023), Disp: , Rfl:     polyethylene glycol (GLYCOLAX) 17 g packet, Take 17 g by mouth daily as needed for Constipation (Patient not taking: Reported on 2/27/2023), Disp: , Rfl:     Codeine    Vitals:    02/27/23 1529   BP: (!) 148/84   Pulse: (!) 103   Resp: 16   Temp: 97.6 °F (36.4 °C)   TempSrc: Temporal   SpO2: 100%   Weight: 105 lb 9.6 oz (47.9 kg)   Height: 5' 4\" (1.626 m)       Review of Systems   Constitutional:  Positive for unexpected weight change. Negative for appetite change, chills, diaphoresis, fatigue and fever. HENT:  Positive for congestion and postnasal drip. Negative for nosebleeds, rhinorrhea, sinus pressure, sneezing, sore throat, trouble swallowing and voice change. Burning of her tongue and mouth   Eyes:  Negative for discharge, redness, itching and visual disturbance. Respiratory:  Positive for cough. Negative for apnea, choking, chest tightness, shortness of breath, wheezing and stridor. Cardiovascular:  Negative for chest pain, palpitations and leg swelling. Gastrointestinal:  Negative for abdominal distention, abdominal pain, blood in stool, constipation, diarrhea, nausea and vomiting. Endocrine: Negative for polyuria. Genitourinary:  Negative for decreased urine volume, difficulty urinating, dysuria, enuresis, frequency, hematuria and urgency. Musculoskeletal:  Negative for arthralgias, back pain, gait problem, joint swelling and myalgias. Skin:  Negative for rash. Allergic/Immunologic: Negative for environmental allergies and immunocompromised state. Neurological:  Negative for dizziness, tremors, seizures, weakness, light-headedness and headaches. ?  Decreased sensation in soles of feet   Hematological:  Does not bruise/bleed easily. Psychiatric/Behavioral:  Negative for agitation, behavioral problems, confusion, hallucinations and sleep disturbance.     All other systems reviewed and are negative. Physical Exam  Vitals reviewed. Constitutional:       General: She is not in acute distress. Appearance: She is well-developed. She is not ill-appearing, toxic-appearing or diaphoretic. Comments: Very pleasant, petite   HENT:      Head: Normocephalic and atraumatic. Nose: Nose normal.      Mouth/Throat:      Mouth: Mucous membranes are moist.      Pharynx: Oropharynx is clear. No oropharyngeal exudate or posterior oropharyngeal erythema. Comments: ?  Geographic tongue, class II airway, dental repair  Eyes:      General: No scleral icterus. Right eye: No discharge. Left eye: No discharge. Conjunctiva/sclera: Conjunctivae normal.      Pupils: Pupils are equal, round, and reactive to light. Neck:      Thyroid: No thyromegaly. Vascular: No JVD. Trachea: No tracheal deviation. Cardiovascular:      Rate and Rhythm: Normal rate and regular rhythm. Heart sounds: Normal heart sounds. No murmur heard. No friction rub. No gallop. Pulmonary:      Effort: Pulmonary effort is normal. No respiratory distress. Breath sounds: Normal breath sounds. No stridor. No wheezing, rhonchi or rales. Musculoskeletal:      Right lower leg: No edema. Left lower leg: No edema. Lymphadenopathy:      Cervical: Cervical adenopathy (Small lymph node in neck, she says this has been present for 5 years) present. Skin:     General: Skin is warm and dry. Coloration: Skin is not jaundiced or pale. Findings: No bruising, erythema, lesion or rash. Neurological:      General: No focal deficit present. Mental Status: She is alert and oriented to person, place, and time.       Comments: Detailed neurologic exam not performed   Psychiatric:         Mood and Affect: Mood normal.         Behavior: Behavior normal.         Judgment: Judgment normal.

## 2023-02-27 ENCOUNTER — OFFICE VISIT (OUTPATIENT)
Dept: PULMONOLOGY | Age: 68
End: 2023-02-27
Payer: MEDICARE

## 2023-02-27 VITALS
WEIGHT: 105.6 LBS | DIASTOLIC BLOOD PRESSURE: 84 MMHG | BODY MASS INDEX: 18.03 KG/M2 | SYSTOLIC BLOOD PRESSURE: 148 MMHG | OXYGEN SATURATION: 100 % | HEART RATE: 103 BPM | RESPIRATION RATE: 16 BRPM | TEMPERATURE: 97.6 F | HEIGHT: 64 IN

## 2023-02-27 DIAGNOSIS — R94.2 ABNORMAL PET SCAN OF LUNG: ICD-10-CM

## 2023-02-27 DIAGNOSIS — G62.9 PERIPHERAL POLYNEUROPATHY: ICD-10-CM

## 2023-02-27 DIAGNOSIS — R91.1 LUNG NODULE: Primary | ICD-10-CM

## 2023-02-27 DIAGNOSIS — Z87.891 FORMER SMOKER: ICD-10-CM

## 2023-02-27 DIAGNOSIS — J30.89 PERENNIAL ALLERGIC RHINITIS: ICD-10-CM

## 2023-02-27 PROCEDURE — 3017F COLORECTAL CA SCREEN DOC REV: CPT | Performed by: INTERNAL MEDICINE

## 2023-02-27 PROCEDURE — 1123F ACP DISCUSS/DSCN MKR DOCD: CPT | Performed by: INTERNAL MEDICINE

## 2023-02-27 PROCEDURE — 99213 OFFICE O/P EST LOW 20 MIN: CPT | Performed by: INTERNAL MEDICINE

## 2023-02-27 PROCEDURE — G8399 PT W/DXA RESULTS DOCUMENT: HCPCS | Performed by: INTERNAL MEDICINE

## 2023-02-27 PROCEDURE — G8427 DOCREV CUR MEDS BY ELIG CLIN: HCPCS | Performed by: INTERNAL MEDICINE

## 2023-02-27 PROCEDURE — G8419 CALC BMI OUT NRM PARAM NOF/U: HCPCS | Performed by: INTERNAL MEDICINE

## 2023-02-27 PROCEDURE — 1036F TOBACCO NON-USER: CPT | Performed by: INTERNAL MEDICINE

## 2023-02-27 PROCEDURE — 1090F PRES/ABSN URINE INCON ASSESS: CPT | Performed by: INTERNAL MEDICINE

## 2023-02-27 PROCEDURE — G8484 FLU IMMUNIZE NO ADMIN: HCPCS | Performed by: INTERNAL MEDICINE

## 2023-02-27 RX ORDER — DULOXETIN HYDROCHLORIDE 30 MG/1
CAPSULE, DELAYED RELEASE ORAL
COMMUNITY
Start: 2023-02-07 | End: 2023-02-27

## 2023-02-27 RX ORDER — MONTELUKAST SODIUM 10 MG/1
10 TABLET ORAL NIGHTLY
Qty: 30 TABLET | Refills: 3 | Status: SHIPPED | OUTPATIENT
Start: 2023-02-27

## 2023-02-27 ASSESSMENT — ENCOUNTER SYMPTOMS: RHINORRHEA: 0

## 2023-02-27 NOTE — PROGRESS NOTES
MA Communication:   The following orders are received by verbal communication from Jordon Prather MD    Orders include:  CT in 6 mo        FU 6 mo

## 2023-02-27 NOTE — PATIENT INSTRUCTIONS
Remember to bring a list of pulmonary medications and any CPAP or BiPAP machines to your next appointment with the office. Please keep all of your future appointments scheduled by [facility]monary office. Out of respect for other patients and providers, you may be asked to reschedule your appointment if you arrive later than your scheduled appointment time. Appointments cancelled less than 24hrs in advance will be considered a no show. Patients with three missed appointments within 1 year or four missed appointments within 2 years can be dismissed from the practice. Please be aware that our physicians are required to work in the Intensive Care Unit at [facility].  Your appointment may need to be rescheduled if they are designated to work during your appointment time. You may receive a survey regarding the care you received during your visit. Your input is valuable to us. We encourage you to complete and return your survey. We hope you will choose us in the future for your healthcare needs. Pt instructed of all future appointment dates & times, including radiology, labs, procedures & referrals. If procedures were scheduled preparation instructions provided. Instructions on future appointments with [facility] were given.

## 2023-02-28 ENCOUNTER — HOSPITAL ENCOUNTER (OUTPATIENT)
Dept: CT IMAGING | Age: 68
Discharge: HOME | End: 2023-02-28

## 2023-02-28 DIAGNOSIS — R91.1 LUNG NODULE: ICD-10-CM

## 2023-02-28 RX ORDER — MONTELUKAST SODIUM 10 MG/1
TABLET ORAL
Qty: 90 TABLET | OUTPATIENT
Start: 2023-02-28

## 2023-03-02 ENCOUNTER — OFFICE VISIT (OUTPATIENT)
Dept: FAMILY MEDICINE CLINIC | Age: 68
End: 2023-03-02
Payer: MEDICARE

## 2023-03-02 VITALS
SYSTOLIC BLOOD PRESSURE: 138 MMHG | TEMPERATURE: 97.8 F | WEIGHT: 107 LBS | DIASTOLIC BLOOD PRESSURE: 60 MMHG | HEIGHT: 64 IN | HEART RATE: 56 BPM | OXYGEN SATURATION: 100 % | BODY MASS INDEX: 18.27 KG/M2

## 2023-03-02 DIAGNOSIS — S22.000A COMPRESSION FRACTURE OF BODY OF THORACIC VERTEBRA (HCC): ICD-10-CM

## 2023-03-02 DIAGNOSIS — R20.9 ALTERED SENSATION OF FOOT: Primary | ICD-10-CM

## 2023-03-02 DIAGNOSIS — F33.1 MODERATE EPISODE OF RECURRENT MAJOR DEPRESSIVE DISORDER (HCC): ICD-10-CM

## 2023-03-02 DIAGNOSIS — I47.1 SVT (SUPRAVENTRICULAR TACHYCARDIA) (HCC): ICD-10-CM

## 2023-03-02 PROBLEM — M46.1 SACROILIITIS, NOT ELSEWHERE CLASSIFIED (HCC): Status: ACTIVE | Noted: 2020-09-24

## 2023-03-02 PROBLEM — M81.0 SENILE OSTEOPOROSIS: Status: ACTIVE | Noted: 2022-12-08

## 2023-03-02 PROCEDURE — 1123F ACP DISCUSS/DSCN MKR DOCD: CPT | Performed by: FAMILY MEDICINE

## 2023-03-02 PROCEDURE — 1036F TOBACCO NON-USER: CPT | Performed by: FAMILY MEDICINE

## 2023-03-02 PROCEDURE — G8399 PT W/DXA RESULTS DOCUMENT: HCPCS | Performed by: FAMILY MEDICINE

## 2023-03-02 PROCEDURE — G8427 DOCREV CUR MEDS BY ELIG CLIN: HCPCS | Performed by: FAMILY MEDICINE

## 2023-03-02 PROCEDURE — 1090F PRES/ABSN URINE INCON ASSESS: CPT | Performed by: FAMILY MEDICINE

## 2023-03-02 PROCEDURE — 99214 OFFICE O/P EST MOD 30 MIN: CPT | Performed by: FAMILY MEDICINE

## 2023-03-02 PROCEDURE — 3017F COLORECTAL CA SCREEN DOC REV: CPT | Performed by: FAMILY MEDICINE

## 2023-03-02 PROCEDURE — G8484 FLU IMMUNIZE NO ADMIN: HCPCS | Performed by: FAMILY MEDICINE

## 2023-03-02 PROCEDURE — G8419 CALC BMI OUT NRM PARAM NOF/U: HCPCS | Performed by: FAMILY MEDICINE

## 2023-03-02 RX ORDER — DULOXETIN HYDROCHLORIDE 30 MG/1
30 CAPSULE, DELAYED RELEASE ORAL DAILY
COMMUNITY
End: 2023-03-06 | Stop reason: SDUPTHER

## 2023-03-02 ASSESSMENT — PATIENT HEALTH QUESTIONNAIRE - PHQ9
10. IF YOU CHECKED OFF ANY PROBLEMS, HOW DIFFICULT HAVE THESE PROBLEMS MADE IT FOR YOU TO DO YOUR WORK, TAKE CARE OF THINGS AT HOME, OR GET ALONG WITH OTHER PEOPLE: 0
2. FEELING DOWN, DEPRESSED OR HOPELESS: 1
9. THOUGHTS THAT YOU WOULD BE BETTER OFF DEAD, OR OF HURTING YOURSELF: 0
SUM OF ALL RESPONSES TO PHQ9 QUESTIONS 1 & 2: 2
SUM OF ALL RESPONSES TO PHQ QUESTIONS 1-9: 5
SUM OF ALL RESPONSES TO PHQ QUESTIONS 1-9: 5
7. TROUBLE CONCENTRATING ON THINGS, SUCH AS READING THE NEWSPAPER OR WATCHING TELEVISION: 0
SUM OF ALL RESPONSES TO PHQ QUESTIONS 1-9: 5
6. FEELING BAD ABOUT YOURSELF - OR THAT YOU ARE A FAILURE OR HAVE LET YOURSELF OR YOUR FAMILY DOWN: 0
8. MOVING OR SPEAKING SO SLOWLY THAT OTHER PEOPLE COULD HAVE NOTICED. OR THE OPPOSITE, BEING SO FIGETY OR RESTLESS THAT YOU HAVE BEEN MOVING AROUND A LOT MORE THAN USUAL: 0
1. LITTLE INTEREST OR PLEASURE IN DOING THINGS: 1
SUM OF ALL RESPONSES TO PHQ QUESTIONS 1-9: 5
3. TROUBLE FALLING OR STAYING ASLEEP: 1
5. POOR APPETITE OR OVEREATING: 0
4. FEELING TIRED OR HAVING LITTLE ENERGY: 2

## 2023-03-02 NOTE — PROGRESS NOTES
mas    This is a 76 y.o. female who presents for  Chief Complaint   Patient presents with    Follow-up     Would like to discuss previous complaints- eye problems, burning tongue, and foot pain        HPI:     + chronic \"weird sensations\" in her feet. No pain, no burning/tingling/neuropathic Sxs. Feels the balls of her feet are swollen intermittently and frequently, can't walk as much anymore. No color changes/temperature changes. No pain in the feet. Inquires about testing for fibromyalgia. Saw rheumatology 2 years ago, was not diagnosed. \"He didn't really find much\"     Going to PT for a vertebral compression fracture. Scared to exercise more. S/p kyphoplasty      Past Medical History:   Diagnosis Date    Allergic rhinitis, cause unspecified     Cancer ([facility] [address]     basal cell on face    Osteoporosis, unspecified     Postmenopausal     Pure hypercholesterolemia     Unspecified acute reaction to stress        Past Surgical History:   Procedure Laterality Date    EYE SURGERY Right        Social History     Socioeconomic History    Marital status:      Spouse name: Not on file    Number of children: Not on file    Years of education: Not on file    Highest education level: Not on file   Occupational History    Not on file   Tobacco Use    Smoking status: Former     Packs/day: 1.00     Years: 8.00     Pack years: 8.00     Types: Cigarettes     Quit date: 1982     Years since quittin.2     Passive exposure: Past    Smokeless tobacco: Never   Vaping Use    Vaping Use: Never used   Substance and Sexual Activity    Alcohol use:  Yes     Alcohol/week: 0.0 standard drinks    Drug use: No    Sexual activity: Not on file   Other Topics Concern    Not on file   Social History Narrative    Not on file     Social Determinants of Health     Financial Resource Strain: Low Risk     Difficulty of Paying Living Expenses: Not hard at all   Food Insecurity: No Food Insecurity    Worried About Running Out of Food in the Last Year: Never true    Ran Out of Food in the Last Year: Never true   Transportation Needs: Not on file   Physical Activity: Sufficiently Active    Days of Exercise per Week: 7 days    Minutes of Exercise per Session: 40 min   Stress: Not on file   Social Connections: Not on file   Intimate Partner Violence: Not on file   Housing Stability: [address] to Pay for Housing in the Last Year: No    Number of Places Lived in the Last Year: 1    Unstable Housing in the Last Year: No       Family History   Problem Relation Age of Onset    Heart Disease Mother     COPD Father        Current Outpatient Medications   Medication Sig Dispense Refill    montelukast (SINGULAIR) 10 MG tablet Take 1 tablet by mouth nightly (Patient not taking: Reported on 3/9/2023) 30 tablet 3    alendronate (FOSAMAX) 70 MG tablet Take 1 tablet by mouth every 7 days 12 tablet 1    traZODone (DESYREL) 50 MG tablet TAKE 1 TABLET BY MOUTH EVERY NIGHT AS NEEDED FOR SLEEP (Patient taking differently: Takes 1/2 nightly) 30 tablet 2    polyethylene glycol (GLYCOLAX) 17 g packet Take 17 g by mouth daily as needed for Constipation (Patient not taking: Reported on 3/9/2023)      Calcium 600-200 MG-UNIT TABS Take by mouth 2 times daily      Cholecalciferol (VITAMIN D3) 1.25 MG (39925 UT) CAPS Take by mouth daily      Ascorbic Acid (VITAMIN C) 250 MG tablet Take 250 mg by mouth daily      Biotin 1000 MCG TABS Take 5,000 mcg by mouth daily      Zinc 30 MG TABS Take by mouth      MAGNESIUM PO Take 386 mg by mouth      Calcium Carbonate (SUPER CALCIUM PO) Take by mouth      BORON PO Take 3 mg by mouth      Nutritional Supplements (SILICA PO) Take 22 mg by mouth      VANADIUM PO Take 360 mg by mouth      TYMLOS 3120 MCG/1.56ML SOPN Inject 80 mcg into the skin daily 3 Adjustable Dose Pre-filled Pen Syringe 0    Insulin Pen Needle (PEN NEEDLES 3/16\") 31G X 5 MM MISC Use one needle for each daily dose.  90 each 0    DULoxetine (CYMBALTA) 30 MG extended release capsule Take 1 capsule by mouth daily 30 capsule 2    meloxicam (MOBIC) 15 MG tablet As needed       No current facility-administered medications for this visit.       Immunization History   Administered Date(s) Administered    COVID-19, PFIZER PURPLE top, DILUTE for use, (age 12 y+), 30mcg/0.3mL 02/27/2021, 04/07/2021    INFLUENZA, INTRADERMAL, QUADRIVALENT, PRESERVATIVE FREE 09/30/2016    Influenza Virus Vaccine 12/13/2011, 09/29/2017    Influenza, AFLURIA (age 3 yrs+), FLUZONE, (age 6 mo+), MDV, 0.5mL 10/14/2015, 09/30/2016, 09/29/2017    Influenza, FLUAD, (age 65 y+), Adjuvanted, 0.5mL 11/22/2021    Influenza, FLUARIX, FLULAVAL, FLUZONE (age 6 mo+) AND AFLURIA, (age 3 y+), PF, 0.5mL 09/25/2017, 11/20/2019    Influenza, Intradermal, Preservative free 10/14/2015    Tdap (Boostrix, Adacel) 03/09/2016       Allergies   Allergen Reactions    Codeine Other (See Comments) and Nausea And Vomiting     GI       Review of Systems   Constitutional:  Negative for activity change, appetite change, chills, diaphoresis, fatigue and fever.   Eyes:  Negative for visual disturbance.   Respiratory:  Negative for chest tightness and shortness of breath.    Cardiovascular:  Negative for chest pain, palpitations and leg swelling.   Gastrointestinal:  Negative for abdominal pain, nausea and vomiting.   Genitourinary:  Negative for decreased urine volume.   Musculoskeletal:  Positive for arthralgias, back pain and myalgias.   Skin:  Negative for color change.   Neurological:  Negative for dizziness, weakness, light-headedness, numbness and headaches.     /60   Pulse 56   Temp 97.8 °F (36.6 °C)   Ht 5' 4\" (1.626 m)   Wt 107 lb (48.5 kg)   SpO2 100%   BMI 18.37 kg/m²     Physical Exam  Vitals and nursing note reviewed.   Constitutional:       General: She is not in acute distress.     Appearance: She is well-developed. She is not diaphoretic.   HENT:      Head: Normocephalic and atraumatic.   Eyes:       Extraocular Movements: Extraocular movements intact. Conjunctiva/sclera: Conjunctivae normal.      Pupils: Pupils are equal, round, and reactive to light. Neck:      Vascular: No JVD. Cardiovascular:      Rate and Rhythm: Normal rate and regular rhythm. Heart sounds: Normal heart sounds. No murmur heard. No friction rub. No gallop. Pulmonary:      Effort: Pulmonary effort is normal. No respiratory distress. Breath sounds: Normal breath sounds. No wheezing. Abdominal:      Palpations: Abdomen is soft. Musculoskeletal:         General: No swelling. Normal range of motion. Cervical back: Normal range of motion and neck supple. Skin:     General: Skin is warm and dry. Capillary Refill: Capillary refill takes 2 to 3 seconds. Findings: No erythema. Neurological:      General: No focal deficit present. Mental Status: She is alert and oriented to person, place, and time. Psychiatric:         Mood and Affect: Mood normal.         Behavior: Behavior normal.         Thought Content: Thought content normal.         Judgment: Judgment normal.       Plan  1. Altered sensation of foot  - AFL - Moraima Damico DPM, Podiatry, [facility]    2. Compression fracture of body of thoracic vertebra (HCC)  - PT aquatic therapy; Future    3. Moderate episode of recurrent major depressive disorder ([facility] [address] [address]    4. SVT (supraventricular tachycardia) ([facility] [address]    Reassurance provided   I have spent 30 minutes on patient care, with more than 50% spent counseling and coordinating care for diagnoses and plans listed above. While assessing care for this patient, I have reviewed all pertinent lab work/imaging/ specialist notes and care in reference to those problems addressed above in detail. Appropriate medical decision making was based on this.      Please note that portions of this note may have been completed with a voice recognition program. Efforts were made to edit the dictations but occasionally words are mis-transcribed. Return if symptoms worsen or fail to improve.

## 2023-03-06 RX ORDER — DULOXETIN HYDROCHLORIDE 30 MG/1
30 CAPSULE, DELAYED RELEASE ORAL DAILY
Qty: 30 CAPSULE | Refills: 2 | Status: SHIPPED | OUTPATIENT
Start: 2023-03-06

## 2023-03-06 NOTE — TELEPHONE ENCOUNTER
Refill Request     CONFIRM preferrred pharmacy with the patient.    If Mail Order Rx - Pend for 90 day refill.      Last Seen: Last Seen Department: 3/2/2023  Last Seen by PCP: 3/2/2023      Last Written: duloxetine states 30 mg but no quantity    If no future appointment scheduled, route STAFF MESSAGE with patient name to the  Pool for scheduling.      Next Appointment:   Future Appointments   Date Time Provider Department Center   3/9/2023  1:00 PM Michele Gardner MD [facility]   5/11/2023  9:00 [facility] [place] - D[facility]   8/28/2023 10:00 AM MHA CT VCT MHAZ CT Kyle Rad   8/28/2023 11:00 AM Fernando Stanley MD AND PULM MMA       Message sent to  to schedule appt with patient?  NO      Requested Prescriptions     Pending Prescriptions Disp Refills    DULoxetine (CYMBALTA) 30 MG extended release capsule 30 capsule      Sig: Take 1 capsule by mouth daily

## 2023-03-09 ENCOUNTER — OFFICE VISIT (OUTPATIENT)
Dept: ENDOCRINOLOGY | Age: 68
End: 2023-03-09

## 2023-03-09 VITALS
HEIGHT: 64 IN | BODY MASS INDEX: 17.89 KG/M2 | HEART RATE: 88 BPM | DIASTOLIC BLOOD PRESSURE: 84 MMHG | WEIGHT: 104.8 LBS | SYSTOLIC BLOOD PRESSURE: 130 MMHG | OXYGEN SATURATION: 98 %

## 2023-03-09 DIAGNOSIS — M81.0 OSTEOPOROSIS, POSTMENOPAUSAL: Primary | ICD-10-CM

## 2023-03-09 DIAGNOSIS — M80.08XD VERTEBRAL FRACTURE, OSTEOPOROTIC, WITH ROUTINE HEALING, SUBSEQUENT ENCOUNTER: ICD-10-CM

## 2023-03-09 DIAGNOSIS — Z51.81 MEDICATION MONITORING ENCOUNTER: ICD-10-CM

## 2023-03-09 RX ORDER — VIT C/HESPERIDIN/BIOFLAVONOIDS 500-100 MG
TABLET ORAL
COMMUNITY

## 2023-03-09 RX ORDER — ABALOPARATIDE 2000 UG/ML
80 INJECTION, SOLUTION SUBCUTANEOUS DAILY
Qty: 3 ADJUSTABLE DOSE PRE-FILLED PEN SYRINGE | Refills: 0 | Status: SHIPPED | OUTPATIENT
Start: 2023-03-09

## 2023-03-09 RX ORDER — PEN NEEDLE, DIABETIC 30 GX5/16"
NEEDLE, DISPOSABLE MISCELLANEOUS
Qty: 90 EACH | Refills: 0 | Status: SHIPPED | OUTPATIENT
Start: 2023-03-09

## 2023-03-09 NOTE — LETTER
[address] [facility] and Osteoporosis  [place] [address] [facility], [address] [facility],[place] [address]  Phone [phone]  Fax [phone]         2023         Tom Estrada MD, fax [phone]                            Re:  Ericka Cleveland,  1955    Dear Dr. Obdulia Oliver: Thank you for asking me to see Ericka Cleveland in consultation. As you know, Ms. Sunita Adamson is a 76 y.o. found to have osteoporosis in . BMD decreased 8655-0439 (recent T-scores -3.9 in the spine [L1-L3-L4], -3.0 in the left femoral neck. In 2022 she sustained a T10 fracture from coughing. We reviewed life-style issues (calcium, vitamin D and physical activity). I have ordered some diagnostic tests and will be back in touch when I have the results. Without effective treatment, fracture risk is high. I recommended treatment with  abaloparatide (Tymlos) and she agreed. We will check on insurance coverage and make the necessary arrangements. Enclosed is a copy of my consultation note. Please let me know if you have any questions. Sincerely,    [email or web address]     Encl.  Copy of consult note      CC: Akanksha Valladares MD

## 2023-03-09 NOTE — PROGRESS NOTES
[facility] ([facility]) Osteoporosis and [address] [facility], [address] [facility]  Phone [phone]  Fax [phone]    NAME:  Miguel Darling  :  1955  CONSULT DATE:    2023  MOST RECENT VISIT:  2023  TODAY'S DATE:  2023    Labs @ [facility] 2022    CONSULTATION REQUESTED BY: Fara Meyers MD, fax [phone]  OTHERS WHO NEED REPORTS: Rei Arredondo MD    PROBLEMS. Osteoporosis by DXA 2014, T-scores -3.2 in the spine (L1+L3+L4), -2.6 in the left femoral neck    Family history of osteoporosis, unknown    2022 T10* compression fracture (coughing, RSV infection). Natural menopause age 62    CURRENT MANAGEMENT FOR BONE HEALTH/OSTEOPOROSIS. Calcium, 300 mg from low calcium foods, 150 mg milk or 225 almond milk, 300-600 mg yogurt, 300 mg cheese   diet MVI Ca+D other    Calcium 1482-4741 746   mg/d   Vitamin D  1600  ? IU/d     25-OH D 58 ng/mL 2022 (desirable is 30-60 ng/mL)  Exercise, walks 15 min 5 days/week, PT stretching for SI joint issues  Pharmacologic therapy: Fosamax 70 mg weekly started 2022    PREVIOUS BONE-ACTIVE MEDICATIONS. Boniva ~, Actonel, , GI side effects  Fosamax [phone], had stomach issues    OTHER CURRENT MEDICATIONS (SELECTED): duloxetine, trazodone, clotrimazole, tramadol  OTC MEDICATIONS (SELECTED): vitamin C, zinc    CHIEF COMPLAINT. Are my bones strong enough for SI joint surgery?     HISTORY OF PRESENT ILLNESS: See problem list for chronic/inactive conditions. Ms. Prince Greenwood is a 51-year-old woman who was found to have osteoporosis by DXA in 2014. Dr. Juan Huber was considering an SI fusion when he learned she had osteoporosis. Later (2022) she had a T10 fracture. FOR FULL DETAILS OF FAMILY HISTORY, PAST MEDICAL AND SURGICAL HISTORY, SOCIAL HISTORY, AND REVIEW OF SYSTEMS, SEE PATIENT QUESTIONNAIRE OF TODAY'S DATE. FAMILY HISTORY. Relevant hx in problem list and/or HPI.  Otherwise not contributory. PAST MEDICAL HISTORY. Noted in health history form. PAST SURGICAL HISTORY. Noted in health history form. SOCIAL HISTORY. Past smoker (quit 1980). No excessive intake of alcohol, caffeine or sodas. Lives with her . REVIEW OF SYSTEMS. Maximum adult height 65. No significant height loss. Usual weight 107#. No recent significant change in weight. PHYSICAL EXAMINATION. GENERAL. Well-nourished, well-developed, normally proportioned adult. MENTAL STATUS. Pleasant mood. Oriented to time, place, and person. ORAL. Teeth appear to be in good condition. SKIN. Normal texture and turgor. LUNGS. Clear to auscultation. Breath sounds normal.  HEART. Heart sounds normal, no murmur or gallop. MUSCULOSKELETAL. The examination included inspection/palpation (any misalignment, asymmetry, crepitation, defects, tenderness, masses, effusions is noted), assessment of range of motion (any presence of pain, crepitation, contracture is noted), assessment of stability (any dislocation, subluxation, laxity is noted), assessment of muscle strength and tone (any atrophy or abnormal movements is noted). Pelvis appears normal.  Spinal contours are normal.  No spine tenderness to palpation or percussion. Three finger spaces between ribs and pelvis. Gait steady without assistance. NEUROLOGICAL. Able to rise from chair without using arms. No obvious motor or sensory deficit. Coordination appears normal     BONE DENSITY. Most recent done at [facility] using [employer]. I deleted L2 due to T-score discrepancy. [facility]. T-scores   Initial study: 12/29/2014 L1-L3-L4 -3.2 left fem. neck -2.6   Current study: 11/21/2022 L1-L3-L4 -3.9 left fem. neck -3.0     The table below shows bone mineral density (grams/cm2), the appropriate measure for comparing serial scans.  A significant increase or decrease is based on precision studies done at our center according to the ISCD protocol with a least significant change of 0.030 g/cm2.     PA spine Proximal Femur (left)   Date L1-L3-L4 Fem. neck Trochanter Total hip   12/29/2014 0.703 0.559 0.480 0.659   06/01/2017 0.735 0.626 0.528 Too low   11/21/2022 0.624 0.518 --- 0.619     Labs: 09/2022 Ca 10.2 Cr 0.6.  Imaging: DXA printouts reviewed.    ASSESSMENT.  Osteoporosis, bone density lower than desirable and decreased 2385-1194. Without effective therapy, risk of fracture is high.     DIAGNOSTIC PLANS. Blood tests: PTH, BAP, P1NP, SPEP, free kappa and lambda light chains. In 2 weeks (or longer), on appropriate calcium intake,  24-hour urine for calcium, creatinine and sodium. I will be in touch with the patient to review lab results.    THERAPEUTIC PLANS.   Calcium, target 1200 mg daily; we discussed recommended calcium intake and the effects of excessive calcium intake from supplements. I provided a handout with information about how to calculate daily calcium intake.  Advised to reduce calcium supplements.   Vitamin D, recommend she resume the dose of vitamin D she was taking 12/2022 when the blood 25-OH D was fine.   Exercise, Recommended weight-bearing exercise (walking or equivalent) 30-40 minutes per session, 3 or 4 sessions a week.  Advised to take care to avoid injury (high impact, falling, etc).  We discussed avoiding activities that place compressive forces on the spine; specifically pushing, pulling, bending, lifting and twisting to help prevent vertebral fractures and reviewed exercises to improve posture and balance.   Pharmacologic therapy, We discussed anabolic agents teriparatide and abaloparatide (Forteo or Tymlos) for pharmacologic therapy due to very low BMD and history of fractures. I explained dosing schedule, side effects and adverse effects (osteosarcoma in rats). I provided educational material and did a brief demonstration with our practice pens. We will submit the order to the patient's insurance and will  contact the patient about any out-of-pocket costs. I prefer Tymlos because of better effect on the hip. Return appointment after 2-3 months of Tymlos. Total time on the date the encounter was  minutes. Juice Gardner MD, Director, [facility]) Osteoporosis and Bone Health Services    CC:  Rene Schreiber MD, fax [phone]  Pamela Mueller MD

## 2023-03-15 DIAGNOSIS — M81.0 OSTEOPOROSIS, POSTMENOPAUSAL: ICD-10-CM

## 2023-03-15 DIAGNOSIS — M80.08XD VERTEBRAL FRACTURE, OSTEOPOROTIC, WITH ROUTINE HEALING, SUBSEQUENT ENCOUNTER: ICD-10-CM

## 2023-03-15 LAB — PTH-INTACT SERPL-MCNC: 30.2 PG/ML (ref 14–72)

## 2023-03-15 ASSESSMENT — ENCOUNTER SYMPTOMS
SHORTNESS OF BREATH: 0
NAUSEA: 0
ABDOMINAL PAIN: 0
CHEST TIGHTNESS: 0
COLOR CHANGE: 0
VOMITING: 0
BACK PAIN: 1

## 2023-03-17 DIAGNOSIS — M81.0 OSTEOPOROSIS, POSTMENOPAUSAL: ICD-10-CM

## 2023-03-17 DIAGNOSIS — M80.08XD VERTEBRAL FRACTURE, OSTEOPOROTIC, WITH ROUTINE HEALING, SUBSEQUENT ENCOUNTER: Primary | ICD-10-CM

## 2023-03-17 LAB
ALBUMIN SERPL ELPH-MCNC: 3.8 G/DL (ref 3.1–4.9)
ALP BONE SERPL-MCNC: 12.6 UG/L
ALPHA1 GLOB SERPL ELPH-MCNC: 0.3 G/DL (ref 0.2–0.4)
ALPHA2 GLOB SERPL ELPH-MCNC: 0.8 G/DL (ref 0.4–1.1)
B-GLOBULIN SERPL ELPH-MCNC: 1.2 G/DL (ref 0.9–1.6)
GAMMA GLOB SERPL ELPH-MCNC: 1.1 G/DL (ref 0.6–1.8)
KAPPA LC FREE SER-MCNC: 11.82 MG/L (ref 3.3–19.4)
KAPPA LC FREE/LAMBDA FREE SER: 0.9 {RATIO} (ref 0.26–1.65)
LAMBDA LC FREE SERPL-MCNC: 13.11 MG/L (ref 5.71–26.3)
PROT SERPL-MCNC: 7.1 G/DL (ref 6.4–8.2)
RPT COMMENT: NORMAL
SPE/IFE INTERPRETATION: NORMAL

## 2023-03-17 RX ORDER — PEN NEEDLE, DIABETIC 30 GX5/16"
NEEDLE, DISPOSABLE MISCELLANEOUS
Qty: 90 EACH | Refills: 0 | Status: SHIPPED | OUTPATIENT
Start: 2023-03-17

## 2023-03-17 RX ORDER — ABALOPARATIDE 2000 UG/ML
80 INJECTION, SOLUTION SUBCUTANEOUS DAILY
Qty: 3 ADJUSTABLE DOSE PRE-FILLED PEN SYRINGE | Refills: 0 | Status: SHIPPED | OUTPATIENT
Start: 2023-03-17

## 2023-03-20 ENCOUNTER — TELEPHONE (OUTPATIENT)
Dept: ENDOCRINOLOGY | Age: 68
End: 2023-03-20

## 2023-03-20 LAB — PINP SER-MCNC: 25 UG/L

## 2023-03-21 ENCOUNTER — TELEPHONE (OUTPATIENT)
Dept: ENDOCRINOLOGY | Age: 68
End: 2023-03-21

## 2023-03-22 NOTE — TELEPHONE ENCOUNTER
Isidro Pimentel Trinh: [id number] - PA Case ID: [id number] - Rx #: [id number]  Outcome  Approved today  PA Case: [id number], Status: Approved, Coverage Starts on: 1/1/2023 12:00:00 AM, Coverage Ends on: 12/31/2023 12:00:00 AM.

## 2023-03-27 ENCOUNTER — TELEPHONE (OUTPATIENT)
Dept: ENDOCRINOLOGY | Age: 68
End: 2023-03-27

## 2023-03-27 NOTE — TELEPHONE ENCOUNTER
Patient was told by Kristina Parkinson that her out of pocket for Tymlos would be about $1000 a month. She is asking if office knows of any savings or assistance programs she can enroll in. Kristina Parkinson was going to enroll her but they were asking for her social security number and she was unsure if she should give it out. Please advise.

## 2023-03-27 NOTE — TELEPHONE ENCOUNTER
Office mailed an [employer] form to the patient  She will fill out form and return to this office

## 2023-03-28 ENCOUNTER — TELEMEDICINE (OUTPATIENT)
Dept: FAMILY MEDICINE CLINIC | Age: 68
End: 2023-03-28

## 2023-03-28 DIAGNOSIS — F32.0 CURRENT MILD EPISODE OF MAJOR DEPRESSIVE DISORDER WITHOUT PRIOR EPISODE (HCC): ICD-10-CM

## 2023-03-28 DIAGNOSIS — F41.1 GAD (GENERALIZED ANXIETY DISORDER): ICD-10-CM

## 2023-03-28 DIAGNOSIS — Z51.81 MEDICATION MONITORING ENCOUNTER: ICD-10-CM

## 2023-03-28 DIAGNOSIS — M80.08XD VERTEBRAL FRACTURE, OSTEOPOROTIC, WITH ROUTINE HEALING, SUBSEQUENT ENCOUNTER: ICD-10-CM

## 2023-03-28 DIAGNOSIS — M47.896 OTHER OSTEOARTHRITIS OF SPINE, LUMBAR REGION: Primary | ICD-10-CM

## 2023-03-28 DIAGNOSIS — M81.0 OSTEOPOROSIS, POSTMENOPAUSAL: ICD-10-CM

## 2023-03-28 DIAGNOSIS — M47.817 LUMBOSACRAL SPONDYLOSIS WITHOUT MYELOPATHY: ICD-10-CM

## 2023-03-28 DIAGNOSIS — G89.4 CHRONIC PAIN DISORDER: ICD-10-CM

## 2023-03-28 PROBLEM — M46.1 SACROILIITIS, NOT ELSEWHERE CLASSIFIED (HCC): Status: RESOLVED | Noted: 2020-09-24 | Resolved: 2023-03-28

## 2023-03-28 LAB
CALCIUM 24H UR-MRATE: 269 MG/24 HR (ref 42–353)
CREAT 24H UR-MRATE: 0.8 G/24HR (ref 0.6–1.5)
SODIUM 24H UR-SRATE: 97 MMOL/24 HR (ref 40–220)
SPECIMEN VOL 24H UR: 2490 ML

## 2023-03-28 RX ORDER — DULOXETIN HYDROCHLORIDE 20 MG/1
20 CAPSULE, DELAYED RELEASE ORAL DAILY
Qty: 30 CAPSULE | Refills: 3 | Status: SHIPPED | OUTPATIENT
Start: 2023-03-28

## 2023-03-28 ASSESSMENT — ENCOUNTER SYMPTOMS
COLOR CHANGE: 0
VOMITING: 0
CHEST TIGHTNESS: 0
BACK PAIN: 0
NAUSEA: 0
ABDOMINAL PAIN: 0
SHORTNESS OF BREATH: 0

## 2023-03-28 NOTE — PROGRESS NOTES
review content in terms of scientific evidence. I encouraged her to forward any reviews supporting that statement. Cymbalta indications, mechanism of action, and safety data reviewed. PT and seeing a psychologist are very helpful and she is still interested in weaning Cymbalta to 20 mg, sent to pharmacy on file on a month-by-month basis given the possibility of weaning further in the future. If she does decide to wean off in the future, we discussed taking 20 mg every other day for 2 weeks and then stopping. 8. Osteoporosis, postmenopausal  Following with Dr. Brianne La     Return if symptoms worsen or fail to improve. Kylah Villanueva is a 76 y.o. female being evaluated by a Virtual Visit (video visit) encounter to address concerns as mentioned above. A caregiver was present when appropriate. Due to this being a TeleHealth encounter (During VYGPN-19 public health emergency), evaluation of the following organ systems was limited: Vitals/Constitutional/EENT/Resp/CV/GI//MS/Neuro/Skin/Heme-Lymph-Imm. Pursuant to the emergency declaration under the [address] [place] [address] authority and the [employer] Act, this Virtual Visit was conducted with patient's (and/or legal guardian's) consent, to reduce the patient's risk of exposure to COVID-19 and provide necessary medical care. The patient (and/or legal guardian) has also been advised to contact this office for worsening conditions or problems, and seek emergency medical treatment and/or call 911 if deemed necessary. Services were provided through a video synchronous discussion virtually to substitute for in-person clinic visit. Patient and provider were located at their individual homes. --Luke Koenig MD on 3/28/2023 at 10:33 AM    An electronic signature was used to authenticate this note.

## 2023-03-29 DIAGNOSIS — R82.994 HYPERCALCIURIA: Primary | ICD-10-CM

## 2023-03-29 RX ORDER — HYDROCHLOROTHIAZIDE 12.5 MG/1
12.5 CAPSULE, GELATIN COATED ORAL EVERY MORNING
Qty: 90 CAPSULE | Refills: 0 | Status: SHIPPED | OUTPATIENT
Start: 2023-03-29

## 2023-04-03 ENCOUNTER — TELEPHONE (OUTPATIENT)
Dept: ENDOCRINOLOGY | Age: 68
End: 2023-04-03

## 2023-04-04 ENCOUNTER — TELEPHONE (OUTPATIENT)
Dept: ENDOCRINOLOGY | Age: 68
End: 2023-04-04

## 2023-04-17 ENCOUNTER — TELEPHONE (OUTPATIENT)
Dept: ENDOCRINOLOGY | Age: 68
End: 2023-04-17

## 2023-04-17 NOTE — TELEPHONE ENCOUNTER
Message sent via [employer]    Hopefully you have received the [employer] forms in the mail. [employer] states they cant get in contact with patient and they are still waiting for patient to send their portion of the forms and insurance cards front and back and their tax forms.     The physician portion of the form has already been submitted to [employer]    Please fill out the form and send to [employer].

## 2023-04-18 ENCOUNTER — TELEPHONE (OUTPATIENT)
Dept: ENDOCRINOLOGY | Age: 68
End: 2023-04-18

## 2023-04-18 NOTE — TELEPHONE ENCOUNTER
Message sent via My Chart related to tymlos vs evenity    Per Dr Jose Alfredo Kumar and Negro are different from each other. Either would be OK for her.

## 2023-04-25 ENCOUNTER — TELEPHONE (OUTPATIENT)
Dept: ENDOCRINOLOGY | Age: 68
End: 2023-04-25

## 2023-04-25 NOTE — TELEPHONE ENCOUNTER
Left message for the patient to call the office related to  A medication question  Please call the office and let nurse know which medication.

## 2023-04-26 RX ORDER — TERIPARATIDE 250 UG/ML
20 INJECTION, SOLUTION SUBCUTANEOUS DAILY
Qty: 3 ADJUSTABLE DOSE PRE-FILLED PEN SYRINGE | Refills: 3 | Status: SHIPPED | OUTPATIENT
Start: 2023-04-26

## 2023-04-26 NOTE — TELEPHONE ENCOUNTER
She is anticipating spine surgery. I don't think Evenity is the best choice. Why was Tymlos denied? I would like to see that denial in writing. Gogo would be my second choice if Tymlos is not affordable.

## 2023-04-26 NOTE — TELEPHONE ENCOUNTER
Pt called back and advised she was not approved for Tymlos. Pt is not going to pay out of pocket to expensive. Pt would like to check into Evenity.     Please advise pt.  [phone]

## 2023-04-26 NOTE — TELEPHONE ENCOUNTER
Patient has an approval for the [facility] but can not afford the medication  She was denied assistance with the medication

## 2023-04-28 DIAGNOSIS — M81.0 AGE-RELATED OSTEOPOROSIS WITHOUT CURRENT PATHOLOGICAL FRACTURE: ICD-10-CM

## 2023-04-28 RX ORDER — ALENDRONATE SODIUM 70 MG/1
70 TABLET ORAL
Qty: 12 TABLET | Refills: 1 | OUTPATIENT
Start: 2023-04-28

## 2023-04-28 NOTE — TELEPHONE ENCOUNTER
Refill Request     CONFIRM preferred pharmacy with the patient. If Mail Order Rx - Pend for 90 day refill. Last Seen: Last Seen Department: 3/28/2023  Last Seen by PCP: 3/28/2023    Last Written: alendronate- 11/29/2022 12 tablet 1 refill     If no future appointment scheduled:  Review the last OV with PCP and review information for follow-up visit,  Route STAFF MESSAGE with patient name to the [facility] for scheduling with the following information:            -  Timing of next visit           -  Visit type ie Physical, OV, etc           -  Diagnoses/Reason ie. COPD, HTN - Do not use MEDICATION, Follow-up or CHECK UP - Give reason for visit      Next Appointment:   Future Appointments   Date Time Provider Erick Bobby   5/11/2023  9:00 AM SCHEDULE, MHCX SANDRA HUANG AWV LPN SANDR[facility] HUANG [place] - DYD   8/28/2023 10:00 AM MHA CT VCT MHAZ CT [employer]   8/28/2023 11:00 AM James Larsen MD AND PULKESHIA [facility]STRA[facility]   9/5/2023  2:00 PM Niraj Cruz MD [facility]       Message sent to [address] [place] [address] to schedule appt with patient?   NO      Requested Prescriptions     Pending Prescriptions Disp Refills    alendronate (FOSAMAX) 70 MG tablet 12 tablet 1     Sig: Take 1 tablet by mouth every 7 days

## 2023-04-29 RX ORDER — ALENDRONATE SODIUM 70 MG/1
70 TABLET ORAL
Qty: 12 TABLET | Refills: 0 | Status: SHIPPED | OUTPATIENT
Start: 2023-04-29

## 2023-05-01 DIAGNOSIS — M81.0 OSTEOPOROSIS, POSTMENOPAUSAL: Primary | ICD-10-CM

## 2023-05-01 RX ORDER — ALENDRONATE SODIUM 70 MG/1
70 TABLET ORAL
Qty: 12 TABLET | Refills: 1 | OUTPATIENT
Start: 2023-05-01

## 2023-05-05 ENCOUNTER — TELEPHONE (OUTPATIENT)
Dept: ENDOCRINOLOGY | Age: 68
End: 2023-05-05

## 2023-05-09 ENCOUNTER — TELEPHONE (OUTPATIENT)
Dept: ENDOCRINOLOGY | Age: 68
End: 2023-05-09

## 2023-05-11 ENCOUNTER — OFFICE VISIT (OUTPATIENT)
Dept: FAMILY MEDICINE CLINIC | Age: 68
End: 2023-05-11
Payer: MEDICARE

## 2023-05-11 VITALS
BODY MASS INDEX: 18.2 KG/M2 | HEIGHT: 64 IN | HEART RATE: 84 BPM | DIASTOLIC BLOOD PRESSURE: 60 MMHG | SYSTOLIC BLOOD PRESSURE: 130 MMHG | WEIGHT: 106.6 LBS | OXYGEN SATURATION: 98 %

## 2023-05-11 DIAGNOSIS — S90.122A TRAUMATIC ECCHYMOSIS OF TOE OF LEFT FOOT, INITIAL ENCOUNTER: ICD-10-CM

## 2023-05-11 DIAGNOSIS — M79.672 PAIN IN BOTH FEET: Primary | ICD-10-CM

## 2023-05-11 DIAGNOSIS — M79.671 PAIN IN BOTH FEET: Primary | ICD-10-CM

## 2023-05-11 PROCEDURE — 99212 OFFICE O/P EST SF 10 MIN: CPT | Performed by: STUDENT IN AN ORGANIZED HEALTH CARE EDUCATION/TRAINING PROGRAM

## 2023-05-11 PROCEDURE — 1036F TOBACCO NON-USER: CPT | Performed by: STUDENT IN AN ORGANIZED HEALTH CARE EDUCATION/TRAINING PROGRAM

## 2023-05-11 PROCEDURE — 1123F ACP DISCUSS/DSCN MKR DOCD: CPT | Performed by: STUDENT IN AN ORGANIZED HEALTH CARE EDUCATION/TRAINING PROGRAM

## 2023-05-11 PROCEDURE — 1090F PRES/ABSN URINE INCON ASSESS: CPT | Performed by: STUDENT IN AN ORGANIZED HEALTH CARE EDUCATION/TRAINING PROGRAM

## 2023-05-11 PROCEDURE — G8419 CALC BMI OUT NRM PARAM NOF/U: HCPCS | Performed by: STUDENT IN AN ORGANIZED HEALTH CARE EDUCATION/TRAINING PROGRAM

## 2023-05-11 PROCEDURE — 3017F COLORECTAL CA SCREEN DOC REV: CPT | Performed by: STUDENT IN AN ORGANIZED HEALTH CARE EDUCATION/TRAINING PROGRAM

## 2023-05-11 PROCEDURE — G8399 PT W/DXA RESULTS DOCUMENT: HCPCS | Performed by: STUDENT IN AN ORGANIZED HEALTH CARE EDUCATION/TRAINING PROGRAM

## 2023-05-11 PROCEDURE — G8427 DOCREV CUR MEDS BY ELIG CLIN: HCPCS | Performed by: STUDENT IN AN ORGANIZED HEALTH CARE EDUCATION/TRAINING PROGRAM

## 2023-05-11 NOTE — PROGRESS NOTES
Patient: Ugo Hayes is a 76 y.o. female who presents today with the following Chief Complaint(s):  Chief Complaint   Patient presents with    Foot Swelling     Left foot black and blue          HPI    Patient reports that she woke up yesterday with bruising to lateral edge of left foot. Reports that this happened 1 time previously about 1 year ago. Reports about a month and half ago she did change shoes to a wider forefoot block shoe and has been wearing these since that time. Did not do any abnormal activity the day prior to noticing. Does not remember any specific injury. Does also report that she has mild chronic pain in the forefoot of right foot as well. Has not seen a podiatrist for this.   Does have a planned new patient visit with podiatry next week  Current Outpatient Medications   Medication Sig Dispense Refill    alendronate (FOSAMAX) 70 MG tablet TAKE 1 TABLET BY MOUTH EVERY 7 DAYS 12 tablet 0    teriparatide (FORTEO) 620 MCG/2.48ML SOPN injection Inject 0.08 mLs into the skin daily 3 Adjustable Dose Pre-filled Pen Syringe 3    DULoxetine (CYMBALTA) 20 MG extended release capsule Take 1 capsule by mouth daily 30 capsule 3    TYMLOS 3120 MCG/1.56ML SOPN Inject 80 mcg into the skin daily 3 Adjustable Dose Pre-filled Pen Syringe 0    Zinc 30 MG TABS Take by mouth      MAGNESIUM PO Take 386 mg by mouth      Calcium Carbonate (SUPER CALCIUM PO) Take by mouth      BORON PO Take 3 mg by mouth      Nutritional Supplements (SILICA PO) Take 22 mg by mouth      VANADIUM PO Take 360 mg by mouth      traZODone (DESYREL) 50 MG tablet TAKE 1 TABLET BY MOUTH EVERY NIGHT AS NEEDED FOR SLEEP (Patient taking differently: Takes 1/2 nightly) 30 tablet 2    Calcium 600-200 MG-UNIT TABS Take by mouth 2 times daily      Cholecalciferol (VITAMIN D3) 1.25 MG (38953 UT) CAPS Take by mouth daily      Ascorbic Acid (VITAMIN C) 250 MG tablet Take 1 tablet by mouth daily      Biotin 1000 MCG TABS Take 5,000 mcg by mouth

## 2023-05-18 ENCOUNTER — TRANSCRIBE ORDERS (OUTPATIENT)
Dept: ADMINISTRATIVE | Age: 68
End: 2023-05-18

## 2023-05-18 DIAGNOSIS — F51.04 PSYCHOPHYSIOLOGICAL INSOMNIA: ICD-10-CM

## 2023-05-18 DIAGNOSIS — I73.89 ACROCYANOSIS (HCC): Primary | ICD-10-CM

## 2023-05-18 RX ORDER — TRAZODONE HYDROCHLORIDE 50 MG/1
TABLET ORAL
Qty: 30 TABLET | Refills: 2 | Status: SHIPPED | OUTPATIENT
Start: 2023-05-18

## 2023-05-18 NOTE — TELEPHONE ENCOUNTER
Refill Request     CONFIRM preferred pharmacy with the patient. If Mail Order Rx - Pend for 90 day refill. Last Seen: Last Seen Department: 5/11/2023  Last Seen by PCP: 3/28/2023    Last Written: 11/14/22 30 with 2 refills     If no future appointment scheduled:  Review the last OV with PCP and review information for follow-up visit,  Route STAFF MESSAGE with patient name to the [facility] for scheduling with the following information:            -  Timing of next visit           -  Visit type ie Physical, OV, etc           -  Diagnoses/Reason ie. COPD, HTN - Do not use MEDICATION, Follow-up or CHECK UP - Give reason for visit      Next Appointment:   Future Appointments   Date Time Provider Erick Bobby   8/28/2023 10:00 AM [facility] CT VCT [facility] Dirk Dials   8/28/2023 11:00 AM Cate Huber MD AND DAVID PASTRANA   9/5/2023  2:00 PM Merlyn Wynne MD [facility] MMA       Message sent to [address] [place] [address] to schedule appt with patient?   NO      Requested Prescriptions     Pending Prescriptions Disp Refills    traZODone (DESYREL) 50 MG tablet [Pharmacy Med Name: TRAZODONE 50MG TABLETS] 30 tablet 2     Sig: TAKE 1 TABLET BY MOUTH EVERY NIGHT AS NEEDED FOR SLEEP

## 2023-05-26 ENCOUNTER — TELEPHONE (OUTPATIENT)
Dept: ENDOCRINOLOGY | Age: 68
End: 2023-05-26

## 2023-06-02 NOTE — PATIENT INSTRUCTIONS
Your recent COVID test was positive. You are a candidate for Paxlovid. Allergies, Medications, and labs reviewed. Currently, the CDC recommends staying at home in self isolation for at least 5 days. After that, if you are without a fever and symptoms are improving, you may go out, but only if wearing a mask for an additional 5 days. Thankfully, most people recover uneventfully. The mainstay of treatment for most people remains focused on symptom control, isolating and watching for signs of worsening illness. You should drink plenty of fluids, eat when you are able, and rest as much as possible. Recommend treating symptoms with OTC medications: Flonase for congestions, Mucinex for Phlegm, Robitussin DM or Delsym for cough, Tylenol/Ibuprofen for fever/body aches. Recommend Vitamin D, C and Zinc.    We do not prescribe antibiotics for viral illnesses; however, we can treat secondary infections should the need arise. Please seek more urgent medical attention if you develop any of the following:  Chest pain  Shortness of breath  Bluish lips or face  Severe headache   Severe dizziness or passing out  New confusion  Inability to stay awake  Extreme weakness    If you have a pulse oximeter, check it at least daily. Seek urgent medical attention if it drops to 92% or below. No

## 2023-06-07 ENCOUNTER — TELEPHONE (OUTPATIENT)
Dept: ENDOCRINOLOGY | Age: 68
End: 2023-06-07

## 2023-06-07 NOTE — TELEPHONE ENCOUNTER
Call from pt stating that [facility] needs pg's 5 & 6 faxed over to them    Fax # [phone]

## 2023-06-08 ENCOUNTER — TELEPHONE (OUTPATIENT)
Dept: ENDOCRINOLOGY | Age: 68
End: 2023-06-08

## 2023-06-09 ENCOUNTER — HOSPITAL ENCOUNTER (OUTPATIENT)
Dept: VASCULAR LAB | Age: 68
Discharge: HOME | End: 2023-06-09
Payer: MEDICARE

## 2023-06-09 DIAGNOSIS — I73.89 ACROCYANOSIS (HCC): ICD-10-CM

## 2023-06-09 PROCEDURE — 93922 UPR/L XTREMITY ART 2 LEVELS: CPT

## 2023-06-26 ENCOUNTER — TELEPHONE (OUTPATIENT)
Dept: ENDOCRINOLOGY | Age: 68
End: 2023-06-26

## 2023-06-28 ENCOUNTER — OFFICE VISIT (OUTPATIENT)
Dept: FAMILY MEDICINE CLINIC | Age: 68
End: 2023-06-28

## 2023-06-28 VITALS
TEMPERATURE: 98.1 F | SYSTOLIC BLOOD PRESSURE: 120 MMHG | HEART RATE: 72 BPM | DIASTOLIC BLOOD PRESSURE: 80 MMHG | BODY MASS INDEX: 18.44 KG/M2 | HEIGHT: 64 IN | WEIGHT: 108 LBS | OXYGEN SATURATION: 98 %

## 2023-06-28 DIAGNOSIS — R05.1 ACUTE COUGH: ICD-10-CM

## 2023-06-28 DIAGNOSIS — J06.9 VIRAL URI: ICD-10-CM

## 2023-06-28 DIAGNOSIS — J02.9 ACUTE PHARYNGITIS, UNSPECIFIED ETIOLOGY: Primary | ICD-10-CM

## 2023-06-28 LAB — S PYO AG THROAT QL: NORMAL

## 2023-06-28 RX ORDER — BENZONATATE 200 MG/1
200 CAPSULE ORAL 3 TIMES DAILY PRN
Qty: 30 CAPSULE | Refills: 0 | Status: SHIPPED | OUTPATIENT
Start: 2023-06-28 | End: 2023-07-08

## 2023-06-28 ASSESSMENT — ENCOUNTER SYMPTOMS
NAUSEA: 0
SINUS PAIN: 0
SORE THROAT: 1
RHINORRHEA: 0
TROUBLE SWALLOWING: 1
DIARRHEA: 0
VOMITING: 0
SINUS PRESSURE: 0
COUGH: 1
SHORTNESS OF BREATH: 0

## 2023-06-29 ENCOUNTER — PATIENT MESSAGE (OUTPATIENT)
Dept: ENDOCRINOLOGY | Age: 68
End: 2023-06-29

## 2023-06-29 DIAGNOSIS — R82.994 HYPERCALCIURIA: Primary | ICD-10-CM

## 2023-06-29 LAB — SARS-COV-2 RNA RESP QL NAA+PROBE: NOT DETECTED

## 2023-07-05 DIAGNOSIS — M81.0 OSTEOPOROSIS, POSTMENOPAUSAL: Primary | ICD-10-CM

## 2023-07-13 ENCOUNTER — TELEPHONE (OUTPATIENT)
Dept: INTERNAL MEDICINE CLINIC | Age: 68
End: 2023-07-13

## 2023-07-13 DIAGNOSIS — M81.0 AGE-RELATED OSTEOPOROSIS WITHOUT CURRENT PATHOLOGICAL FRACTURE: Primary | ICD-10-CM

## 2023-07-13 NOTE — TELEPHONE ENCOUNTER
This was a call from the [facility] ([facility]), patient is requesting a referral for rheumatology. No reason listed. Sorry, not sure why it did not transfer with the encounter.

## 2023-07-13 NOTE — TELEPHONE ENCOUNTER
To my understanding, she has seen rheumatology in the past for her severe osteoporosis. External referral placed. Please let her know.

## 2023-07-23 DIAGNOSIS — M81.0 AGE-RELATED OSTEOPOROSIS WITHOUT CURRENT PATHOLOGICAL FRACTURE: ICD-10-CM

## 2023-07-23 NOTE — TELEPHONE ENCOUNTER
.Refill Request     CONFIRM preferred pharmacy with the patient. If Mail Order Rx - Pend for 90 day refill. Last Seen: Last Seen Department: 6/28/2023  Last Seen by PCP: Visit date not found    Last Written: 4/29/23 12 with 0     If no future appointment scheduled:  Review the last OV with PCP and review information for follow-up visit,  Route STAFF MESSAGE with patient name to the [facility] for scheduling with the following information:            -  Timing of next visit           -  Visit type ie Physical, OV, etc           -  Diagnoses/Reason ie. COPD, HTN - Do not use MEDICATION, Follow-up or CHECK UP - Give reason for visit      Next Appointment:   Future Appointments   Date Time Provider [address]   8/8/2023 11:00 AM Cuba Hauser AND AUDIO [facility]   8/8/2023 11:30 AM Fani Holguin [facility] ENT [facility]   8/21/2023 10:50 AM Amari Louise MD [facility] DYD   8/28/2023 10:00 AM M[facility] CT VCT [facility]HA[facility] CT Kyle Rad   8/28/2023 11:00 AM Sandy Winchester MD AND PULM [facility]   9/5/2023  2:00 PM Timmy Ghosh MD [facility] MMA   9/7/2023 10:30 AM [facility] EG WC MAMMO 2 [address] [place] EG WC Waldo Rad       Message sent to [facility] to schedule appt with patient?   YES      Requested Prescriptions     Pending Prescriptions Disp Refills    alendronate (FOSAMAX) 70 MG tablet [Pharmacy Med Name: ALENDRONATE 70MG TABLETS] 12 tablet 0     Sig: TAKE 1 TABLET BY MOUTH EVERY 7 DAYS

## 2023-07-24 RX ORDER — ALENDRONATE SODIUM 70 MG/1
70 TABLET ORAL
Qty: 12 TABLET | Refills: 0 | Status: SHIPPED | OUTPATIENT
Start: 2023-07-24

## 2023-07-25 DIAGNOSIS — M81.0 OSTEOPOROSIS, POSTMENOPAUSAL: ICD-10-CM

## 2023-07-25 LAB
CALCIUM 24H UR-MRATE: 195 MG/24 HR (ref 42–353)
CREAT 24H UR-MRATE: 0.9 G/24HR (ref 0.6–1.5)
SPECIMEN VOL 24H UR: 2750 ML

## 2023-08-07 ENCOUNTER — TELEPHONE (OUTPATIENT)
Dept: ENDOCRINOLOGY | Age: 68
End: 2023-08-07

## 2023-08-07 NOTE — TELEPHONE ENCOUNTER
Patient is having numbness in toes and fingers and her throat burns      She is fosamax and questioning if this is a side affect      Please call patient at [phone]

## 2023-08-07 NOTE — TELEPHONE ENCOUNTER
Numbness of the toes is NOT a side effect of Fosamax. Fosamax can irritate the esophagus -- causing heartburn, chest pain, etc.  It's a once-a-week pill, and if it is the culprit, it would only cause problems on the day of the week it is taken. If she is having problems on days she has not taken Fosamax, it's not the cause of the problems.

## 2023-08-08 ENCOUNTER — PROCEDURE VISIT (OUTPATIENT)
Dept: AUDIOLOGY | Age: 68
End: 2023-08-08
Payer: MEDICARE

## 2023-08-08 ENCOUNTER — OFFICE VISIT (OUTPATIENT)
Dept: ENT CLINIC | Age: 68
End: 2023-08-08
Payer: MEDICARE

## 2023-08-08 VITALS
DIASTOLIC BLOOD PRESSURE: 80 MMHG | BODY MASS INDEX: 18.54 KG/M2 | TEMPERATURE: 97.9 F | RESPIRATION RATE: 16 BRPM | HEART RATE: 70 BPM | HEIGHT: 64 IN | WEIGHT: 108.6 LBS | SYSTOLIC BLOOD PRESSURE: 144 MMHG

## 2023-08-08 DIAGNOSIS — H90.3 SENSORINEURAL HEARING LOSS, BILATERAL: Primary | ICD-10-CM

## 2023-08-08 DIAGNOSIS — H93.11 TINNITUS OF RIGHT EAR: ICD-10-CM

## 2023-08-08 DIAGNOSIS — H90.3 ASYMMETRIC SNHL (SENSORINEURAL HEARING LOSS): ICD-10-CM

## 2023-08-08 DIAGNOSIS — H90.3 SENSORINEURAL HEARING LOSS (SNHL) OF BOTH EARS: Primary | ICD-10-CM

## 2023-08-08 DIAGNOSIS — R42 DIZZINESS AND GIDDINESS: ICD-10-CM

## 2023-08-08 PROCEDURE — 99214 OFFICE O/P EST MOD 30 MIN: CPT | Performed by: OTOLARYNGOLOGY

## 2023-08-08 PROCEDURE — 1090F PRES/ABSN URINE INCON ASSESS: CPT | Performed by: OTOLARYNGOLOGY

## 2023-08-08 PROCEDURE — 1036F TOBACCO NON-USER: CPT | Performed by: OTOLARYNGOLOGY

## 2023-08-08 PROCEDURE — 92557 COMPREHENSIVE HEARING TEST: CPT | Performed by: AUDIOLOGIST

## 2023-08-08 PROCEDURE — G8399 PT W/DXA RESULTS DOCUMENT: HCPCS | Performed by: OTOLARYNGOLOGY

## 2023-08-08 PROCEDURE — 3017F COLORECTAL CA SCREEN DOC REV: CPT | Performed by: OTOLARYNGOLOGY

## 2023-08-08 PROCEDURE — G8420 CALC BMI NORM PARAMETERS: HCPCS | Performed by: OTOLARYNGOLOGY

## 2023-08-08 PROCEDURE — G8427 DOCREV CUR MEDS BY ELIG CLIN: HCPCS | Performed by: OTOLARYNGOLOGY

## 2023-08-08 PROCEDURE — 1123F ACP DISCUSS/DSCN MKR DOCD: CPT | Performed by: OTOLARYNGOLOGY

## 2023-08-08 PROCEDURE — 92567 TYMPANOMETRY: CPT | Performed by: AUDIOLOGIST

## 2023-08-08 RX ORDER — ROMOSOZUMAB-AQQG 105 MG/1.17ML
210 INJECTION, SOLUTION SUBCUTANEOUS ONCE
Qty: 1 EACH | Refills: 11 | Status: SHIPPED | OUTPATIENT
Start: 2023-08-08 | End: 2023-08-08

## 2023-08-08 ASSESSMENT — ENCOUNTER SYMPTOMS
TROUBLE SWALLOWING: 0
SORE THROAT: 0
FACIAL SWELLING: 0
VOICE CHANGE: 0
SHORTNESS OF BREATH: 0
EYE ITCHING: 0
COUGH: 0
SINUS PRESSURE: 0
APNEA: 0

## 2023-08-08 NOTE — PROGRESS NOTES
[facility], [address] [place] [address], [address] [place] [address],[address]  P: [phone]       Patient     Nydia Martinez  1955    ChiefComplaint     Chief Complaint   Patient presents with    Ear Problem     Patient is here today to have her hearing checked. History of Present Illness     Jaimie Swenson is a 64-year female here today for evaluation of hearing. Recently found hypertensive but stated you have osteoporosis should have your hearing checked and therefore she is here for that. Denies otalgia, otorrhea, vertigo. Longstanding right-sided tinnitus. No history of ear surgeries. Past Medical History     Past Medical History:   Diagnosis Date    Allergic rhinitis, cause unspecified     Cancer ([address])     basal cell on face    Osteoporosis, unspecified     Postmenopausal     Pure hypercholesterolemia     Unspecified acute reaction to stress        Past Surgical History     Past Surgical History:   Procedure Laterality Date    EYE SURGERY Right        Family History     Family History   Problem Relation Age of Onset    Heart Disease Mother     COPD Father        Social History     Social History     Tobacco Use    Smoking status: Former     Packs/day: 1.00     Years: 8.00     Pack years: 8.00     Types: Cigarettes     Quit date: 1982     Years since quittin.6     Passive exposure: Past    Smokeless tobacco: Never   Vaping Use    Vaping Use: Never used   Substance Use Topics    Alcohol use:  Yes     Alcohol/week: 0.0 standard drinks    Drug use: No        Allergies     Allergies   Allergen Reactions    Codeine Other (See Comments) and Nausea And Vomiting     GI       Medications     Current Outpatient Medications   Medication Sig Dispense Refill    alendronate (FOSAMAX) 70 MG tablet TAKE 1 TABLET BY MOUTH EVERY 7 DAYS 12 tablet 0    traZODone (DESYREL) 50 MG tablet TAKE 1 TABLET BY MOUTH EVERY NIGHT AS NEEDED FOR SLEEP 30 tablet 2    Zinc 30 MG TABS Take by mouth      MAGNESIUM PO Take

## 2023-08-08 NOTE — PROGRESS NOTES
Alric Drilling   1955, 76 y.o. female   [id number]       Referring Provider: Migdalia Wilhelm DO  Referral Type: In an order in [address] [place] [address]    Reason for Visit: Evaluation of the cause of disorders of hearing, tinnitus, or balance. ADULT AUDIOLOGIC EVALUATION      Cristiane Ortega is a 76 y.o. female seen today, 8/8/2023 , for an initial audiologic evaluation. Patient was seen by Migdalia Wilhelm DO following today's evaluation. Patient was alone. AUDIOLOGIC AND OTHER PERTINENT MEDICAL HISTORY:      Alric Drilling noted tinnitus and imbalance. Patient reports right ear tinnitus for the past 4-5 years. Patient has experienced an imbalance and lightheaded sensation with unknown triggers. 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 Alric Drilling denied otalgia, aural fullness, history of occupational/recreational noise exposure, history of head trauma, history of ear surgery, and family history of hearing loss. Date: 8/8/2023     IMPRESSIONS:      Abnormal middle ear pressure with normal compliance in the right ear and normal pressure and compliance in the left ear. Abnormal asymmetric hearing sensitivity, right worse than left, which can affect difficult listening environments. Word understanding was excellent presented at normal conversation level, bilaterally. Follow medical recommendations of Migdalia Wilhelm DO.     ASSESSMENT AND FINDINGS:     Otoscopy revealed: Clear ear canals bilaterally    RIGHT EAR:  Hearing Sensitivity: Normal hearing sensitivity to a moderate sensorineural hearing loss from 250-8000 Hz  Speech Recognition Threshold: 15 dB HL  Word Recognition:Excellent (96%), based on NU-6 25-word list at 55 dBHL using recorded speech stimuli. Tympanometry: Negative peak pressure with normal compliance, Type C tympanogram, consistent with ETD/history of otitis media.       LEFT EAR:  Hearing Sensitivity: Normal hearing sensitivity to a mild

## 2023-08-08 NOTE — TELEPHONE ENCOUNTER
Patient  aware that the Fosamax does not cause numbness per DR Parris Landry,    Patient will talk with Dr Parris Landry at her appointment related to changing to [facility]

## 2023-08-21 ENCOUNTER — OFFICE VISIT (OUTPATIENT)
Dept: INTERNAL MEDICINE CLINIC | Age: 68
End: 2023-08-21

## 2023-08-21 VITALS
OXYGEN SATURATION: 98 % | HEIGHT: 64 IN | BODY MASS INDEX: 18.44 KG/M2 | SYSTOLIC BLOOD PRESSURE: 126 MMHG | DIASTOLIC BLOOD PRESSURE: 82 MMHG | WEIGHT: 108 LBS | HEART RATE: 75 BPM

## 2023-08-21 DIAGNOSIS — G89.4 CHRONIC PAIN DISORDER: ICD-10-CM

## 2023-08-21 DIAGNOSIS — F32.A ANXIETY AND DEPRESSION: ICD-10-CM

## 2023-08-21 DIAGNOSIS — M81.0 OSTEOPOROSIS, POSTMENOPAUSAL: ICD-10-CM

## 2023-08-21 DIAGNOSIS — F51.01 PRIMARY INSOMNIA: ICD-10-CM

## 2023-08-21 DIAGNOSIS — K21.9 GASTROESOPHAGEAL REFLUX DISEASE WITHOUT ESOPHAGITIS: ICD-10-CM

## 2023-08-21 DIAGNOSIS — Z00.00 MEDICARE ANNUAL WELLNESS VISIT, SUBSEQUENT: Primary | ICD-10-CM

## 2023-08-21 DIAGNOSIS — F41.9 ANXIETY AND DEPRESSION: ICD-10-CM

## 2023-08-21 DIAGNOSIS — G43.009 MIGRAINE WITHOUT AURA AND WITHOUT STATUS MIGRAINOSUS, NOT INTRACTABLE: ICD-10-CM

## 2023-08-21 RX ORDER — TRAZODONE HYDROCHLORIDE 50 MG/1
50 TABLET ORAL NIGHTLY PRN
Qty: 90 TABLET | Refills: 1 | Status: SHIPPED | OUTPATIENT
Start: 2023-08-21

## 2023-08-21 RX ORDER — RIZATRIPTAN BENZOATE 10 MG/1
10 TABLET ORAL DAILY PRN
Qty: 9 TABLET | Refills: 0 | Status: SHIPPED | OUTPATIENT
Start: 2023-08-21

## 2023-08-21 RX ORDER — DULOXETIN HYDROCHLORIDE 30 MG/1
30 CAPSULE, DELAYED RELEASE ORAL DAILY
Qty: 30 CAPSULE | Refills: 0 | Status: SHIPPED | OUTPATIENT
Start: 2023-08-21

## 2023-08-21 RX ORDER — MULTIVIT-MIN/IRON FUM/FOLIC AC 7.5 MG-4
1 TABLET ORAL DAILY
Qty: 1 TABLET | Refills: 0 | COMMUNITY
Start: 2023-08-21

## 2023-08-21 RX ORDER — OMEPRAZOLE 20 MG/1
20 CAPSULE, DELAYED RELEASE ORAL
Qty: 30 CAPSULE | Refills: 0 | Status: SHIPPED | OUTPATIENT
Start: 2023-08-21

## 2023-08-21 ASSESSMENT — PATIENT HEALTH QUESTIONNAIRE - PHQ9
10. IF YOU CHECKED OFF ANY PROBLEMS, HOW DIFFICULT HAVE THESE PROBLEMS MADE IT FOR YOU TO DO YOUR WORK, TAKE CARE OF THINGS AT HOME, OR GET ALONG WITH OTHER PEOPLE: 1
3. TROUBLE FALLING OR STAYING ASLEEP: 1
7. TROUBLE CONCENTRATING ON THINGS, SUCH AS READING THE NEWSPAPER OR WATCHING TELEVISION: 0
SUM OF ALL RESPONSES TO PHQ QUESTIONS 1-9: 3
6. FEELING BAD ABOUT YOURSELF - OR THAT YOU ARE A FAILURE OR HAVE LET YOURSELF OR YOUR FAMILY DOWN: 0
9. THOUGHTS THAT YOU WOULD BE BETTER OFF DEAD, OR OF HURTING YOURSELF: 0
SUM OF ALL RESPONSES TO PHQ QUESTIONS 1-9: 3
5. POOR APPETITE OR OVEREATING: 0
8. MOVING OR SPEAKING SO SLOWLY THAT OTHER PEOPLE COULD HAVE NOTICED. OR THE OPPOSITE, BEING SO FIGETY OR RESTLESS THAT YOU HAVE BEEN MOVING AROUND A LOT MORE THAN USUAL: 0
SUM OF ALL RESPONSES TO PHQ9 QUESTIONS 1 & 2: 2
4. FEELING TIRED OR HAVING LITTLE ENERGY: 0
2. FEELING DOWN, DEPRESSED OR HOPELESS: 1
1. LITTLE INTEREST OR PLEASURE IN DOING THINGS: 1

## 2023-08-21 NOTE — PROGRESS NOTES
Lowell Dee  YOB: 1955    Date of Service:  8/21/2023    Chief Complaint:      Chief Complaint   Patient presents with    New Patient    Medicare AW       Assessment/Plan:  Jacinto Casillas was seen today for new patient and medicare awv. Diagnoses and all orders for this visit:    Medicare annual wellness visit, subsequent    Anxiety and depression  Start DULoxetine (CYMBALTA) 30 MG extended release capsule; Take 1 capsule by mouth daily    Chronic pain disorder  Start DULoxetine (CYMBALTA) 30 MG extended release capsule; Take 1 capsule by mouth daily    Primary insomnia  -     traZODone (DESYREL) 50 MG tablet; Take 1 tablet by mouth nightly as needed for Sleep    Gastroesophageal reflux disease without esophagitis  Start  omeprazole (PRILOSEC) 20 MG delayed release capsule; Take 1 capsule by mouth every morning (before breakfast)    Migraine without aura and without status migrainosus, not intractable  Start  rizatriptan (MAXALT) 10 MG tablet; Take 1 tablet by mouth daily as needed for Migraine May repeat in 2 hours if needed    Osteoporosis, postmenopausal  Stable and continue on current medications. Return VV Anxiety, chronic pain, HA 9/19. HPI:  Lowell Dee is a 76 y.o. She complains of pressure in her forehead with light sensitivity 1 a week and last about 12 hrs with motrin. She used to get auras but not anymore. She also complains of left groin pain with radiation to left leg daily and resolve with sitting. She was on neurontin and didn't want to be back on it. She did see an orthopedic and had MRI. She complains of anxiety/depression since her divorcse years ago. She was on cymbalta which helped in the past and would like to get back on it. She also get intermittent myalgia/pain throughout her body. She did see a rheumatologist in the past with negative work up.     Insomnia:  stable on trazodone 50 mg 1/2-1 qhs  Osteoporosis with h/o fracture:  stable on
(Including outside providers/suppliers regularly involved in providing care):   Patient Care Team:  Yg Frederick MD as PCP - General (Internal Medicine)  Erica Rothman MD as PCP - Empaneled Provider  Carlos Bradford MD as Surgeon (Neurosurgery)     Reviewed and updated this visit:  Tobacco  Allergies  Meds  Problems  Med Hx  Surg Hx  Soc Hx  Fam Hx

## 2023-08-21 NOTE — PATIENT INSTRUCTIONS
[place] on Aging online. You need 1535-5121 mg of calcium and 7088-6507 IU of vitamin D per day. It is possible to meet your calcium requirement with diet alone, but a vitamin D supplement is usually necessary to meet this goal.  When exposed to the sun, use a sunscreen that protects against both UVA and UVB radiation with an SPF of 30 or greater. Reapply every 2 to 3 hours or after sweating, drying off with a towel, or swimming. Always wear a seat belt when traveling in a car. Always wear a helmet when riding a bicycle or motorcycle.

## 2023-08-28 ENCOUNTER — OFFICE VISIT (OUTPATIENT)
Dept: PULMONOLOGY | Age: 68
End: 2023-08-28
Payer: MEDICARE

## 2023-08-28 ENCOUNTER — HOSPITAL ENCOUNTER (OUTPATIENT)
Dept: CT IMAGING | Age: 68
Discharge: HOME | End: 2023-08-28
Payer: MEDICARE

## 2023-08-28 VITALS
HEIGHT: 64 IN | SYSTOLIC BLOOD PRESSURE: 127 MMHG | HEART RATE: 78 BPM | TEMPERATURE: 97.6 F | BODY MASS INDEX: 18.44 KG/M2 | DIASTOLIC BLOOD PRESSURE: 87 MMHG | WEIGHT: 108 LBS | RESPIRATION RATE: 16 BRPM | OXYGEN SATURATION: 98 %

## 2023-08-28 DIAGNOSIS — R91.1 LUNG NODULE: ICD-10-CM

## 2023-08-28 DIAGNOSIS — R91.1 LUNG NODULE: Primary | ICD-10-CM

## 2023-08-28 DIAGNOSIS — Z87.891 FORMER SMOKER: ICD-10-CM

## 2023-08-28 PROCEDURE — 3017F COLORECTAL CA SCREEN DOC REV: CPT | Performed by: STUDENT IN AN ORGANIZED HEALTH CARE EDUCATION/TRAINING PROGRAM

## 2023-08-28 PROCEDURE — 71250 CT THORAX DX C-: CPT

## 2023-08-28 PROCEDURE — G8420 CALC BMI NORM PARAMETERS: HCPCS | Performed by: STUDENT IN AN ORGANIZED HEALTH CARE EDUCATION/TRAINING PROGRAM

## 2023-08-28 PROCEDURE — 1123F ACP DISCUSS/DSCN MKR DOCD: CPT | Performed by: STUDENT IN AN ORGANIZED HEALTH CARE EDUCATION/TRAINING PROGRAM

## 2023-08-28 PROCEDURE — 1036F TOBACCO NON-USER: CPT | Performed by: STUDENT IN AN ORGANIZED HEALTH CARE EDUCATION/TRAINING PROGRAM

## 2023-08-28 PROCEDURE — G8399 PT W/DXA RESULTS DOCUMENT: HCPCS | Performed by: STUDENT IN AN ORGANIZED HEALTH CARE EDUCATION/TRAINING PROGRAM

## 2023-08-28 PROCEDURE — 1090F PRES/ABSN URINE INCON ASSESS: CPT | Performed by: STUDENT IN AN ORGANIZED HEALTH CARE EDUCATION/TRAINING PROGRAM

## 2023-08-28 PROCEDURE — G8427 DOCREV CUR MEDS BY ELIG CLIN: HCPCS | Performed by: STUDENT IN AN ORGANIZED HEALTH CARE EDUCATION/TRAINING PROGRAM

## 2023-08-28 PROCEDURE — 99213 OFFICE O/P EST LOW 20 MIN: CPT | Performed by: STUDENT IN AN ORGANIZED HEALTH CARE EDUCATION/TRAINING PROGRAM

## 2023-08-28 ASSESSMENT — ENCOUNTER SYMPTOMS
COLOR CHANGE: 0
BACK PAIN: 0
EYE ITCHING: 0
EYE DISCHARGE: 0
VOMITING: 0
STRIDOR: 0
CONSTIPATION: 0
WHEEZING: 0
ABDOMINAL DISTENTION: 0
EYE PAIN: 0
COUGH: 0
NAUSEA: 0
SORE THROAT: 0
DIARRHEA: 0
TROUBLE SWALLOWING: 0
ABDOMINAL PAIN: 0
EYE REDNESS: 0
SHORTNESS OF BREATH: 0

## 2023-08-28 NOTE — PATIENT INSTRUCTIONS
Remember to bring a list of pulmonary medications and any CPAP or BiPAP machines to your next appointment with the office. Please keep all of your future appointments scheduled by [facility] office. Out of respect for other patients and providers, you may be asked to reschedule your appointment if you arrive later than your scheduled appointment time. Appointments cancelled less than 24hrs in advance will be considered a no show. Patients with three missed appointments within 1 year or four missed appointments within 2 years can be dismissed from the practice. Please be aware that our physicians are required to work in the Intensive Care Unit at [facility].  Your appointment may need to be rescheduled if they are designated to work during your appointment time. You may receive a survey regarding the care you received during your visit. Your input is valuable to us. We encourage you to complete and return your survey. We hope you will choose us in the future for your healthcare needs. Pt instructed of all future appointment dates & times, including radiology, labs, procedures & referrals. If procedures were scheduled preparation instructions provided. Instructions on future appointments with [facility] were given.

## 2023-08-28 NOTE — PROGRESS NOTES
[facility] Pulmonary Follow-up  [facility], [facility],[address]  [address] (: 1955 ) is a 76 y.o. female here for an evaluation of   Chief Complaint   Patient presents with    Follow-up     6 month follow up CT         SUBJECTIVE/OBJECTIVE:  Patient is a 80-year-old female with significant past medical history of osteoporosis that presents to [facility] pulmonary clinic for follow-up visit. Patient has no respiratory complaints today. She is still having issues with throat irritation. She denies any fever, chills, cough, nausea, vomiting, abdominal pain. She is here for follow-up of CT scan results. Per Dr. Beverly Duke:  Ramón Washington is a 76y.o. year old female here for follow-up for lung nodule. Her PCP is Dr. Omi Espinal. Duane Amaro presents for follow-up after completing PET-CT. She is accompanied by her . The patient says she still has a tickle in her throat and occasional cough. She denies wheezing, shortness of breath. She eats well, but is unable to gain weight. She is scared to cough hard due to prior vertebral fractures. She has started on treatment for osteoporosis, is due to see Dr. Gracia Mosley. She does not want to remain on the alendronate preferably. She asked me whether she can restart meloxicam.  The patient has a sensation of the soles of her feet being swollen, feels weird when she is walking. She denies tingling and numbness, suspects that the sensation in her soles is decreased. She denies GI or  complaints. The rest of her ROS was negative. Review of Systems   Constitutional:  Negative for activity change, appetite change, chills, diaphoresis and fatigue. HENT:  Negative for congestion, sore throat and trouble swallowing. Eyes:  Negative for pain, discharge, redness and itching. Respiratory:  Negative for cough, shortness of breath, wheezing and stridor.     Cardiovascular:  Negative for chest pain,

## 2023-08-28 NOTE — PROGRESS NOTES
MA Communication:   The following orders are received by verbal communication from   Melinda Pastor MD    Orders include:  FU PRN

## 2023-09-05 ENCOUNTER — OFFICE VISIT (OUTPATIENT)
Dept: ENDOCRINOLOGY | Age: 68
End: 2023-09-05

## 2023-09-05 VITALS — SYSTOLIC BLOOD PRESSURE: 135 MMHG | BODY MASS INDEX: 18.88 KG/M2 | DIASTOLIC BLOOD PRESSURE: 65 MMHG | WEIGHT: 110 LBS

## 2023-09-05 DIAGNOSIS — M80.08XD VERTEBRAL FRACTURE, OSTEOPOROTIC, WITH ROUTINE HEALING, SUBSEQUENT ENCOUNTER: ICD-10-CM

## 2023-09-05 DIAGNOSIS — Z51.81 MEDICATION MONITORING ENCOUNTER: ICD-10-CM

## 2023-09-05 DIAGNOSIS — M81.0 OSTEOPOROSIS, POSTMENOPAUSAL: Primary | ICD-10-CM

## 2023-09-05 RX ORDER — ROMOSOZUMAB-AQQG 105 MG/1.17ML
210 INJECTION, SOLUTION SUBCUTANEOUS ONCE
Qty: 1 EACH | Refills: 11 | Status: SHIPPED | OUTPATIENT
Start: 2023-09-05 | End: 2023-09-05

## 2023-09-06 NOTE — TELEPHONE ENCOUNTER
Patient calling checking on status of message
Patient informed
Patient saw Dr Huong Valentino 11/29/22 is wanting nystatin liquid called into her pharmacy, that has helped in the past with her symptoms. 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    [facility] in [place]
Routing to Dr. Huong Valentino. .. please advise. Elsa Mathis
hard copy, drawn during this pregnancy

## 2023-09-07 ENCOUNTER — TELEPHONE (OUTPATIENT)
Dept: ENDOCRINOLOGY | Age: 68
End: 2023-09-07

## 2023-09-07 NOTE — TELEPHONE ENCOUNTER
Pt has questions about her eventaty and some others for injection would like for Lee Ann to call her

## 2023-09-07 NOTE — TELEPHONE ENCOUNTER
Patient called her prescription plan for the Evenity. Nurse explained to the patient that the Evenity was sent through medical side of insurance not pharmacy.

## 2023-09-26 ENCOUNTER — TELEMEDICINE (OUTPATIENT)
Dept: INTERNAL MEDICINE CLINIC | Age: 68
End: 2023-09-26

## 2023-09-26 DIAGNOSIS — G43.009 MIGRAINE WITHOUT AURA AND WITHOUT STATUS MIGRAINOSUS, NOT INTRACTABLE: ICD-10-CM

## 2023-09-26 DIAGNOSIS — K21.9 GASTROESOPHAGEAL REFLUX DISEASE WITHOUT ESOPHAGITIS: ICD-10-CM

## 2023-09-26 DIAGNOSIS — F32.A ANXIETY AND DEPRESSION: Primary | ICD-10-CM

## 2023-09-26 DIAGNOSIS — M81.0 OSTEOPOROSIS, POSTMENOPAUSAL: ICD-10-CM

## 2023-09-26 DIAGNOSIS — F41.9 ANXIETY AND DEPRESSION: Primary | ICD-10-CM

## 2023-09-26 NOTE — PROGRESS NOTES
Patient was evaluated through a synchronous (real-time) video encounter. Patient identification was verified at the start of the visit. She (or guardian if applicable) is aware that this is a billable service, which includes applicable co-pays. This visit was conducted with the patient's (and/or legal guardian's) verbal consent. She has not had a related appointment within my department in the past 7 days or scheduled within the next 24 hours. The patient was located at Home: [address] [facility]  [address] [address]. The provider was located at Home ([address] [place] [address]): [place]. Date of Service:  9/26/2023    Chief Complaint:      Chief Complaint   Patient presents with    Anxiety    Chronic Pain       Assessment/Plan:    Ralston Olszewski was seen today for anxiety and chronic pain. Diagnoses and all orders for this visit:    Anxiety and depression  Start on Cymbalta 20 mg evening and once done, she will fill the 30 mg daily at the pharmacy from last month. Gastroesophageal reflux disease without esophagitis  Start on the Prilosec sent in last month    Migraine without aura and without status migrainosus, not intractable  Start Maxalt as needed    Osteoporosis, postmenopausal  Stable and continue on current medications per Endocrinologist     Return VV Anxiety/depression, HA 10/24. HPI:  Anabel Robins is a 76 y.o. Pressure in her forehead with light sensitivity with increase anxiety couple of times a week and last about 12 hrs with Tyl prn. She used to get auras but not anymore. She has not try the Maxalt yet. She complains of anxiety/depression since her divorcse years ago. She was on cymbalta which helped in the past and would like to get back on it. She also get intermittent myalgia/pain throughout her body. She did see a rheumatologist in the past with negative work up. She also complains of left groin pain with radiation to left leg daily and resolve with sitting.   She was on neurontin and

## 2023-09-27 ENCOUNTER — HOSPITAL ENCOUNTER (EMERGENCY)
Age: 68
Discharge: LEFT BEFORE DISCHARGE | End: 2023-09-27
Payer: MEDICARE

## 2023-09-27 VITALS
TEMPERATURE: 97.6 F | OXYGEN SATURATION: 100 % | SYSTOLIC BLOOD PRESSURE: 161 MMHG | DIASTOLIC BLOOD PRESSURE: 87 MMHG | HEART RATE: 99 BPM | RESPIRATION RATE: 16 BRPM

## 2023-09-27 LAB
EKG ATRIAL RATE: 99 BPM
EKG DIAGNOSIS: NORMAL
EKG P AXIS: 76 DEGREES
EKG P-R INTERVAL: 166 MS
EKG Q-T INTERVAL: 366 MS
EKG QRS DURATION: 62 MS
EKG QTC CALCULATION (BAZETT): 469 MS
EKG R AXIS: 40 DEGREES
EKG T AXIS: 54 DEGREES
EKG VENTRICULAR RATE: 99 BPM

## 2023-09-27 PROCEDURE — 93005 ELECTROCARDIOGRAM TRACING: CPT | Performed by: EMERGENCY MEDICINE

## 2023-09-27 PROCEDURE — 93010 ELECTROCARDIOGRAM REPORT: CPT | Performed by: INTERNAL MEDICINE

## 2023-09-27 RX ORDER — TOBRAMYCIN / DEXAMETHASONE 3; .5 MG/ML; MG/ML
SUSPENSION/ DROPS OPHTHALMIC
COMMUNITY
Start: 2022-10-14

## 2023-09-27 ASSESSMENT — PAIN - FUNCTIONAL ASSESSMENT: PAIN_FUNCTIONAL_ASSESSMENT: 0-10

## 2023-09-27 ASSESSMENT — PAIN SCALES - GENERAL: PAINLEVEL_OUTOF10: 10

## 2023-09-27 NOTE — ED NOTES
Patient has left prior to receiving a medical screening exam. Three separate attempts to locate the patient at three separate times were unsuccessful.  Multiple attempts to the patient were unsuccessful, therefor the patient was unable to be advised of the risks of leaving without a medical screening exam. Since the patient could not be located a written refusal of care was unable to be obtained       Maki Benitez RN  09/27/23 [id number]

## 2023-09-29 ENCOUNTER — PATIENT MESSAGE (OUTPATIENT)
Dept: ENDOCRINOLOGY | Age: 68
End: 2023-09-29

## 2023-10-02 NOTE — TELEPHONE ENCOUNTER
From: Filemon Wheelert  To: Dr. Pemberton Craw: 9/29/2023 8:38 AM EDT  Subject: Dr. Saint Rubin    I wanted to start Evenity but I have recently been having issues with my S. I. Joint I believe. I went to the Emergency Room 2 days ago with terrible pain. I am thinking it might be nerve pain from my back. I keep having this pain. My question is am I up to date with blood work to be able to start Evenity? I need to make sure with what is going on. Can I still start Evenity even though I'm having a lot of pain? I'm not sure what is going on. I probably need S. I. joint surgery but am unable to have it because of severe Osteoporosis. Also on September 21st I had a PRP done in my Right S.I. Joint. Please let me know, thank you.

## 2023-11-02 ENCOUNTER — NURSE ONLY (OUTPATIENT)
Dept: ENDOCRINOLOGY | Age: 68
End: 2023-11-02

## 2023-11-02 DIAGNOSIS — M81.0 OSTEOPOROSIS, POSTMENOPAUSAL: Primary | ICD-10-CM

## 2023-11-02 NOTE — PATIENT INSTRUCTIONS
Evenity supplied by physician office    Evenity 210mg adminstered subcutaneou at 105 mg in each arm. [address]   [phone]    Lot  [id number]           Exp. 1/31/26    It is the patient's responsibility to notify the physician office of new insurance plan or new identification number prior to appointment to prevent delay in treatment. Please send a copy of the front and back of the insurance card either by fax, mail or stop by the office.

## 2023-11-02 NOTE — PROGRESS NOTES
Evenity supplied by physician office    Evenity 210mg adminstered subcutaneou at 105 mg in each arm. [address]   [id number]    Lot  [id number]           Exp. 1/31/26    It is the patient's responsibility to notify the physician office of new insurance plan or new identification number prior to appointment to prevent delay in treatment. Please send a copy of the front and back of the insurance card either by fax, mail or stop by the office.

## 2023-12-04 LAB
CHOLEST SERPL-MCNC: 210 MG/DL (ref 0–199)
HDLC SERPL-MCNC: 88 MG/DL (ref 40–60)
LDLC SERPL CALC-MCNC: 106 MG/DL
TRIGL SERPL-MCNC: 78 MG/DL (ref 0–150)
VLDLC SERPL CALC-MCNC: 16 MG/DL

## 2023-12-05 LAB
EST. AVERAGE GLUCOSE BLD GHB EST-MCNC: 102.5 MG/DL
HBA1C MFR BLD: 5.2 %

## 2023-12-07 ENCOUNTER — NURSE ONLY (OUTPATIENT)
Dept: ENDOCRINOLOGY | Age: 68
End: 2023-12-07
Payer: MEDICARE

## 2023-12-07 DIAGNOSIS — M81.0 OSTEOPOROSIS, POSTMENOPAUSAL: Primary | ICD-10-CM

## 2023-12-07 PROCEDURE — 96372 THER/PROPH/DIAG INJ SC/IM: CPT | Performed by: INTERNAL MEDICINE

## 2023-12-07 NOTE — PATIENT INSTRUCTIONS
Evenity supplied by the physician office. Evenity 210mg adminstered subcutaneou at 105 mg in each arm.   [address]  [phone]      Lot  [id number]            Exp.2/28/26

## 2023-12-07 NOTE — PROGRESS NOTES
Evenity supplied by the physician office. Evenity 210mg adminstered subcutaneou at 105 mg in each arm.   [address]  [phone]      Lot  [id number]            Exp.2/28/26

## 2023-12-12 LAB — TSH SERPL DL<=0.005 MIU/L-ACNC: 1.23 UIU/ML (ref 0.27–4.2)

## 2024-01-03 ENCOUNTER — TELEPHONE (OUTPATIENT)
Dept: ENDOCRINOLOGY | Age: 69
End: 2024-01-03

## 2024-01-12 ENCOUNTER — NURSE ONLY (OUTPATIENT)
Dept: ENDOCRINOLOGY | Age: 69
End: 2024-01-12
Payer: MEDICARE

## 2024-01-12 DIAGNOSIS — M81.0 OSTEOPOROSIS, POSTMENOPAUSAL: Primary | ICD-10-CM

## 2024-01-12 PROCEDURE — 96372 THER/PROPH/DIAG INJ SC/IM: CPT | Performed by: INTERNAL MEDICINE

## 2024-01-12 NOTE — PROGRESS NOTES
After obtaining consent, and per orders of Dr. Gardner, injection of Evenity given in Left arm and Right Arm  by Ada Lundy LPN.  Informed Patient if any signs of redness, rash,swelling or unusual symptoms occur please contact office. Evenity given per physician order. Evevity supplied by physician office

## 2024-02-09 ENCOUNTER — NURSE ONLY (OUTPATIENT)
Dept: ENDOCRINOLOGY | Age: 69
End: 2024-02-09

## 2024-02-09 DIAGNOSIS — M81.0 OSTEOPOROSIS, POSTMENOPAUSAL: Primary | ICD-10-CM

## 2024-03-05 ENCOUNTER — OFFICE VISIT (OUTPATIENT)
Dept: ENDOCRINOLOGY | Age: 69
End: 2024-03-05

## 2024-03-05 VITALS
OXYGEN SATURATION: 99 % | HEIGHT: 64 IN | BODY MASS INDEX: 18.4 KG/M2 | SYSTOLIC BLOOD PRESSURE: 128 MMHG | HEART RATE: 68 BPM | DIASTOLIC BLOOD PRESSURE: 84 MMHG | WEIGHT: 107.8 LBS

## 2024-03-05 DIAGNOSIS — Z51.81 MEDICATION MONITORING ENCOUNTER: ICD-10-CM

## 2024-03-05 DIAGNOSIS — M81.0 OSTEOPOROSIS, POSTMENOPAUSAL: Primary | ICD-10-CM

## 2024-03-05 DIAGNOSIS — M80.08XD VERTEBRAL FRACTURE, OSTEOPOROTIC, WITH ROUTINE HEALING, SUBSEQUENT ENCOUNTER: ICD-10-CM

## 2024-03-05 NOTE — PROGRESS NOTES
[facility]  [address] [place] [address] [address] [place] [address]  Phone [phone]  Fax [phone]    NAME:  MARIANN BOWMAN  :  1955  CONSULT DATE:    2023  MOST RECENT VISIT:  2023  TODAY'S DATE:  2024    Labs @ [facility] 2024    PROBLEMS.  Osteoporosis by DXA 2014, T-scores -3.2 in the spine (L1+L3+L4), -2.6 in the left femoral neck    Family history of osteoporosis, unknown    2022 T10* compression fracture (coughing, RSV infection).   Natural menopause age 57  Are my bones strong enough for SI joint surgery?    CURRENT MANAGEMENT FOR BONE HEALTH/OSTEOPOROSIS.  Calcium, 300 mg from low calcium foods, 150 mg milk or 225 almond milk, 300-600 mg yogurt, 300 mg cheese   diet MVI Ca+D other    Calcium 3354-8130 746   mg/d   Vitamin D  1600  ? IU/d     25-OH D 58 ng/mL 2022 (desirable is 30-60 ng/mL)  Exercise, walks 15 min 5 days/week, PT stretching for SI joint issues  Pharmacologic therapy: Evenity 210 mcg SQ monthly started 2023    PREVIOUS BONE-ACTIVE MEDICATIONS.   Boniva ~, Actonel, , GI side effects  Fosamax 1926-1221, “had stomach issues”  Alendronate 2022-2023(changed to Evenity)    OTHER CURRENT MEDICATIONS (SELECTED): duloxetine, trazodone, clotrimazole, tramadol  OTC MEDICATIONS (SELECTED): vitamin C, zinc    CHIEF COMPLAINT.  Here for f/u of osteoporosis, vertebral fracture, monitoring treatment. No new related signs or symptoms.     INTERVAL HISTORY:  See problem list for chronic/inactive conditions.  She has been getting Evenity without side effects. No falls, near-falls or fractures.  Feels well overall.     FOR FULL DETAILS OF FAMILY HISTORY, PAST MEDICAL AND SURGICAL HISTORY, SOCIAL HISTORY, AND REVIEW OF SYSTEMS, SEE PATIENT QUESTIONNAIRE OF TODAY'S DATE.    PHYSICAL EXAMINATION.   GENERAL.  Well-nourished, well-developed, normally proportioned adult.   MENTAL STATUS. Pleasant mood.  Oriented

## 2024-03-05 NOTE — PROGRESS NOTES
Pt given Evenity, one in each arm. Pt tolerated well. Office supplied.     LOT: [id number]  EX: 05/31/2026

## 2024-04-04 ENCOUNTER — NURSE ONLY (OUTPATIENT)
Dept: ENDOCRINOLOGY | Age: 69
End: 2024-04-04
Payer: MEDICARE

## 2024-04-04 DIAGNOSIS — M81.0 OSTEOPOROSIS, POSTMENOPAUSAL: Primary | ICD-10-CM

## 2024-04-04 PROCEDURE — 96372 THER/PROPH/DIAG INJ SC/IM: CPT | Performed by: INTERNAL MEDICINE

## 2024-04-04 NOTE — PROGRESS NOTES
Evenity 210mg adminstered subcutaneou at 105 mg in each arm.  NDC 42889-445-87       Lot [id number]           Exp.6/30/2026

## 2024-04-15 ENCOUNTER — TELEPHONE (OUTPATIENT)
Dept: ENDOCRINOLOGY | Age: 69
End: 2024-04-15

## 2024-04-15 NOTE — TELEPHONE ENCOUNTER
Call from pt stating that she would like to speak with Dr Wilson to discuss her Evenity injections. Does the evenity injections help with fracture rate reduction?     Pt stated that she would also like to discuss steroid injections that her podiatrist is recommending her to start         Please advise   CB# [phone]

## 2024-04-30 ENCOUNTER — PROCEDURE VISIT (OUTPATIENT)
Dept: AUDIOLOGY | Age: 69
End: 2024-04-30
Payer: MEDICARE

## 2024-04-30 ENCOUNTER — OFFICE VISIT (OUTPATIENT)
Dept: ENT CLINIC | Age: 69
End: 2024-04-30
Payer: MEDICARE

## 2024-04-30 ENCOUNTER — TELEPHONE (OUTPATIENT)
Dept: ENT CLINIC | Age: 69
End: 2024-04-30

## 2024-04-30 VITALS
DIASTOLIC BLOOD PRESSURE: 78 MMHG | SYSTOLIC BLOOD PRESSURE: 124 MMHG | RESPIRATION RATE: 16 BRPM | HEIGHT: 64 IN | WEIGHT: 107 LBS | BODY MASS INDEX: 18.27 KG/M2

## 2024-04-30 DIAGNOSIS — J31.1 NASOPHARYNGITIS, CHRONIC: Primary | ICD-10-CM

## 2024-04-30 DIAGNOSIS — H93.13 TINNITUS OF BOTH EARS: ICD-10-CM

## 2024-04-30 DIAGNOSIS — H90.3 ASYMMETRIC SNHL (SENSORINEURAL HEARING LOSS): ICD-10-CM

## 2024-04-30 DIAGNOSIS — H93.8X3 EAR PRESSURE, BILATERAL: ICD-10-CM

## 2024-04-30 DIAGNOSIS — H90.3 SENSORINEURAL HEARING LOSS, BILATERAL: Primary | ICD-10-CM

## 2024-04-30 PROCEDURE — 1036F TOBACCO NON-USER: CPT | Performed by: OTOLARYNGOLOGY

## 2024-04-30 PROCEDURE — 92557 COMPREHENSIVE HEARING TEST: CPT | Performed by: AUDIOLOGIST

## 2024-04-30 PROCEDURE — 1123F ACP DISCUSS/DSCN MKR DOCD: CPT | Performed by: OTOLARYNGOLOGY

## 2024-04-30 PROCEDURE — G8427 DOCREV CUR MEDS BY ELIG CLIN: HCPCS | Performed by: OTOLARYNGOLOGY

## 2024-04-30 PROCEDURE — 99214 OFFICE O/P EST MOD 30 MIN: CPT | Performed by: OTOLARYNGOLOGY

## 2024-04-30 PROCEDURE — G8419 CALC BMI OUT NRM PARAM NOF/U: HCPCS | Performed by: OTOLARYNGOLOGY

## 2024-04-30 PROCEDURE — 1090F PRES/ABSN URINE INCON ASSESS: CPT | Performed by: OTOLARYNGOLOGY

## 2024-04-30 PROCEDURE — 3017F COLORECTAL CA SCREEN DOC REV: CPT | Performed by: OTOLARYNGOLOGY

## 2024-04-30 PROCEDURE — G8399 PT W/DXA RESULTS DOCUMENT: HCPCS | Performed by: OTOLARYNGOLOGY

## 2024-04-30 PROCEDURE — 92567 TYMPANOMETRY: CPT | Performed by: AUDIOLOGIST

## 2024-04-30 RX ORDER — AMOXICILLIN AND CLAVULANATE POTASSIUM 875; 125 MG/1; MG/1
1 TABLET, FILM COATED ORAL 2 TIMES DAILY
Qty: 20 TABLET | Refills: 0 | Status: SHIPPED | OUTPATIENT
Start: 2024-04-30 | End: 2024-05-01 | Stop reason: SINTOL

## 2024-04-30 ASSESSMENT — ENCOUNTER SYMPTOMS
SINUS PRESSURE: 0
TROUBLE SWALLOWING: 0
SORE THROAT: 0
SHORTNESS OF BREATH: 0
APNEA: 0
COUGH: 0
EYE ITCHING: 0
VOICE CHANGE: 0
FACIAL SWELLING: 0

## 2024-04-30 NOTE — PROGRESS NOTES
Pamela Kerr   1955, 69 y.o. female   [id number]       Referring Provider: Roseanne Negron DO  Referral Type: In an order in Epic    Reason for Visit: Evaluation of suspected change in hearing, tinnitus, or balance.    ADULT AUDIOLOGIC EVALUATION      Pamela Kerr is a 69 y.o. female seen today, 4/30/2024 , for a recheck audiologic evaluation.  Patient was seen by Roseanne Negron DO following today's evaluation. Patient was alone.    AUDIOLOGIC AND OTHER PERTINENT MEDICAL HISTORY:      Pamela Kerr noted aural fullness, tinnitus, and imbalance.  Patient reports bilateral aural fullness and intermittent bilateral tinnitus.  She experienced an imbalance sensation of unknown triggers.  Previous audiologic evaluation was completed on 08/08/2023.    Pamela Kerr denied otalgia.    Date: 4/30/2024     IMPRESSIONS:      Normal middle ear pressure and compliance, bilaterally.  Abnormal asymmetric hearing loss, right worse than left, which can coincide with patient's symptoms.  Word understanding was excellent presented at normal conversation levels, bilaterally.  Stable hearing since previous audiologic evaluation.  Follow medical recommendations of Roseanne Negron DO.     ASSESSMENT AND FINDINGS:     Otoscopy revealed: Clear ear canals bilaterally    RIGHT EAR:  Hearing Sensitivity: Normal hearing sensitivity to a moderately severe sensorineural hearing loss from 250-8000 Hz  Speech Recognition Threshold: 15 dB HL  Word Recognition:Excellent (100%), based on NU-6 25-word list at 55 dBHL with 25 dBHL of masking using recorded speech stimuli.    Tympanometry: Normal peak pressure and compliance, Type A tympanogram, consistent with normal middle ear function.      LEFT EAR:  Hearing Sensitivity: Normal hearing sensitivity to a moderate sensorineural hearing loss from 250-8000 Hz  Speech Recognition Threshold: 5 dB HL  Word Recognition: Excellent (100%), based on NU-6 25-word list at 45 dBHL using recorded

## 2024-04-30 NOTE — TELEPHONE ENCOUNTER
Hello patient called regarding her medication. Patient stated amoxicillin upsets her stomach. Patient would like a new medication. Please advise.    Callback number:[phone]

## 2024-04-30 NOTE — PROGRESS NOTES
bilaterally.The scope was passed to the nasopharynx. Examination revealed normal torus tubarius and eustachian tube opening. There was no evidence of concerning masses or lesions. The posterior nasopharyngeal wall with honey colored crusting and erythema.  The scope was removed. There were no complications with the procedure. The patient tolerated the procedure well.       Assessment and Plan     1. Nasopharyngitis, chronic  -Start nasal rinse with mupirocin  - amoxicillin-clavulanate (AUGMENTIN) 875-125 MG per tablet; Take 1 tablet by mouth 2 times daily for 10 days  Dispense: 20 tablet; Refill: 0  - mupirocin (BACTROBAN) 2 % ointment; Use 1 inch in rinse bottle twice a day  Dispense: 22 g; Refill: 0  - Zaira Dubon AuD., Audiology, [place]    2. Asymmetric SNHL (sensorineural hearing loss)  -Migraine reviewed independent interpreted left ear normal downsloping to moderate high-frequency hearing loss right ear normal downsloping to moderate severe high-frequency sensorineural hearing loss with type a tympanograms and preserved word recognition score  -Hearing loss stable  - Zaira Dubon AuD., [facility]      Return in about 3 weeks (around 5/21/2024).      Roseanne Negron DO  4/30/24      Portions of this note were dictated using Dragon. There may be linguistic errors secondary to the use of this program.

## 2024-05-01 ENCOUNTER — TELEPHONE (OUTPATIENT)
Dept: ENT CLINIC | Age: 69
End: 2024-05-01

## 2024-05-01 RX ORDER — CLINDAMYCIN HYDROCHLORIDE 300 MG/1
300 CAPSULE ORAL 3 TIMES DAILY
Qty: 30 CAPSULE | Refills: 0 | Status: SHIPPED | OUTPATIENT
Start: 2024-05-01 | End: 2024-05-11

## 2024-05-01 NOTE — TELEPHONE ENCOUNTER
Patient called regarding the medication that was prescribed, clindamycin. She is going to the beach here shortly, and wants to see if the medication is okay to take while being in the sun. Please advise    Callback [phone]

## 2024-05-02 ENCOUNTER — NURSE ONLY (OUTPATIENT)
Dept: ENDOCRINOLOGY | Age: 69
End: 2024-05-02

## 2024-05-02 DIAGNOSIS — M81.0 OSTEOPOROSIS, POSTMENOPAUSAL: Primary | ICD-10-CM

## 2024-05-02 NOTE — PROGRESS NOTES
Evenity 210mg adminstered subcutaneou at 105 mg in each arm.  NDC 0844286250      Lot [id number]           Exp. 6/30/26

## 2024-05-24 ENCOUNTER — OFFICE VISIT (OUTPATIENT)
Dept: ENT CLINIC | Age: 69
End: 2024-05-24

## 2024-05-24 VITALS
HEIGHT: 64 IN | BODY MASS INDEX: 18.27 KG/M2 | SYSTOLIC BLOOD PRESSURE: 122 MMHG | WEIGHT: 107 LBS | DIASTOLIC BLOOD PRESSURE: 80 MMHG | HEART RATE: 80 BPM | RESPIRATION RATE: 16 BRPM

## 2024-05-24 DIAGNOSIS — J31.1 NASOPHARYNGITIS, CHRONIC: Primary | ICD-10-CM

## 2024-05-24 NOTE — PROGRESS NOTES
[facility]  [address], [address]  [place] [address]  P: [phone]       Patient     Pamela Kerr  1955    ChiefComplaint     Chief Complaint   Patient presents with    Follow-up     Patient is being seen for a f/u she states she still has some mucus in throat, when she blows her nose it goes to her throat not out of her nose, didn't finish anti biotics bc it made her feel sick.        History of Present Illness     Pamela is a 69-year-old female here today for follow-up regarding nasopharyngitis.  Took 4 days of oral antibiotics and is only rinsed her nose twice.  Persistent symptoms    Past Medical History     Past Medical History:   Diagnosis Date    Allergic rhinitis, cause unspecified     Cancer (HCC)     basal cell on face    Depression 2022    Osteoporosis, unspecified     Postmenopausal     Pure hypercholesterolemia     Unspecified acute reaction to stress        Past Surgical History     Past Surgical History:   Procedure Laterality Date    EYE SURGERY Right        Family History     Family History   Problem Relation Age of Onset    Heart Disease Mother     COPD Father        Social History     Social History     Tobacco Use    Smoking status: Former     Current packs/day: 0.00     Average packs/day: 1 pack/day for 8.0 years (8.0 ttl pk-yrs)     Types: Cigarettes     Start date: 1974     Quit date: 1982     Years since quittin.4     Passive exposure: Past    Smokeless tobacco: Never   Vaping Use    Vaping Use: Never used   Substance Use Topics    Alcohol use: Yes     Alcohol/week: 0.0 standard drinks of alcohol    Drug use: No        Allergies     Allergies   Allergen Reactions    Codeine Other (See Comments) and Nausea And Vomiting     GI       Medications     Current Outpatient Medications   Medication Sig Dispense Refill    mupirocin (BACTROBAN) 2 % ointment Use 1 inch in rinse bottle twice a day 22 g 0    Tobramycin-dexAMETHasone (TOBRADEX ST)

## 2024-06-05 ENCOUNTER — NURSE ONLY (OUTPATIENT)
Dept: ENDOCRINOLOGY | Age: 69
End: 2024-06-05
Payer: MEDICARE

## 2024-06-05 DIAGNOSIS — M81.0 OSTEOPOROSIS, POSTMENOPAUSAL: Primary | ICD-10-CM

## 2024-06-05 PROCEDURE — 96372 THER/PROPH/DIAG INJ SC/IM: CPT | Performed by: INTERNAL MEDICINE

## 2024-06-26 ENCOUNTER — TELEPHONE (OUTPATIENT)
Dept: ENDOCRINOLOGY | Age: 69
End: 2024-06-26

## 2024-06-26 NOTE — TELEPHONE ENCOUNTER
Pt called asking why she hasn't had her calcium checked since starting the evenity  pt asking for a call back

## 2024-07-10 ENCOUNTER — NURSE ONLY (OUTPATIENT)
Dept: ENDOCRINOLOGY | Age: 69
End: 2024-07-10
Payer: MEDICARE

## 2024-07-10 ENCOUNTER — TELEPHONE (OUTPATIENT)
Dept: ENDOCRINOLOGY | Age: 69
End: 2024-07-10

## 2024-07-10 DIAGNOSIS — M81.0 OSTEOPOROSIS, POSTMENOPAUSAL: Primary | ICD-10-CM

## 2024-07-10 PROCEDURE — 96372 THER/PROPH/DIAG INJ SC/IM: CPT | Performed by: INTERNAL MEDICINE

## 2024-07-10 NOTE — PROGRESS NOTES
Evenity 210mg adminstered subcutaneou at 105 mg in each arm.  NDC [id number]       Lot [id number]            Exp.10/31/26

## 2024-07-10 NOTE — TELEPHONE ENCOUNTER
Reclast is fine. Best to give it about a month after that last Evenity -- that would be early November. I would like to see her and get a repeat DXA before -- or we could try to set those up on the same day in November.

## 2024-07-10 NOTE — TELEPHONE ENCOUNTER
Patient scheduled for #12 Evenity 10/4/24  Dexa 12/12/24     Patient stated \"she would like to  get IV Reclast instead of Prolia for next medication.\" Patient is asking if December is too late or should she schedule in Nov?

## 2024-07-18 DIAGNOSIS — M81.0 OSTEOPOROSIS, POSTMENOPAUSAL: Primary | ICD-10-CM

## 2024-07-18 RX ORDER — FAMOTIDINE 10 MG/ML
20 INJECTION, SOLUTION INTRAVENOUS
OUTPATIENT
Start: 2024-07-18

## 2024-07-18 RX ORDER — SODIUM CHLORIDE 9 MG/ML
INJECTION, SOLUTION INTRAVENOUS CONTINUOUS
OUTPATIENT
Start: 2024-07-18

## 2024-07-18 RX ORDER — SODIUM CHLORIDE 0.9 % (FLUSH) 0.9 %
5-40 SYRINGE (ML) INJECTION PRN
OUTPATIENT
Start: 2024-07-18

## 2024-07-18 RX ORDER — ZOLEDRONIC ACID 5 MG/100ML
5 INJECTION, SOLUTION INTRAVENOUS ONCE
OUTPATIENT
Start: 2024-07-18 | End: 2024-07-18

## 2024-07-18 RX ORDER — ACETAMINOPHEN 325 MG/1
650 TABLET ORAL
OUTPATIENT
Start: 2024-07-18

## 2024-07-18 RX ORDER — ONDANSETRON 2 MG/ML
8 INJECTION INTRAMUSCULAR; INTRAVENOUS
OUTPATIENT
Start: 2024-07-18

## 2024-07-18 RX ORDER — ALBUTEROL SULFATE 90 UG/1
4 AEROSOL, METERED RESPIRATORY (INHALATION) PRN
OUTPATIENT
Start: 2024-07-18

## 2024-07-18 RX ORDER — SODIUM CHLORIDE 9 MG/ML
5-250 INJECTION, SOLUTION INTRAVENOUS PRN
OUTPATIENT
Start: 2024-07-18

## 2024-07-18 RX ORDER — HEPARIN SODIUM (PORCINE) LOCK FLUSH IV SOLN 100 UNIT/ML 100 UNIT/ML
500 SOLUTION INTRAVENOUS PRN
OUTPATIENT
Start: 2024-07-18

## 2024-07-18 RX ORDER — EPINEPHRINE 1 MG/ML
0.3 INJECTION, SOLUTION, CONCENTRATE INTRAVENOUS PRN
OUTPATIENT
Start: 2024-07-18

## 2024-07-18 RX ORDER — DIPHENHYDRAMINE HYDROCHLORIDE 50 MG/ML
50 INJECTION INTRAMUSCULAR; INTRAVENOUS
OUTPATIENT
Start: 2024-07-18

## 2024-08-02 ENCOUNTER — TELEPHONE (OUTPATIENT)
Dept: ENDOCRINOLOGY | Age: 69
End: 2024-08-02

## 2024-08-08 ENCOUNTER — NURSE ONLY (OUTPATIENT)
Dept: ENDOCRINOLOGY | Age: 69
End: 2024-08-08
Payer: MEDICARE

## 2024-08-08 DIAGNOSIS — M81.0 OSTEOPOROSIS, POSTMENOPAUSAL: Primary | ICD-10-CM

## 2024-08-08 PROCEDURE — 96372 THER/PROPH/DIAG INJ SC/IM: CPT | Performed by: INTERNAL MEDICINE

## 2024-08-08 NOTE — PROGRESS NOTES
Evenity 210mg adminstered subcutaneou at 105 mg in each arm.  [id number]       Lot [id number]            Exp.12/31/26

## 2024-09-06 ENCOUNTER — NURSE ONLY (OUTPATIENT)
Dept: ENDOCRINOLOGY | Age: 69
End: 2024-09-06

## 2024-09-06 DIAGNOSIS — M81.0 OSTEOPOROSIS, POSTMENOPAUSAL: Primary | ICD-10-CM

## 2024-09-06 NOTE — PROGRESS NOTES
Evenity 210mg adminstered subcutaneou at 105 mg in each arm.  NDC 7562572508      Lot [id number]           Exp. 6/30/25   Warm/Dry

## 2024-09-08 RX ORDER — DENOSUMAB 60 MG/ML
60 INJECTION SUBCUTANEOUS ONCE
Qty: 1 ML | Refills: 0 | Status: SHIPPED | OUTPATIENT
Start: 2024-09-08 | End: 2024-10-08

## 2024-09-10 ENCOUNTER — TELEPHONE (OUTPATIENT)
Dept: ENDOCRINOLOGY | Age: 69
End: 2024-09-10

## 2024-10-08 ENCOUNTER — HOSPITAL ENCOUNTER (OUTPATIENT)
Dept: GENERAL RADIOLOGY | Age: 69
Discharge: HOME | End: 2024-10-08
Payer: MEDICARE

## 2024-10-08 ENCOUNTER — OFFICE VISIT (OUTPATIENT)
Dept: ENDOCRINOLOGY | Age: 69
End: 2024-10-08
Payer: MEDICARE

## 2024-10-08 VITALS — WEIGHT: 105.8 LBS | HEIGHT: 64 IN | BODY MASS INDEX: 18.06 KG/M2

## 2024-10-08 DIAGNOSIS — M80.08XD VERTEBRAL FRACTURE, OSTEOPOROTIC, WITH ROUTINE HEALING, SUBSEQUENT ENCOUNTER: ICD-10-CM

## 2024-10-08 DIAGNOSIS — M81.0 OSTEOPOROSIS, POSTMENOPAUSAL: Primary | ICD-10-CM

## 2024-10-08 DIAGNOSIS — Z51.81 MEDICATION MONITORING ENCOUNTER: ICD-10-CM

## 2024-10-08 DIAGNOSIS — M81.0 OSTEOPOROSIS, POSTMENOPAUSAL: ICD-10-CM

## 2024-10-08 PROCEDURE — 1123F ACP DISCUSS/DSCN MKR DOCD: CPT | Performed by: INTERNAL MEDICINE

## 2024-10-08 PROCEDURE — G8484 FLU IMMUNIZE NO ADMIN: HCPCS | Performed by: INTERNAL MEDICINE

## 2024-10-08 PROCEDURE — 77080 DXA BONE DENSITY AXIAL: CPT | Performed by: INTERNAL MEDICINE

## 2024-10-08 PROCEDURE — 96372 THER/PROPH/DIAG INJ SC/IM: CPT | Performed by: INTERNAL MEDICINE

## 2024-10-08 PROCEDURE — G8399 PT W/DXA RESULTS DOCUMENT: HCPCS | Performed by: INTERNAL MEDICINE

## 2024-10-08 PROCEDURE — 3017F COLORECTAL CA SCREEN DOC REV: CPT | Performed by: INTERNAL MEDICINE

## 2024-10-08 PROCEDURE — 99214 OFFICE O/P EST MOD 30 MIN: CPT | Performed by: INTERNAL MEDICINE

## 2024-10-08 PROCEDURE — 77080 DXA BONE DENSITY AXIAL: CPT

## 2024-10-08 PROCEDURE — G8419 CALC BMI OUT NRM PARAM NOF/U: HCPCS | Performed by: INTERNAL MEDICINE

## 2024-10-08 PROCEDURE — 1090F PRES/ABSN URINE INCON ASSESS: CPT | Performed by: INTERNAL MEDICINE

## 2024-10-08 PROCEDURE — G8427 DOCREV CUR MEDS BY ELIG CLIN: HCPCS | Performed by: INTERNAL MEDICINE

## 2024-10-08 PROCEDURE — 1036F TOBACCO NON-USER: CPT | Performed by: INTERNAL MEDICINE

## 2024-10-08 RX ORDER — DULOXETIN HYDROCHLORIDE 20 MG/1
20 CAPSULE, DELAYED RELEASE ORAL DAILY
COMMUNITY
Start: 2024-10-02

## 2024-10-08 RX ORDER — ROMOSOZUMAB-AQQG 105 MG/1.17ML
210 INJECTION, SOLUTION SUBCUTANEOUS ONCE
COMMUNITY

## 2024-10-08 RX ORDER — GABAPENTIN 100 MG/1
200 CAPSULE ORAL
COMMUNITY
Start: 2024-08-28

## 2024-10-08 NOTE — PROGRESS NOTES
Patient presents for a Evenity injection. Pt denies any prior adverse reactions. Evenity 105 mg given SubQ in the right and left arm with no complications. Patient tolerated well. Patient has finished course of Evenity and will be switching to Reclast infusions per Dr. Gardner.     
STATUS. Pleasant mood.  Oriented to time, place, and person.  ORAL. Teeth appear to be in good condition.  MUSCULOSKELETAL.  Spinal contours are normal.  No spine tenderness to palpation or percussion.   Three finger spaces between ribs and pelvis.  Gait steady without assistance.   NEUROLOGICAL. Able to rise from chair without using arms. No obvious motor or sensory deficit. Coordination appears normal     BONE DENSITY.  Most recent done here using Hologic equipment.      T-scores   Initial study: 12/29/2014 L1-L3-L4 -3.2 left fem. neck -2.6   Current study: 10/08/2024 L1-L3-L4 -3.5 left fem. neck -2.8     The table below shows bone mineral density (grams/cm2), the appropriate measure for comparing serial scans. A significant “increase” or “decrease” is based on precision studies done at our center according to the ISCD protocol with a least significant change of 0.030 g/cm2.     PA spine Proximal Femur (left)   Date L1-L3-L4 Fem. neck Trochanter Total hip   12/29/2014 0.703 0.559 0.480 0.659   06/01/2017 0.735 0.626 0.528 Too low   11/21/2022 0.624 0.518 --- 0.619   10/08/2024 0.665 0.539 0.464 0.612     IMPRESSION:  BONE DENSITY IS LOW, CONSISTENT WITH OSTEOPOROSIS.  SINCE THE LAST DXA, BMD INCREASED IN THE SPINE AND IS TRENDING UP IN THE FEMORAL NECK.      Labs: 09/2022 Ca 10.2 Cr 0.6. 01/2024 Ca 9.4 Cr 0.5. 08/2024 Ca 9.1 Cr 0.5.   Imaging: DXA printouts reviewed.    ASSESSMENT.  Osteoporosis, bone density lower than desirable and decreased 8587-2526. Without effective therapy, risk of fracture is high. She is doing well with Evenity started 11/2023    PLANS. OK to give Evenity today (last of 12 doses). We had a long discussion about Reclast or Prolia to follow. “I belong to 2 support groups and they are all negative about Prolia” but nothing specific other than “you can't stop Prolia” which of course is not correct - Prolia can be stopped but the benefits are lost if it is.    She will let me know her choice

## 2024-10-28 DIAGNOSIS — M81.0 OSTEOPOROSIS, POSTMENOPAUSAL: ICD-10-CM

## 2024-10-28 LAB
ALBUMIN SERPL-MCNC: 4.3 G/DL (ref 3.4–5)
ANION GAP SERPL CALCULATED.3IONS-SCNC: 12 MMOL/L (ref 3–16)
BUN SERPL-MCNC: 16 MG/DL (ref 7–20)
CALCIUM SERPL-MCNC: 9.5 MG/DL (ref 8.3–10.6)
CHLORIDE SERPL-SCNC: 103 MMOL/L (ref 99–110)
CO2 SERPL-SCNC: 25 MMOL/L (ref 21–32)
CREAT SERPL-MCNC: 0.6 MG/DL (ref 0.6–1.2)
GFR SERPLBLD CREATININE-BSD FMLA CKD-EPI: >90 ML/MIN/{1.73_M2}
GLUCOSE SERPL-MCNC: 59 MG/DL (ref 70–99)
PHOSPHATE SERPL-MCNC: 3.1 MG/DL (ref 2.5–4.9)
POTASSIUM SERPL-SCNC: 3.8 MMOL/L (ref 3.5–5.1)
SODIUM SERPL-SCNC: 140 MMOL/L (ref 136–145)

## 2024-11-04 ENCOUNTER — HOSPITAL ENCOUNTER (OUTPATIENT)
Dept: ONCOLOGY | Age: 69
Setting detail: INFUSION SERIES
Discharge: HOME | End: 2024-11-04
Payer: MEDICARE

## 2024-11-04 VITALS
OXYGEN SATURATION: 99 % | TEMPERATURE: 97.6 F | HEART RATE: 72 BPM | RESPIRATION RATE: 22 BRPM | DIASTOLIC BLOOD PRESSURE: 90 MMHG | SYSTOLIC BLOOD PRESSURE: 170 MMHG

## 2024-11-04 DIAGNOSIS — M81.0 OSTEOPOROSIS, POSTMENOPAUSAL: Primary | ICD-10-CM

## 2024-11-04 PROCEDURE — 96365 THER/PROPH/DIAG IV INF INIT: CPT

## 2024-11-04 PROCEDURE — 6360000002 HC RX W HCPCS: Performed by: INTERNAL MEDICINE

## 2024-11-04 RX ORDER — ONDANSETRON 2 MG/ML
8 INJECTION INTRAMUSCULAR; INTRAVENOUS
OUTPATIENT
Start: 2025-11-03

## 2024-11-04 RX ORDER — ACETAMINOPHEN 325 MG/1
650 TABLET ORAL
OUTPATIENT
Start: 2025-11-03

## 2024-11-04 RX ORDER — ZOLEDRONIC ACID 0.05 MG/ML
5 INJECTION, SOLUTION INTRAVENOUS ONCE
OUTPATIENT
Start: 2025-11-03 | End: 2025-11-03

## 2024-11-04 RX ORDER — HEPARIN 100 UNIT/ML
500 SYRINGE INTRAVENOUS PRN
OUTPATIENT
Start: 2025-11-03

## 2024-11-04 RX ORDER — DIPHENHYDRAMINE HYDROCHLORIDE 50 MG/ML
50 INJECTION INTRAMUSCULAR; INTRAVENOUS
OUTPATIENT
Start: 2025-11-03

## 2024-11-04 RX ORDER — SODIUM CHLORIDE 9 MG/ML
5-250 INJECTION, SOLUTION INTRAVENOUS PRN
OUTPATIENT
Start: 2025-11-03

## 2024-11-04 RX ORDER — ZOLEDRONIC ACID 0.05 MG/ML
5 INJECTION, SOLUTION INTRAVENOUS ONCE
Status: COMPLETED | OUTPATIENT
Start: 2024-11-04 | End: 2024-11-04

## 2024-11-04 RX ORDER — EPINEPHRINE 1 MG/ML
0.3 INJECTION, SOLUTION INTRAMUSCULAR; SUBCUTANEOUS PRN
OUTPATIENT
Start: 2025-11-03

## 2024-11-04 RX ORDER — SODIUM CHLORIDE 0.9 % (FLUSH) 0.9 %
5-40 SYRINGE (ML) INJECTION PRN
OUTPATIENT
Start: 2025-11-03

## 2024-11-04 RX ORDER — SODIUM CHLORIDE 9 MG/ML
INJECTION, SOLUTION INTRAVENOUS CONTINUOUS
OUTPATIENT
Start: 2025-11-03

## 2024-11-04 RX ORDER — ALBUTEROL SULFATE 90 UG/1
4 INHALANT RESPIRATORY (INHALATION) PRN
OUTPATIENT
Start: 2025-11-03

## 2024-11-04 RX ADMIN — ZOLEDRONIC ACID 5 MG: 5 INJECTION INTRAVENOUS at 11:11

## 2024-11-04 NOTE — PROGRESS NOTES
Pt seen and assessed at [facility] today for reclast infusion per orders from Dr. Gardner.  Infused per [facility] policy.  Monitoring completed for infusion reactions - see flowsheet.  Pt tolerated infusion well and without incident.  Pt verbalizes understanding of discharge instructions.  Discharged ambulatory to home with .

## 2025-01-05 ENCOUNTER — APPOINTMENT (OUTPATIENT)
Dept: GENERAL RADIOLOGY | Age: 70
End: 2025-01-05
Payer: MEDICARE

## 2025-01-05 ENCOUNTER — HOSPITAL ENCOUNTER (EMERGENCY)
Age: 70
Discharge: HOME | End: 2025-01-05
Attending: EMERGENCY MEDICINE
Payer: MEDICARE

## 2025-01-05 ENCOUNTER — APPOINTMENT (OUTPATIENT)
Dept: CT IMAGING | Age: 70
End: 2025-01-05
Payer: MEDICARE

## 2025-01-05 VITALS
HEIGHT: 63 IN | TEMPERATURE: 98.3 F | SYSTOLIC BLOOD PRESSURE: 142 MMHG | DIASTOLIC BLOOD PRESSURE: 89 MMHG | RESPIRATION RATE: 17 BRPM | BODY MASS INDEX: 19.74 KG/M2 | OXYGEN SATURATION: 100 % | WEIGHT: 111.4 LBS | HEART RATE: 80 BPM

## 2025-01-05 DIAGNOSIS — F41.9 ANXIETY: ICD-10-CM

## 2025-01-05 DIAGNOSIS — R00.2 PALPITATIONS: ICD-10-CM

## 2025-01-05 DIAGNOSIS — J06.9 VIRAL URI: Primary | ICD-10-CM

## 2025-01-05 DIAGNOSIS — I10 ESSENTIAL HYPERTENSION: ICD-10-CM

## 2025-01-05 LAB
ALBUMIN SERPL-MCNC: 4.6 G/DL (ref 3.4–5)
ALBUMIN/GLOB SERPL: 1.7 {RATIO} (ref 1.1–2.2)
ALP SERPL-CCNC: 70 U/L (ref 40–129)
ALT SERPL-CCNC: 49 U/L (ref 10–40)
ANION GAP SERPL CALCULATED.3IONS-SCNC: 13 MMOL/L (ref 3–16)
AST SERPL-CCNC: 33 U/L (ref 15–37)
BASOPHILS # BLD: 0 K/UL (ref 0–0.2)
BASOPHILS NFR BLD: 0.5 %
BILIRUB SERPL-MCNC: 0.9 MG/DL (ref 0–1)
BILIRUB UR QL STRIP.AUTO: NEGATIVE
BUN SERPL-MCNC: 11 MG/DL (ref 7–20)
CALCIUM SERPL-MCNC: 9.7 MG/DL (ref 8.3–10.6)
CHLORIDE SERPL-SCNC: 98 MMOL/L (ref 99–110)
CLARITY UR: CLEAR
CO2 SERPL-SCNC: 24 MMOL/L (ref 21–32)
COLOR UR: YELLOW
CREAT SERPL-MCNC: 0.6 MG/DL (ref 0.6–1.2)
DEPRECATED RDW RBC AUTO: 12.4 % (ref 12.4–15.4)
EOSINOPHIL # BLD: 0.1 K/UL (ref 0–0.6)
EOSINOPHIL NFR BLD: 1 %
GFR SERPLBLD CREATININE-BSD FMLA CKD-EPI: >90 ML/MIN/{1.73_M2}
GLUCOSE SERPL-MCNC: 112 MG/DL (ref 70–99)
GLUCOSE UR STRIP.AUTO-MCNC: NEGATIVE MG/DL
HCT VFR BLD AUTO: 39.8 % (ref 36–48)
HGB BLD-MCNC: 13.9 G/DL (ref 12–16)
HGB UR QL STRIP.AUTO: NEGATIVE
KETONES UR STRIP.AUTO-MCNC: ABNORMAL MG/DL
LEUKOCYTE ESTERASE UR QL STRIP.AUTO: NEGATIVE
LIPASE SERPL-CCNC: 55 U/L (ref 13–60)
LYMPHOCYTES # BLD: 1.5 K/UL (ref 1–5.1)
LYMPHOCYTES NFR BLD: 27.9 %
MAGNESIUM SERPL-MCNC: 2.22 MG/DL (ref 1.8–2.4)
MCH RBC QN AUTO: 33.3 PG (ref 26–34)
MCHC RBC AUTO-ENTMCNC: 34.9 G/DL (ref 31–36)
MCV RBC AUTO: 95.6 FL (ref 80–100)
MONOCYTES # BLD: 0.4 K/UL (ref 0–1.3)
MONOCYTES NFR BLD: 6.6 %
NEUTROPHILS # BLD: 3.5 K/UL (ref 1.7–7.7)
NEUTROPHILS NFR BLD: 64 %
NITRITE UR QL STRIP.AUTO: NEGATIVE
NT-PROBNP SERPL-MCNC: 196 PG/ML (ref 0–124)
PH UR STRIP.AUTO: 7.5 [PH] (ref 5–8)
PLATELET # BLD AUTO: 224 K/UL (ref 135–450)
PMV BLD AUTO: 7.8 FL (ref 5–10.5)
POTASSIUM SERPL-SCNC: 3.5 MMOL/L (ref 3.5–5.1)
PROT SERPL-MCNC: 7.3 G/DL (ref 6.4–8.2)
PROT UR STRIP.AUTO-MCNC: NEGATIVE MG/DL
RBC # BLD AUTO: 4.17 M/UL (ref 4–5.2)
SODIUM SERPL-SCNC: 135 MMOL/L (ref 136–145)
SP GR UR STRIP.AUTO: <=1.005 (ref 1–1.03)
TROPONIN, HIGH SENSITIVITY: 7 NG/L (ref 0–14)
TROPONIN, HIGH SENSITIVITY: 7 NG/L (ref 0–14)
UA COMPLETE W REFLEX CULTURE PNL UR: ABNORMAL
UA DIPSTICK W REFLEX MICRO PNL UR: ABNORMAL
URN SPEC COLLECT METH UR: ABNORMAL
UROBILINOGEN UR STRIP-ACNC: 0.2 E.U./DL
WBC # BLD AUTO: 5.5 K/UL (ref 4–11)

## 2025-01-05 PROCEDURE — 71260 CT THORAX DX C+: CPT

## 2025-01-05 PROCEDURE — 71045 X-RAY EXAM CHEST 1 VIEW: CPT

## 2025-01-05 PROCEDURE — 83880 ASSAY OF NATRIURETIC PEPTIDE: CPT

## 2025-01-05 PROCEDURE — 99285 EMERGENCY DEPT VISIT HI MDM: CPT

## 2025-01-05 PROCEDURE — 80053 COMPREHEN METABOLIC PANEL: CPT

## 2025-01-05 PROCEDURE — 6370000000 HC RX 637 (ALT 250 FOR IP): Performed by: EMERGENCY MEDICINE

## 2025-01-05 PROCEDURE — 84443 ASSAY THYROID STIM HORMONE: CPT

## 2025-01-05 PROCEDURE — 81003 URINALYSIS AUTO W/O SCOPE: CPT

## 2025-01-05 PROCEDURE — 83690 ASSAY OF LIPASE: CPT

## 2025-01-05 PROCEDURE — 85025 COMPLETE CBC W/AUTO DIFF WBC: CPT

## 2025-01-05 PROCEDURE — 6360000004 HC RX CONTRAST MEDICATION: Performed by: EMERGENCY MEDICINE

## 2025-01-05 PROCEDURE — 93005 ELECTROCARDIOGRAM TRACING: CPT | Performed by: EMERGENCY MEDICINE

## 2025-01-05 PROCEDURE — 84484 ASSAY OF TROPONIN QUANT: CPT

## 2025-01-05 PROCEDURE — 83735 ASSAY OF MAGNESIUM: CPT

## 2025-01-05 RX ORDER — HYDROXYZINE HYDROCHLORIDE 25 MG/1
25 TABLET, FILM COATED ORAL EVERY 8 HOURS PRN
Qty: 9 TABLET | Refills: 0 | Status: SHIPPED | OUTPATIENT
Start: 2025-01-05 | End: 2025-01-15

## 2025-01-05 RX ORDER — HYDROXYZINE HYDROCHLORIDE 10 MG/1
10 TABLET, FILM COATED ORAL 3 TIMES DAILY PRN
Status: DISCONTINUED | OUTPATIENT
Start: 2025-01-05 | End: 2025-01-05

## 2025-01-05 RX ORDER — LORAZEPAM 1 MG/1
1 TABLET ORAL
Status: COMPLETED | OUTPATIENT
Start: 2025-01-05 | End: 2025-01-05

## 2025-01-05 RX ORDER — IOPAMIDOL 755 MG/ML
75 INJECTION, SOLUTION INTRAVASCULAR
Status: COMPLETED | OUTPATIENT
Start: 2025-01-05 | End: 2025-01-05

## 2025-01-05 RX ORDER — AMLODIPINE BESYLATE 5 MG/1
5 TABLET ORAL ONCE
Status: COMPLETED | OUTPATIENT
Start: 2025-01-05 | End: 2025-01-05

## 2025-01-05 RX ADMIN — AMLODIPINE BESYLATE 5 MG: 5 TABLET ORAL at 15:34

## 2025-01-05 RX ADMIN — IOPAMIDOL 75 ML: 755 INJECTION, SOLUTION INTRAVENOUS at 16:53

## 2025-01-05 RX ADMIN — LORAZEPAM 1 MG: 1 TABLET ORAL at 16:58

## 2025-01-05 ASSESSMENT — PAIN SCALES - GENERAL: PAINLEVEL_OUTOF10: 10

## 2025-01-05 ASSESSMENT — PAIN - FUNCTIONAL ASSESSMENT: PAIN_FUNCTIONAL_ASSESSMENT: 0-10

## 2025-01-05 NOTE — DISCHARGE INSTRUCTIONS
Discharge Instructions  You were evaluated at [facility] ED with Palpitations.     You are now ready to discharge and will be discharging to: Home    Please take your medications as prescribed. Medication changes are listed below and a full list of medications is in this discharge packet. Please keep your follow-up appointments after the hospital for ongoing care. It has been a pleasure taking care of you, we wish you the best.     MEDICATION CHANGES:  -- Hydroxyzine as needed for anxiety    FOLLOW-UP:  -- Pulmonologist Sharon Mercer MD  [address] [facility] [address] [phone]

## 2025-01-05 NOTE — ED PROVIDER NOTES
EMERGENCY DEPARTMENT PROVIDER NOTE      CHIEF COMPLAINT  Palpitations (Heart palpitation for the past month. /Pt states she has a lot of fluid around her lungs. /Pt states she also has chest pain \"sometimes, but not all the time.\" /Pt states she has been feeling fatigued recently, per patient there are some days where she feels like she is going to pass out. /Pt states her family has an extensive cardiac history. /Pt states she is also feeling a pressure in her head.)        HISTORY OF PRESENT ILLNESS  Pamela Kerr  is a 69 y.o. female with a significant PMHX of GERD, osteoporosis, insomnia, presenting with headache, fatigue, and heart palpitations that have been occurring intermittently for the past month. She also reports a sensation of fluid running down her throat and a feeling as if mucus is stuck in her throat. She states that she has undergone a barium swallow study and manometry in the recent past, and there were no abnormal findings. She states she was evaluated at an urgent care 2 days ago, and was negative for flu and COVID.     Her main concern that brought her to the ED today is fatigue. She states she was too tired to get out of bed or do anything around the house today. She also reports poor appetite and has not been eating and drinking as much as usual.     Patient saw her PCP on 12/30/2024 for fatigue, congestion, and body aches. They suspected likely viral etiology and recommended supportive care.       There are no other complaints, modifying factors or associated symptoms.     Nursing notes reviewed.       Past medical history:  has a past medical history of Allergic rhinitis, cause unspecified, Cancer (HCC), Depression (9/30/2022), Osteoporosis, unspecified, Postmenopausal, Pure hypercholesterolemia, and Unspecified acute reaction to stress.    Past surgical history:  has a past surgical history that includes Eye surgery (Right).    Home medications:   Prior to Admission medications

## 2025-01-06 LAB
EKG ATRIAL RATE: 88 BPM
EKG DIAGNOSIS: NORMAL
EKG P AXIS: 56 DEGREES
EKG P-R INTERVAL: 156 MS
EKG Q-T INTERVAL: 380 MS
EKG QRS DURATION: 70 MS
EKG QTC CALCULATION (BAZETT): 459 MS
EKG R AXIS: 28 DEGREES
EKG T AXIS: 51 DEGREES
EKG VENTRICULAR RATE: 88 BPM
TSH SERPL DL<=0.005 MIU/L-ACNC: 1.13 UIU/ML (ref 0.27–4.2)

## 2025-01-06 PROCEDURE — 93010 ELECTROCARDIOGRAM REPORT: CPT | Performed by: INTERNAL MEDICINE

## 2025-01-06 NOTE — ED PROVIDER NOTES
I independently examined and evaluated Pamela Kerr.    In brief, patient is a 69-year-old female who presents to the emergency department for evaluation of tickle in her throat, fatigue, and intermittent palpitations.  Says she has things to do but has no energy. Patient reports having these symptoms for several months.  However, reports her symptoms have been worsening and expresses her frustration that she does not have a diagnosis.  Reports having upcoming appointment with rheumatology on Wednesday to be tested for Sjogren.  She had no focal neurologic deficit.  She had no trismus.  No induration under the chin.  No difficulty controlling secretions.  Patient reports having some congestion and cough 2 weeks ago, but reports her symptoms went away.  Reports following up with urgent care and PCP and reports having 2 COVID and influenza test which were both negative.  No recent cough.  Given this, COVID and flu swabs were not obtained.  Her troponin was 7 and 7.  Urinalysis not indicative of a UTI.  Creatinine is normal.  Chest x-ray shows mildly increased interstitial markings and a fine reticular pattern.  I discussed this result with patient.  Patient was agreeable with obtaining CT scan.  CT shows no pulmonary embolus.  Given no cough, I do not think she has a atypical pneumonia. I discussed the results of the workup in detail with patient and family.  As patient's symptoms have been ongoing for many months, I recommended she follow up with Rheumatology as scheduled in 2 days to discuss her symptoms along with pulmonology. She was given referral for pulm. Patient verbalized understanding and was agreeable with plan. Discharge home with strict return precautions. She is able to ambulate without assistance discharged home with .  She remained hemodynamically stable during her stay in the emergency department.    All diagnostic, treatment, and disposition decisions were made by myself in conjunction

## 2025-01-07 NOTE — TELEPHONE ENCOUNTER
LM for pt
LM for pt
Pantoprazole is fine to take. Sent in script for 20 mg daily, 20 mins prior to a meal. Can inc to 40 mg after 2 weeks. Also, avoid spicy, tomato based, and acidic foods, and chocolate and spearmint as these can worsen symptoms. Avoid alcohol, caffeine, soft drinks. Avoid eating 3-4 hours prior to laying down, and avoid drinking 1-2 hours prior to laying down.
Patient informed and verbalized good understanding.
Patient states taking pepcid once to twice daily for about a month without much improvement, she states that her acid reflux has been discussed in the past visits. Patient is asking if there is a safe medication that can be prescribed, asks about pantoprazole? Please advise.
Yes

## 2025-01-14 NOTE — PROGRESS NOTES
Pamela Kerr is a 69 y.o. who comes in today for Shortness of Breath, Fatigue, and Nasal Congestion   She was seen in the ED on 1/5/25 for viral URI and anxiety/palpitations.  She was given hydroxyzine for this.  She has not been taking since her PCP now prescribed something else that she has not started for her anxiety.  States her symptoms of SOB worsened in the last 1-2 months.  States she feels like she has chest congestion and throat drainage.   States she has tried Claritin and mucinex in the past with no real help.    She is not using albuterol. She has significant medical history of chronic pain disorder and GERD.   She is a former smoker, quit in the 1980's.  She was last seen by Dr. Stanley in August 2023, to follow prn. She has an appointment with ENT scheduled next week also. She is here with her  today.     Shortness of Breath  This is a new problem. The current episode started more than 1 month ago. The problem has been waxing and waning. Associated symptoms include headaches (occ) and sputum production (thick, clear). Pertinent negatives include no abdominal pain, chest pain, fever, hemoptysis, leg swelling, rhinorrhea, sore throat, vomiting or wheezing. Nothing aggravates the symptoms.        Past Medical History:   Diagnosis Date    Allergic rhinitis, cause unspecified     Cancer (HCC)     basal cell on face    Depression 9/30/2022    Osteoporosis, unspecified     Postmenopausal     Pure hypercholesterolemia     Unspecified acute reaction to stress         Past Surgical History:   Procedure Laterality Date    EYE SURGERY Right         Family History   Problem Relation Age of Onset    Heart Disease Mother     COPD Father         Allergies   Allergen Reactions    Codeine Other (See Comments) and Nausea And Vomiting     GI       Current Outpatient Medications   Medication Sig Dispense Refill    zoledronic acid (RECLAST) 5 MG/100ML SOLN Infuse 100 mLs intravenously once      Multiple Vitamin

## 2025-01-15 ENCOUNTER — OFFICE VISIT (OUTPATIENT)
Dept: PULMONOLOGY | Age: 70
End: 2025-01-15
Payer: MEDICARE

## 2025-01-15 VITALS
HEIGHT: 64 IN | DIASTOLIC BLOOD PRESSURE: 84 MMHG | RESPIRATION RATE: 16 BRPM | SYSTOLIC BLOOD PRESSURE: 130 MMHG | WEIGHT: 106 LBS | TEMPERATURE: 98 F | BODY MASS INDEX: 18.1 KG/M2 | HEART RATE: 75 BPM | OXYGEN SATURATION: 99 %

## 2025-01-15 DIAGNOSIS — J30.9 ALLERGIC RHINITIS, UNSPECIFIED SEASONALITY, UNSPECIFIED TRIGGER: ICD-10-CM

## 2025-01-15 DIAGNOSIS — Z87.891 FORMER SMOKER, STOPPED SMOKING IN DISTANT PAST: ICD-10-CM

## 2025-01-15 DIAGNOSIS — R06.02 SOB (SHORTNESS OF BREATH): Primary | ICD-10-CM

## 2025-01-15 PROCEDURE — 1090F PRES/ABSN URINE INCON ASSESS: CPT | Performed by: NURSE PRACTITIONER

## 2025-01-15 PROCEDURE — G8419 CALC BMI OUT NRM PARAM NOF/U: HCPCS | Performed by: NURSE PRACTITIONER

## 2025-01-15 PROCEDURE — 99213 OFFICE O/P EST LOW 20 MIN: CPT | Performed by: NURSE PRACTITIONER

## 2025-01-15 PROCEDURE — G8399 PT W/DXA RESULTS DOCUMENT: HCPCS | Performed by: NURSE PRACTITIONER

## 2025-01-15 PROCEDURE — 1159F MED LIST DOCD IN RCRD: CPT | Performed by: NURSE PRACTITIONER

## 2025-01-15 PROCEDURE — 3017F COLORECTAL CA SCREEN DOC REV: CPT | Performed by: NURSE PRACTITIONER

## 2025-01-15 PROCEDURE — 1036F TOBACCO NON-USER: CPT | Performed by: NURSE PRACTITIONER

## 2025-01-15 PROCEDURE — G8427 DOCREV CUR MEDS BY ELIG CLIN: HCPCS | Performed by: NURSE PRACTITIONER

## 2025-01-15 PROCEDURE — 1160F RVW MEDS BY RX/DR IN RCRD: CPT | Performed by: NURSE PRACTITIONER

## 2025-01-15 PROCEDURE — 1123F ACP DISCUSS/DSCN MKR DOCD: CPT | Performed by: NURSE PRACTITIONER

## 2025-01-15 RX ORDER — ZOLEDRONIC ACID 0.05 MG/ML
5 INJECTION, SOLUTION INTRAVENOUS ONCE
COMMUNITY

## 2025-01-15 ASSESSMENT — ENCOUNTER SYMPTOMS
ABDOMINAL PAIN: 0
SPUTUM PRODUCTION: 1
NAUSEA: 0
VOMITING: 0
EYE PAIN: 0
HEMOPTYSIS: 0
DIARRHEA: 0
SHORTNESS OF BREATH: 1
CHEST TIGHTNESS: 1
COUGH: 0
SORE THROAT: 0
WHEEZING: 0
RHINORRHEA: 0

## 2025-01-15 NOTE — PROGRESS NOTES
MA Communication:  The following orders are received by verbal communication from Kathleen Dick CNP    Orders include:    3wk follow up  PFT

## 2025-01-21 ENCOUNTER — OFFICE VISIT (OUTPATIENT)
Dept: ENT CLINIC | Age: 70
End: 2025-01-21
Payer: MEDICARE

## 2025-01-21 VITALS
SYSTOLIC BLOOD PRESSURE: 142 MMHG | HEART RATE: 73 BPM | DIASTOLIC BLOOD PRESSURE: 79 MMHG | HEIGHT: 64 IN | BODY MASS INDEX: 18.1 KG/M2 | WEIGHT: 106 LBS

## 2025-01-21 DIAGNOSIS — J31.1 NASOPHARYNGITIS, CHRONIC: Primary | ICD-10-CM

## 2025-01-21 DIAGNOSIS — R09.82 POST-NASAL DRAINAGE: ICD-10-CM

## 2025-01-21 PROCEDURE — G8399 PT W/DXA RESULTS DOCUMENT: HCPCS | Performed by: OTOLARYNGOLOGY

## 2025-01-21 PROCEDURE — G8419 CALC BMI OUT NRM PARAM NOF/U: HCPCS | Performed by: OTOLARYNGOLOGY

## 2025-01-21 PROCEDURE — 92511 NASOPHARYNGOSCOPY: CPT | Performed by: OTOLARYNGOLOGY

## 2025-01-21 PROCEDURE — 1159F MED LIST DOCD IN RCRD: CPT | Performed by: OTOLARYNGOLOGY

## 2025-01-21 PROCEDURE — 1036F TOBACCO NON-USER: CPT | Performed by: OTOLARYNGOLOGY

## 2025-01-21 PROCEDURE — 3017F COLORECTAL CA SCREEN DOC REV: CPT | Performed by: OTOLARYNGOLOGY

## 2025-01-21 PROCEDURE — 1123F ACP DISCUSS/DSCN MKR DOCD: CPT | Performed by: OTOLARYNGOLOGY

## 2025-01-21 PROCEDURE — 1160F RVW MEDS BY RX/DR IN RCRD: CPT | Performed by: OTOLARYNGOLOGY

## 2025-01-21 PROCEDURE — 1090F PRES/ABSN URINE INCON ASSESS: CPT | Performed by: OTOLARYNGOLOGY

## 2025-01-21 PROCEDURE — G8427 DOCREV CUR MEDS BY ELIG CLIN: HCPCS | Performed by: OTOLARYNGOLOGY

## 2025-01-21 PROCEDURE — 99214 OFFICE O/P EST MOD 30 MIN: CPT | Performed by: OTOLARYNGOLOGY

## 2025-01-21 RX ORDER — MUPIROCIN 20 MG/G
OINTMENT TOPICAL
Qty: 22 G | Refills: 0 | Status: SHIPPED | OUTPATIENT
Start: 2025-01-21

## 2025-01-21 ASSESSMENT — ENCOUNTER SYMPTOMS
FACIAL SWELLING: 0
SINUS PRESSURE: 0
SORE THROAT: 0
VOICE CHANGE: 0
SHORTNESS OF BREATH: 0
APNEA: 0
TROUBLE SWALLOWING: 0
EYE ITCHING: 0
COUGH: 0

## 2025-01-21 NOTE — PROGRESS NOTES
by mouth 2 times daily      albuterol sulfate HFA (PROVENTIL;VENTOLIN;PROAIR) 108 (90 Base) MCG/ACT inhaler Inhale 2 puffs into the lungs every 6 hours as needed      Multiple Vitamin (MULTIVITAMIN ADULT PO) Take by mouth      Probiotic Product (PROBIOTIC DAILY PO) Take by mouth      MAGNESIUM PO Take by mouth      gabapentin (NEURONTIN) 100 MG capsule Take 2 capsules by mouth nightly.      DULoxetine (CYMBALTA) 20 MG extended release capsule Take 1 capsule by mouth daily      romosozumab-aqqg (EVENITY) 105 MG/1.17ML SOSY injection Inject 2.34 mLs into the skin once       No current facility-administered medications for this visit.       Review of Systems     Review of Systems   Constitutional:  Negative for appetite change, chills, fatigue, fever and unexpected weight change.   HENT:  Positive for postnasal drip. Negative for congestion, ear discharge, ear pain, facial swelling, hearing loss, nosebleeds, sinus pressure, sneezing, sore throat, tinnitus, trouble swallowing and voice change.    Eyes:  Negative for itching.   Respiratory:  Negative for apnea, cough and shortness of breath.    Endocrine: Negative for cold intolerance and heat intolerance.   Musculoskeletal:  Negative for myalgias and neck pain.   Skin:  Negative for rash.   Allergic/Immunologic: Negative for environmental allergies.   Neurological:  Negative for dizziness and headaches.   Psychiatric/Behavioral:  Negative for confusion, decreased concentration and sleep disturbance.          PhysicalExam     Vitals:    01/21/25 1138   BP: (!) 142/79   Pulse: 73   Weight: 48.1 kg (106 lb)   Height: 1.626 m (5' 4\")       Physical Exam  Constitutional:       General: She is not in acute distress.     Appearance: She is well-developed.   HENT:      Head: Normocephalic and atraumatic.      Right Ear: Tympanic membrane, ear canal and external ear normal. No drainage. No middle ear effusion. Tympanic membrane is not bulging. Tympanic membrane has normal

## 2025-01-25 ENCOUNTER — TELEPHONE (OUTPATIENT)
Dept: OTOLARYNGOLOGY | Age: 70
End: 2025-01-25

## 2025-01-25 ENCOUNTER — TELEPHONE (OUTPATIENT)
Dept: ENDOCRINOLOGY | Age: 70
End: 2025-01-25

## 2025-01-25 NOTE — TELEPHONE ENCOUNTER
Patient called for abx for severe headache, sore throat and generalized muscle and joint pain. Advised that this sound like the flu and should call her PCP or go to urgent care.

## 2025-01-25 NOTE — TELEPHONE ENCOUNTER
Patient called and reports Leg pain, spasm , feet swelling, muscle and joint pain for 5 to 6 weeks  Last night worse. Seen rheumatology and other physicians.No cause found and is now thinking if due to Reclast.  Advised symptoms are unlikely due to reclast If severe symptoms can get furthur evaluation at urgent care or ER.

## 2025-01-27 ENCOUNTER — TELEPHONE (OUTPATIENT)
Dept: PULMONOLOGY | Age: 70
End: 2025-01-27

## 2025-01-27 NOTE — TELEPHONE ENCOUNTER
Patient cancelled appointment on 1/27/25 with [facility] for PFT and also appt on 2/3/25 with Kathleen Dick for PFT fu.    Reason: Pt has COVID    Patient did not reschedule appointment.    Appointment rescheduled:  Pt will call to reschedule her PFT once she is better then will call and reschedule her appointment with Kathleen.

## 2025-02-11 ENCOUNTER — TELEPHONE (OUTPATIENT)
Dept: ENDOCRINOLOGY | Age: 70
End: 2025-02-11

## 2025-04-01 ENCOUNTER — TRANSCRIBE ORDERS (OUTPATIENT)
Dept: ADMINISTRATIVE | Age: 70
End: 2025-04-01

## 2025-04-01 DIAGNOSIS — M54.16 LUMBAR RADICULOPATHY, RIGHT: ICD-10-CM

## 2025-04-01 DIAGNOSIS — M54.6 PAIN IN THORACIC SPINE: Primary | ICD-10-CM

## 2025-04-28 ENCOUNTER — HOSPITAL ENCOUNTER (OUTPATIENT)
Dept: MRI IMAGING | Age: 70
Discharge: HOME | End: 2025-04-28
Payer: MEDICARE

## 2025-04-28 DIAGNOSIS — M54.6 PAIN IN THORACIC SPINE: ICD-10-CM

## 2025-04-28 DIAGNOSIS — M54.16 LUMBAR RADICULOPATHY, RIGHT: ICD-10-CM

## 2025-04-28 PROCEDURE — 72146 MRI CHEST SPINE W/O DYE: CPT

## 2025-07-15 ENCOUNTER — TELEPHONE (OUTPATIENT)
Dept: ENDOCRINOLOGY | Age: 70
End: 2025-07-15

## 2025-07-15 NOTE — TELEPHONE ENCOUNTER
She received Reclast 11/04/2024. It's usually given once a year. The sequence we follow is for me to see her (appt scheduled 10/2025) and then make arrangements for the next Reclast dose.

## 2025-07-17 ENCOUNTER — TELEPHONE (OUTPATIENT)
Dept: ENDOCRINOLOGY | Age: 70
End: 2025-07-17